# Patient Record
Sex: FEMALE | Race: WHITE | NOT HISPANIC OR LATINO | Employment: OTHER | URBAN - METROPOLITAN AREA
[De-identification: names, ages, dates, MRNs, and addresses within clinical notes are randomized per-mention and may not be internally consistent; named-entity substitution may affect disease eponyms.]

---

## 2017-06-28 ENCOUNTER — HISTORICAL (OUTPATIENT)
Dept: RADIOLOGY | Facility: HOSPITAL | Age: 82
End: 2017-06-28

## 2017-08-24 ENCOUNTER — HISTORICAL (OUTPATIENT)
Dept: ADMINISTRATIVE | Facility: HOSPITAL | Age: 82
End: 2017-08-24

## 2017-10-13 ENCOUNTER — HISTORICAL (OUTPATIENT)
Dept: SURGERY | Facility: HOSPITAL | Age: 82
End: 2017-10-13

## 2017-10-13 LAB
ABS NEUT (OLG): 4.37 X10(3)/MCL (ref 2.1–9.2)
ALBUMIN SERPL-MCNC: 3.8 GM/DL (ref 3.4–5)
ALBUMIN/GLOB SERPL: 1 {RATIO}
ALP SERPL-CCNC: 34 UNIT/L (ref 45–117)
ALT SERPL-CCNC: 24 UNIT/L (ref 13–56)
AST SERPL-CCNC: 26 UNIT/L (ref 15–37)
BASOPHILS # BLD AUTO: 0.05 X10(3)/MCL (ref 0–0.2)
BASOPHILS NFR BLD AUTO: 0.8 % (ref 0–1)
BILIRUB SERPL-MCNC: 0.5 MG/DL (ref 0.2–1)
BILIRUBIN DIRECT+TOT PNL SERPL-MCNC: 0.2 MG/DL (ref 0–0.2)
BILIRUBIN DIRECT+TOT PNL SERPL-MCNC: 0.3 MG/DL (ref 0–1)
BUN SERPL-MCNC: 46 MG/DL (ref 7–18)
CALCIUM SERPL-MCNC: 9.8 MG/DL (ref 8.5–10.1)
CHLORIDE SERPL-SCNC: 109 MMOL/L (ref 98–107)
CHOLEST SERPL-MCNC: 145 MG/DL (ref 0–200)
CHOLEST/HDLC SERPL: 2.7 {RATIO} (ref 0–4)
CO2 SERPL-SCNC: 28 MMOL/L (ref 21–32)
CREAT SERPL-MCNC: 1.53 MG/DL (ref 0.55–1.02)
EOSINOPHIL # BLD AUTO: 0.25 X10(3)/MCL (ref 0–0.9)
EOSINOPHIL NFR BLD AUTO: 4 % (ref 0–6.4)
ERYTHROCYTE [DISTWIDTH] IN BLOOD BY AUTOMATED COUNT: 14.6 % (ref 11.5–17)
EST. AVERAGE GLUCOSE BLD GHB EST-MCNC: 128 MG/DL
GLOBULIN SER-MCNC: 4 GM/DL (ref 2–4)
GLUCOSE SERPL-MCNC: 113 MG/DL (ref 74–106)
HBA1C MFR BLD: 6.1 % (ref 4.2–6.3)
HCT VFR BLD AUTO: 39.8 % (ref 37–47)
HDLC SERPL-MCNC: 53 MG/DL (ref 35–60)
HGB BLD-MCNC: 12.9 GM/DL (ref 12–16)
IMM GRANULOCYTES # BLD AUTO: 0.01 10*3/UL (ref 0–0.02)
IMM GRANULOCYTES NFR BLD AUTO: 0.2 % (ref 0–0.43)
INR PPP: 1
LDLC SERPL CALC-MCNC: 79 MG/DL (ref 0–129)
LYMPHOCYTES # BLD AUTO: 1.19 X10(3)/MCL (ref 0.6–4.6)
LYMPHOCYTES NFR BLD AUTO: 18.9 % (ref 16–44)
MAGNESIUM SERPL-MCNC: 2.5 MG/DL (ref 1.8–2.4)
MCH RBC QN AUTO: 27.7 PG (ref 27–31)
MCHC RBC AUTO-ENTMCNC: 32.4 GM/DL (ref 33–36)
MCV RBC AUTO: 85.4 FL (ref 80–94)
MONOCYTES # BLD AUTO: 0.44 X10(3)/MCL (ref 0.1–1.3)
MONOCYTES NFR BLD AUTO: 7 % (ref 4–12.1)
NEUTROPHILS # BLD AUTO: 4.37 X10(3)/MCL (ref 2.1–9.2)
NEUTROPHILS NFR BLD AUTO: 69.1 % (ref 43–73)
NRBC BLD AUTO-RTO: 0 % (ref 0–0.2)
PLATELET # BLD AUTO: 279 X10(3)/MCL (ref 130–400)
PMV BLD AUTO: 11 FL (ref 7.4–10.4)
POTASSIUM SERPL-SCNC: 4.1 MMOL/L (ref 3.5–5.1)
PROT SERPL-MCNC: 7.6 GM/DL (ref 6.4–8.2)
PROTHROMBIN TIME: 11.3 SECOND(S) (ref 9.8–12)
RBC # BLD AUTO: 4.66 X10(6)/MCL (ref 4.2–5.4)
SODIUM SERPL-SCNC: 143 MMOL/L (ref 136–145)
TRIGL SERPL-MCNC: 66 MG/DL (ref 30–150)
TSH SERPL-ACNC: 2.46 MIU/ML (ref 0.36–3.74)
VLDLC SERPL CALC-MCNC: 13 MG/DL
WBC # SPEC AUTO: 6.3 X10(3)/MCL (ref 4.5–11.5)

## 2019-01-17 ENCOUNTER — HISTORICAL (OUTPATIENT)
Dept: ADMINISTRATIVE | Facility: HOSPITAL | Age: 84
End: 2019-01-17

## 2019-01-24 ENCOUNTER — HISTORICAL (OUTPATIENT)
Dept: RADIOLOGY | Facility: HOSPITAL | Age: 84
End: 2019-01-24

## 2019-10-01 LAB
INFLUENZA A ANTIGEN, POC: NEGATIVE
INFLUENZA B ANTIGEN, POC: NEGATIVE

## 2020-01-16 ENCOUNTER — HISTORICAL (OUTPATIENT)
Dept: ADMINISTRATIVE | Facility: HOSPITAL | Age: 85
End: 2020-01-16

## 2020-01-16 LAB
BUN SERPL-MCNC: 44 MG/DL (ref 7–18)
CALCIUM SERPL-MCNC: 10.5 MG/DL (ref 8.5–10.1)
CHLORIDE SERPL-SCNC: 106 MMOL/L (ref 98–107)
CO2 SERPL-SCNC: 31 MMOL/L (ref 21–32)
CREAT SERPL-MCNC: 1.58 MG/DL (ref 0.55–1.02)
CREAT/UREA NIT SERPL: 27.8
ERYTHROCYTE [DISTWIDTH] IN BLOOD BY AUTOMATED COUNT: 13.8 % (ref 11.5–17)
GLUCOSE SERPL-MCNC: 99 MG/DL (ref 74–106)
HCT VFR BLD AUTO: 43.4 % (ref 37–47)
HGB BLD-MCNC: 13.1 GM/DL (ref 12–16)
MCH RBC QN AUTO: 26.7 PG (ref 27–31)
MCHC RBC AUTO-ENTMCNC: 30.2 GM/DL (ref 33–36)
MCV RBC AUTO: 88.6 FL (ref 80–94)
PLATELET # BLD AUTO: 277 X10(3)/MCL (ref 130–400)
PMV BLD AUTO: 11.2 FL (ref 9.4–12.4)
POTASSIUM SERPL-SCNC: 5.2 MMOL/L (ref 3.5–5.1)
RBC # BLD AUTO: 4.9 X10(6)/MCL (ref 4.2–5.4)
SODIUM SERPL-SCNC: 142 MMOL/L (ref 136–145)
URATE SERPL-MCNC: 4.1 MG/DL (ref 2.6–7.2)
WBC # SPEC AUTO: 6 X10(3)/MCL (ref 4.5–11.5)

## 2020-02-26 ENCOUNTER — HISTORICAL (OUTPATIENT)
Dept: ADMINISTRATIVE | Facility: HOSPITAL | Age: 85
End: 2020-02-26

## 2020-02-26 LAB
ALBUMIN SERPL-MCNC: 3.7 GM/DL (ref 3.4–5)
ALBUMIN/GLOB SERPL: 1 RATIO (ref 1.1–2)
ALP SERPL-CCNC: 51 UNIT/L (ref 38–126)
ALT SERPL-CCNC: 20 UNIT/L (ref 12–78)
AST SERPL-CCNC: 27 UNIT/L (ref 15–37)
BILIRUB SERPL-MCNC: 0.4 MG/DL (ref 0.2–1)
BILIRUBIN DIRECT+TOT PNL SERPL-MCNC: 0.2 MG/DL (ref 0–0.5)
BILIRUBIN DIRECT+TOT PNL SERPL-MCNC: 0.2 MG/DL (ref 0–0.8)
BUN SERPL-MCNC: 43 MG/DL (ref 7–18)
CALCIUM SERPL-MCNC: 10.2 MG/DL (ref 8.5–10.1)
CHLORIDE SERPL-SCNC: 106 MMOL/L (ref 98–107)
CHOLEST SERPL-MCNC: 153 MG/DL (ref 0–200)
CHOLEST/HDLC SERPL: 2.2 {RATIO} (ref 0–4)
CO2 SERPL-SCNC: 29 MMOL/L (ref 21–32)
CREAT SERPL-MCNC: 1.44 MG/DL (ref 0.55–1.02)
DEPRECATED CALCIDIOL+CALCIFEROL SERPL-MC: 75.46 NG/ML (ref 30–80)
ERYTHROCYTE [DISTWIDTH] IN BLOOD BY AUTOMATED COUNT: 13.8 % (ref 11.5–17)
EST. AVERAGE GLUCOSE BLD GHB EST-MCNC: 123 MG/DL
GLOBULIN SER-MCNC: 3.6 GM/DL (ref 2.4–3.5)
GLUCOSE SERPL-MCNC: 99 MG/DL (ref 74–106)
HBA1C MFR BLD: 5.9 % (ref 4.2–6.3)
HCT VFR BLD AUTO: 41.8 % (ref 37–47)
HDLC SERPL-MCNC: 68 MG/DL (ref 35–60)
HGB BLD-MCNC: 12.8 GM/DL (ref 12–16)
LDLC SERPL CALC-MCNC: 75 MG/DL (ref 0–129)
MCH RBC QN AUTO: 27.1 PG (ref 27–31)
MCHC RBC AUTO-ENTMCNC: 30.6 GM/DL (ref 33–36)
MCV RBC AUTO: 88.6 FL (ref 80–94)
PLATELET # BLD AUTO: 297 X10(3)/MCL (ref 130–400)
PMV BLD AUTO: 11.1 FL (ref 9.4–12.4)
POTASSIUM SERPL-SCNC: 5.2 MMOL/L (ref 3.5–5.1)
PROT SERPL-MCNC: 7.3 GM/DL (ref 6.4–8.2)
RBC # BLD AUTO: 4.72 X10(6)/MCL (ref 4.2–5.4)
SODIUM SERPL-SCNC: 140 MMOL/L (ref 136–145)
T3FREE SERPL-MCNC: 2.99 PG/ML (ref 2.18–3.98)
T4 FREE SERPL-MCNC: 1.17 NG/DL (ref 0.76–1.46)
TRIGL SERPL-MCNC: 52 MG/DL (ref 30–150)
TSH SERPL-ACNC: 3.17 MIU/L (ref 0.36–3.74)
VLDLC SERPL CALC-MCNC: 10 MG/DL
WBC # SPEC AUTO: 6.2 X10(3)/MCL (ref 4.5–11.5)

## 2020-06-10 ENCOUNTER — HISTORICAL (OUTPATIENT)
Dept: ADMINISTRATIVE | Facility: HOSPITAL | Age: 85
End: 2020-06-10

## 2020-06-10 LAB
ALBUMIN SERPL-MCNC: 3.9 GM/DL (ref 3.4–4.8)
ALBUMIN/GLOB SERPL: 1.2 RATIO (ref 1.1–2)
ALP SERPL-CCNC: 42 UNIT/L (ref 40–150)
ALT SERPL-CCNC: 14 UNIT/L (ref 0–55)
AST SERPL-CCNC: 28 UNIT/L (ref 5–34)
BILIRUB SERPL-MCNC: 0.5 MG/DL
BILIRUBIN DIRECT+TOT PNL SERPL-MCNC: 0.2 MG/DL (ref 0–0.8)
BILIRUBIN DIRECT+TOT PNL SERPL-MCNC: 0.3 MG/DL (ref 0–0.5)
BUN SERPL-MCNC: 40.2 MG/DL (ref 9.8–20.1)
CALCIUM SERPL-MCNC: 9.9 MG/DL (ref 8.4–10.2)
CHLORIDE SERPL-SCNC: 105 MMOL/L (ref 98–107)
CO2 SERPL-SCNC: 29 MMOL/L (ref 23–31)
CREAT SERPL-MCNC: 1.52 MG/DL (ref 0.55–1.02)
ERYTHROCYTE [DISTWIDTH] IN BLOOD BY AUTOMATED COUNT: 14.2 % (ref 11.5–17)
EST. AVERAGE GLUCOSE BLD GHB EST-MCNC: 119.8 MG/DL
FERRITIN SERPL-MCNC: 67.37 NG/ML (ref 4.63–204)
GLOBULIN SER-MCNC: 3.2 GM/DL (ref 2.4–3.5)
GLUCOSE SERPL-MCNC: 96 MG/DL (ref 82–115)
HBA1C MFR BLD: 5.8 %
HCT VFR BLD AUTO: 42.2 % (ref 37–47)
HGB BLD-MCNC: 13 GM/DL (ref 12–16)
IRON SATN MFR SERPL: 24 % (ref 20–50)
IRON SERPL-MCNC: 101 UG/DL (ref 50–170)
MCH RBC QN AUTO: 27.4 PG (ref 27–31)
MCHC RBC AUTO-ENTMCNC: 30.8 GM/DL (ref 33–36)
MCV RBC AUTO: 89 FL (ref 80–94)
PLATELET # BLD AUTO: 277 X10(3)/MCL (ref 130–400)
PMV BLD AUTO: 10.8 FL (ref 9.4–12.4)
POTASSIUM SERPL-SCNC: 5.1 MMOL/L (ref 3.5–5.1)
PROT SERPL-MCNC: 7.1 GM/DL (ref 5.8–7.6)
PTH-INTACT SERPL-MCNC: 57.9 PG/ML (ref 8.7–77.1)
RBC # BLD AUTO: 4.74 X10(6)/MCL (ref 4.2–5.4)
SODIUM SERPL-SCNC: 140 MMOL/L (ref 136–145)
TIBC SERPL-MCNC: 318 UG/DL (ref 70–310)
TIBC SERPL-MCNC: 419 UG/DL (ref 250–450)
TRANSFERRIN SERPL-MCNC: 379 MG/DL
URATE SERPL-MCNC: 4.8 MG/DL (ref 2.7–6)
WBC # SPEC AUTO: 6 X10(3)/MCL (ref 4.5–11.5)

## 2020-09-03 ENCOUNTER — HISTORICAL (OUTPATIENT)
Dept: ADMINISTRATIVE | Facility: HOSPITAL | Age: 85
End: 2020-09-03

## 2020-09-03 LAB
ALBUMIN SERPL-MCNC: 3.9 GM/DL (ref 3.4–5)
ALBUMIN/GLOB SERPL: 1.3 RATIO (ref 1.1–2)
ALP SERPL-CCNC: 41 UNIT/L (ref 40–150)
ALT SERPL-CCNC: 15 UNIT/L (ref 0–55)
AST SERPL-CCNC: 28 UNIT/L (ref 5–34)
BILIRUB SERPL-MCNC: 0.5 MG/DL
BILIRUBIN DIRECT+TOT PNL SERPL-MCNC: 0.2 MG/DL (ref 0–0.8)
BILIRUBIN DIRECT+TOT PNL SERPL-MCNC: 0.3 MG/DL (ref 0–0.5)
BUN SERPL-MCNC: 43.7 MG/DL (ref 9.8–20.1)
CALCIUM SERPL-MCNC: 9.4 MG/DL (ref 8.4–10.2)
CHLORIDE SERPL-SCNC: 104 MMOL/L (ref 98–107)
CHOLEST SERPL-MCNC: 150 MG/DL
CHOLEST/HDLC SERPL: 3 {RATIO} (ref 0–5)
CO2 SERPL-SCNC: 29 MMOL/L (ref 23–31)
CREAT SERPL-MCNC: 1.47 MG/DL (ref 0.55–1.02)
ERYTHROCYTE [DISTWIDTH] IN BLOOD BY AUTOMATED COUNT: 14.3 % (ref 11.5–17)
EST. AVERAGE GLUCOSE BLD GHB EST-MCNC: 128.4 MG/DL
GLOBULIN SER-MCNC: 3 GM/DL (ref 2.4–3.5)
GLUCOSE SERPL-MCNC: 100 MG/DL (ref 82–115)
HBA1C MFR BLD: 6.1 %
HCT VFR BLD AUTO: 43.4 % (ref 37–47)
HDLC SERPL-MCNC: 55 MG/DL (ref 35–60)
HGB BLD-MCNC: 13.2 GM/DL (ref 12–16)
LDLC SERPL CALC-MCNC: 81 MG/DL (ref 50–140)
MCH RBC QN AUTO: 26.7 PG (ref 27–31)
MCHC RBC AUTO-ENTMCNC: 30.4 GM/DL (ref 33–36)
MCV RBC AUTO: 87.9 FL (ref 80–94)
PLATELET # BLD AUTO: 267 X10(3)/MCL (ref 130–400)
PMV BLD AUTO: 11.5 FL (ref 9.4–12.4)
POTASSIUM SERPL-SCNC: 5.2 MMOL/L (ref 3.5–5.1)
PROT SERPL-MCNC: 6.9 GM/DL (ref 5.8–7.6)
RBC # BLD AUTO: 4.94 X10(6)/MCL (ref 4.2–5.4)
SODIUM SERPL-SCNC: 143 MMOL/L (ref 136–145)
TRIGL SERPL-MCNC: 69 MG/DL (ref 37–140)
VLDLC SERPL CALC-MCNC: 14 MG/DL
WBC # SPEC AUTO: 5.6 X10(3)/MCL (ref 4.5–11.5)

## 2020-12-07 ENCOUNTER — HISTORICAL (OUTPATIENT)
Dept: ADMINISTRATIVE | Facility: HOSPITAL | Age: 85
End: 2020-12-07

## 2020-12-07 LAB
ALBUMIN SERPL-MCNC: 4.1 GM/DL (ref 3.4–4.8)
ALBUMIN/GLOB SERPL: 1.2 RATIO (ref 1.1–2)
ALP SERPL-CCNC: 42 UNIT/L (ref 40–150)
ALT SERPL-CCNC: 16 UNIT/L (ref 0–55)
AST SERPL-CCNC: 30 UNIT/L (ref 5–34)
BILIRUB SERPL-MCNC: 0.6 MG/DL
BILIRUBIN DIRECT+TOT PNL SERPL-MCNC: 0.3 MG/DL (ref 0–0.5)
BILIRUBIN DIRECT+TOT PNL SERPL-MCNC: 0.3 MG/DL (ref 0–0.8)
BUN SERPL-MCNC: 31.4 MG/DL (ref 9.8–20.1)
CALCIUM SERPL-MCNC: 10.1 MG/DL (ref 8.4–10.2)
CHLORIDE SERPL-SCNC: 106 MMOL/L (ref 98–107)
CO2 SERPL-SCNC: 29 MMOL/L (ref 23–31)
CREAT SERPL-MCNC: 1.37 MG/DL (ref 0.55–1.02)
CREAT UR-MCNC: 118 MG/DL (ref 45–106)
ERYTHROCYTE [DISTWIDTH] IN BLOOD BY AUTOMATED COUNT: 14.6 % (ref 11.5–17)
EST. AVERAGE GLUCOSE BLD GHB EST-MCNC: 119.8 MG/DL
GLOBULIN SER-MCNC: 3.3 GM/DL (ref 2.4–3.5)
GLUCOSE SERPL-MCNC: 100 MG/DL (ref 82–115)
HBA1C MFR BLD: 5.8 %
HCT VFR BLD AUTO: 40.8 % (ref 37–47)
HGB BLD-MCNC: 12.7 GM/DL (ref 12–16)
MCH RBC QN AUTO: 27.7 PG (ref 27–31)
MCHC RBC AUTO-ENTMCNC: 31.1 GM/DL (ref 33–36)
MCV RBC AUTO: 89.1 FL (ref 80–94)
MICROALBUMIN UR-MCNC: 26.8 UG/ML
MICROALBUMIN/CREAT RATIO PNL UR: 22.7 MG/GM CR (ref 0–30)
PLATELET # BLD AUTO: 284 X10(3)/MCL (ref 130–400)
PMV BLD AUTO: 11.4 FL (ref 9.4–12.4)
POTASSIUM SERPL-SCNC: 5.4 MMOL/L (ref 3.5–5.1)
PROT SERPL-MCNC: 7.4 GM/DL (ref 5.8–7.6)
RBC # BLD AUTO: 4.58 X10(6)/MCL (ref 4.2–5.4)
SODIUM SERPL-SCNC: 143 MMOL/L (ref 136–145)
URATE SERPL-MCNC: 4.8 MG/DL (ref 2.6–6)
WBC # SPEC AUTO: 6.3 X10(3)/MCL (ref 4.5–11.5)

## 2021-05-13 ENCOUNTER — HISTORICAL (OUTPATIENT)
Dept: ADMINISTRATIVE | Facility: HOSPITAL | Age: 86
End: 2021-05-13

## 2021-05-13 LAB
ABS NEUT (OLG): 6.64 X10(3)/MCL (ref 2.1–9.2)
ALBUMIN SERPL-MCNC: 4 GM/DL (ref 3.4–4.8)
ALBUMIN/GLOB SERPL: 1.2 RATIO (ref 1.1–2)
ALP SERPL-CCNC: 48 UNIT/L (ref 40–150)
ALT SERPL-CCNC: 17 UNIT/L (ref 0–55)
AST SERPL-CCNC: 34 UNIT/L (ref 5–34)
BASOPHILS # BLD AUTO: 0.1 X10(3)/MCL (ref 0–0.2)
BASOPHILS NFR BLD AUTO: 1 %
BILIRUB SERPL-MCNC: 0.5 MG/DL
BILIRUBIN DIRECT+TOT PNL SERPL-MCNC: 0.2 MG/DL (ref 0–0.8)
BILIRUBIN DIRECT+TOT PNL SERPL-MCNC: 0.3 MG/DL (ref 0–0.5)
BUN SERPL-MCNC: 40 MG/DL (ref 9.8–20.1)
CALCIUM SERPL-MCNC: 10.4 MG/DL (ref 8.4–10.2)
CHLORIDE SERPL-SCNC: 106 MMOL/L (ref 98–107)
CHOLEST SERPL-MCNC: 152 MG/DL
CHOLEST/HDLC SERPL: 3 {RATIO} (ref 0–5)
CO2 SERPL-SCNC: 26 MMOL/L (ref 23–31)
CREAT SERPL-MCNC: 1.74 MG/DL (ref 0.55–1.02)
DEPRECATED CALCIDIOL+CALCIFEROL SERPL-MC: 67.3 NG/ML (ref 30–80)
EOSINOPHIL # BLD AUTO: 0.3 X10(3)/MCL (ref 0–0.9)
EOSINOPHIL NFR BLD AUTO: 3 %
ERYTHROCYTE [DISTWIDTH] IN BLOOD BY AUTOMATED COUNT: 14.6 % (ref 11.5–17)
EST. AVERAGE GLUCOSE BLD GHB EST-MCNC: 102.5 MG/DL
GLOBULIN SER-MCNC: 3.2 GM/DL (ref 2.4–3.5)
GLUCOSE SERPL-MCNC: 89 MG/DL (ref 82–115)
HBA1C MFR BLD: 5.2 %
HCT VFR BLD AUTO: 40.8 % (ref 37–47)
HDLC SERPL-MCNC: 56 MG/DL (ref 35–60)
HGB BLD-MCNC: 12.7 GM/DL (ref 12–16)
INR PPP: 1 (ref 0–1.3)
LDLC SERPL CALC-MCNC: 79 MG/DL (ref 50–140)
LYMPHOCYTES # BLD AUTO: 1 X10(3)/MCL (ref 0.6–4.6)
LYMPHOCYTES NFR BLD AUTO: 11 %
MAGNESIUM SERPL-MCNC: 1.9 MG/DL (ref 1.6–2.6)
MCH RBC QN AUTO: 27.8 PG (ref 27–31)
MCHC RBC AUTO-ENTMCNC: 31.1 GM/DL (ref 33–36)
MCV RBC AUTO: 89.3 FL (ref 80–94)
MONOCYTES # BLD AUTO: 0.5 X10(3)/MCL (ref 0.1–1.3)
MONOCYTES NFR BLD AUTO: 6 %
NEUTROPHILS # BLD AUTO: 6.64 X10(3)/MCL (ref 2.1–9.2)
NEUTROPHILS NFR BLD AUTO: 78 %
PLATELET # BLD AUTO: 281 X10(3)/MCL (ref 130–400)
PMV BLD AUTO: 11.9 FL (ref 9.4–12.4)
POTASSIUM SERPL-SCNC: 4.6 MMOL/L (ref 3.5–5.1)
PROT SERPL-MCNC: 7.2 GM/DL (ref 5.8–7.6)
PROTHROMBIN TIME: 13.1 SECOND(S) (ref 11.1–13.7)
RBC # BLD AUTO: 4.57 X10(6)/MCL (ref 4.2–5.4)
SODIUM SERPL-SCNC: 144 MMOL/L (ref 136–145)
T4 FREE SERPL-MCNC: 1.09 NG/DL (ref 0.7–1.48)
TRIGL SERPL-MCNC: 84 MG/DL (ref 37–140)
TSH SERPL-ACNC: 3.02 UIU/ML (ref 0.35–4.94)
VLDLC SERPL CALC-MCNC: 17 MG/DL
WBC # SPEC AUTO: 8.5 X10(3)/MCL (ref 4.5–11.5)

## 2021-08-25 ENCOUNTER — HISTORICAL (OUTPATIENT)
Dept: ADMINISTRATIVE | Facility: HOSPITAL | Age: 86
End: 2021-08-25

## 2021-08-25 LAB
BUN SERPL-MCNC: 35.2 MG/DL (ref 9.8–20.1)
CALCIUM SERPL-MCNC: 10.3 MG/DL (ref 8.4–10.2)
CHLORIDE SERPL-SCNC: 104 MMOL/L (ref 98–107)
CO2 SERPL-SCNC: 29 MMOL/L (ref 23–31)
CREAT SERPL-MCNC: 1.42 MG/DL (ref 0.55–1.02)
CREAT/UREA NIT SERPL: 25
GLUCOSE SERPL-MCNC: 100 MG/DL (ref 82–115)
POTASSIUM SERPL-SCNC: 5.2 MMOL/L (ref 3.5–5.1)
SODIUM SERPL-SCNC: 143 MMOL/L (ref 136–145)

## 2021-11-11 ENCOUNTER — HISTORICAL (OUTPATIENT)
Dept: ADMINISTRATIVE | Facility: HOSPITAL | Age: 86
End: 2021-11-11

## 2021-11-11 LAB
ABS NEUT (OLG): 4.66 X10(3)/MCL (ref 2.1–9.2)
ALBUMIN SERPL-MCNC: 3.9 GM/DL (ref 3.4–4.8)
ALBUMIN/GLOB SERPL: 1.1 RATIO (ref 1.1–2)
ALP SERPL-CCNC: 48 UNIT/L (ref 40–150)
ALT SERPL-CCNC: 16 UNIT/L (ref 0–55)
AST SERPL-CCNC: 35 UNIT/L (ref 5–34)
BASOPHILS # BLD AUTO: 0.1 X10(3)/MCL (ref 0–0.2)
BASOPHILS NFR BLD AUTO: 1 %
BILIRUB SERPL-MCNC: 0.6 MG/DL
BILIRUBIN DIRECT+TOT PNL SERPL-MCNC: 0.3 MG/DL (ref 0–0.5)
BILIRUBIN DIRECT+TOT PNL SERPL-MCNC: 0.3 MG/DL (ref 0–0.8)
BUN SERPL-MCNC: 36.7 MG/DL (ref 9.8–20.1)
CALCIUM SERPL-MCNC: 10.6 MG/DL (ref 8.7–10.5)
CHLORIDE SERPL-SCNC: 104 MMOL/L (ref 98–107)
CHOLEST SERPL-MCNC: 142 MG/DL
CHOLEST/HDLC SERPL: 3 {RATIO} (ref 0–5)
CO2 SERPL-SCNC: 28 MMOL/L (ref 23–31)
CREAT SERPL-MCNC: 1.51 MG/DL (ref 0.55–1.02)
EOSINOPHIL # BLD AUTO: 0.3 X10(3)/MCL (ref 0–0.9)
EOSINOPHIL NFR BLD AUTO: 4 %
ERYTHROCYTE [DISTWIDTH] IN BLOOD BY AUTOMATED COUNT: 14.6 % (ref 11.5–17)
GLOBULIN SER-MCNC: 3.7 GM/DL (ref 2.4–3.5)
GLUCOSE SERPL-MCNC: 98 MG/DL (ref 82–115)
HCT VFR BLD AUTO: 40.1 % (ref 37–47)
HDLC SERPL-MCNC: 54 MG/DL (ref 35–60)
HGB BLD-MCNC: 12.6 GM/DL (ref 12–16)
LDLC SERPL CALC-MCNC: 61 MG/DL (ref 50–140)
LYMPHOCYTES # BLD AUTO: 1.3 X10(3)/MCL (ref 0.6–4.6)
LYMPHOCYTES NFR BLD AUTO: 19 %
MAGNESIUM SERPL-MCNC: 1.8 MG/DL (ref 1.6–2.6)
MCH RBC QN AUTO: 27.2 PG (ref 27–31)
MCHC RBC AUTO-ENTMCNC: 31.4 GM/DL (ref 33–36)
MCV RBC AUTO: 86.4 FL (ref 80–94)
MONOCYTES # BLD AUTO: 0.6 X10(3)/MCL (ref 0.1–1.3)
MONOCYTES NFR BLD AUTO: 8 %
NEUTROPHILS # BLD AUTO: 4.66 X10(3)/MCL (ref 2.1–9.2)
NEUTROPHILS NFR BLD AUTO: 67 %
PLATELET # BLD AUTO: 309 X10(3)/MCL (ref 130–400)
PMV BLD AUTO: 11.4 FL (ref 9.4–12.4)
POTASSIUM SERPL-SCNC: 4.5 MMOL/L (ref 3.5–5.1)
PROT SERPL-MCNC: 7.6 GM/DL (ref 5.8–7.6)
RBC # BLD AUTO: 4.64 X10(6)/MCL (ref 4.2–5.4)
SODIUM SERPL-SCNC: 141 MMOL/L (ref 136–145)
TRIGL SERPL-MCNC: 137 MG/DL (ref 37–140)
VLDLC SERPL CALC-MCNC: 27 MG/DL
WBC # SPEC AUTO: 7 X10(3)/MCL (ref 4.5–11.5)

## 2021-12-09 ENCOUNTER — HISTORICAL (OUTPATIENT)
Dept: ADMINISTRATIVE | Facility: HOSPITAL | Age: 86
End: 2021-12-09

## 2021-12-09 LAB
ALBUMIN SERPL-MCNC: 3.8 GM/DL (ref 3.4–4.8)
ALBUMIN/GLOB SERPL: 1.2 RATIO (ref 1.1–2)
ALP SERPL-CCNC: 51 UNIT/L (ref 40–150)
ALT SERPL-CCNC: 15 UNIT/L (ref 0–55)
AST SERPL-CCNC: 31 UNIT/L (ref 5–34)
BILIRUB SERPL-MCNC: 0.6 MG/DL
BILIRUBIN DIRECT+TOT PNL SERPL-MCNC: 0.3 MG/DL (ref 0–0.5)
BILIRUBIN DIRECT+TOT PNL SERPL-MCNC: 0.3 MG/DL (ref 0–0.8)
BUN SERPL-MCNC: 33.2 MG/DL (ref 9.8–20.1)
CALCIUM SERPL-MCNC: 10.6 MG/DL (ref 8.7–10.5)
CHLORIDE SERPL-SCNC: 100 MMOL/L (ref 98–107)
CO2 SERPL-SCNC: 28 MMOL/L (ref 23–31)
CREAT SERPL-MCNC: 1.42 MG/DL (ref 0.55–1.02)
ERYTHROCYTE [DISTWIDTH] IN BLOOD BY AUTOMATED COUNT: 14.9 % (ref 11.5–17)
GLOBULIN SER-MCNC: 3.3 GM/DL (ref 2.4–3.5)
GLUCOSE SERPL-MCNC: 97 MG/DL (ref 82–115)
HCT VFR BLD AUTO: 38.2 % (ref 37–47)
HGB BLD-MCNC: 12.2 GM/DL (ref 12–16)
INR PPP: 1 (ref 0–1.3)
MAGNESIUM SERPL-MCNC: 1.8 MG/DL (ref 1.6–2.6)
MCH RBC QN AUTO: 27.9 PG (ref 27–31)
MCHC RBC AUTO-ENTMCNC: 31.9 GM/DL (ref 33–36)
MCV RBC AUTO: 87.2 FL (ref 80–94)
PLATELET # BLD AUTO: 302 X10(3)/MCL (ref 130–400)
PMV BLD AUTO: 11.2 FL (ref 9.4–12.4)
POTASSIUM SERPL-SCNC: 4.7 MMOL/L (ref 3.5–5.1)
PROT SERPL-MCNC: 7.1 GM/DL (ref 5.8–7.6)
PROTHROMBIN TIME: 12.9 SECOND(S) (ref 12.5–14.5)
RBC # BLD AUTO: 4.38 X10(6)/MCL (ref 4.2–5.4)
SODIUM SERPL-SCNC: 139 MMOL/L (ref 136–145)
TSH SERPL-ACNC: 2.07 UIU/ML (ref 0.35–4.94)
WBC # SPEC AUTO: 7.4 X10(3)/MCL (ref 4.5–11.5)

## 2022-01-01 ENCOUNTER — DOCUMENT SCAN (OUTPATIENT)
Dept: HOME HEALTH SERVICES | Facility: HOSPITAL | Age: 87
End: 2022-01-01
Payer: MEDICARE

## 2022-01-01 ENCOUNTER — TELEPHONE (OUTPATIENT)
Dept: INTERNAL MEDICINE | Facility: CLINIC | Age: 87
End: 2022-01-01
Payer: MEDICARE

## 2022-01-01 ENCOUNTER — OFFICE VISIT (OUTPATIENT)
Dept: NEPHROLOGY | Facility: CLINIC | Age: 87
End: 2022-01-01
Payer: MEDICARE

## 2022-01-01 ENCOUNTER — HOSPITAL ENCOUNTER (OUTPATIENT)
Dept: RADIOLOGY | Facility: HOSPITAL | Age: 87
Discharge: HOME | End: 2022-09-19
Attending: INTERNAL MEDICINE
Payer: MEDICARE

## 2022-01-01 ENCOUNTER — OFFICE VISIT (OUTPATIENT)
Dept: INTERNAL MEDICINE | Facility: CLINIC | Age: 87
End: 2022-01-01
Payer: MEDICARE

## 2022-01-01 ENCOUNTER — HOSPITAL ENCOUNTER (INPATIENT)
Facility: HOSPITAL | Age: 87
LOS: 2 days | Discharge: HOME HEALTH | DRG: 683 | End: 2022-06-21
Attending: EMERGENCY MEDICINE | Admitting: INTERNAL MEDICINE
Payer: MEDICARE

## 2022-01-01 ENCOUNTER — TELEPHONE (OUTPATIENT)
Dept: NEPHROLOGY | Facility: CLINIC | Age: 87
End: 2022-01-01
Payer: MEDICARE

## 2022-01-01 ENCOUNTER — LAB VISIT (OUTPATIENT)
Dept: LAB | Facility: HOSPITAL | Age: 87
End: 2022-01-01
Attending: INTERNAL MEDICINE
Payer: MEDICARE

## 2022-01-01 ENCOUNTER — PATIENT OUTREACH (OUTPATIENT)
Dept: ADMINISTRATIVE | Facility: CLINIC | Age: 87
End: 2022-01-01
Payer: MEDICARE

## 2022-01-01 ENCOUNTER — HOSPITAL ENCOUNTER (OUTPATIENT)
Facility: HOSPITAL | Age: 87
Discharge: HOME | End: 2022-11-17
Attending: INTERNAL MEDICINE | Admitting: INTERNAL MEDICINE
Payer: MEDICARE

## 2022-01-01 ENCOUNTER — PATIENT OUTREACH (OUTPATIENT)
Dept: HOME HEALTH SERVICES | Facility: HOSPITAL | Age: 87
End: 2022-01-01
Payer: MEDICARE

## 2022-01-01 ENCOUNTER — HOSPITAL ENCOUNTER (INPATIENT)
Facility: HOSPITAL | Age: 87
LOS: 7 days | Discharge: HOME HEALTH | DRG: 291 | End: 2022-12-13
Attending: EMERGENCY MEDICINE | Admitting: INTERNAL MEDICINE
Payer: MEDICARE

## 2022-01-01 ENCOUNTER — OFFICE VISIT (OUTPATIENT)
Dept: NEPHROLOGY | Facility: CLINIC | Age: 87
DRG: 291 | End: 2022-01-01
Payer: MEDICARE

## 2022-01-01 ENCOUNTER — HOSPITAL ENCOUNTER (OUTPATIENT)
Dept: RADIOLOGY | Facility: HOSPITAL | Age: 87
Discharge: HOME | End: 2022-11-07
Attending: INTERNAL MEDICINE
Payer: MEDICARE

## 2022-01-01 ENCOUNTER — ANESTHESIA EVENT (OUTPATIENT)
Dept: ENDOSCOPY | Facility: HOSPITAL | Age: 87
End: 2022-01-01
Payer: MEDICARE

## 2022-01-01 ENCOUNTER — ANESTHESIA (OUTPATIENT)
Dept: ENDOSCOPY | Facility: HOSPITAL | Age: 87
End: 2022-01-01
Payer: MEDICARE

## 2022-01-01 ENCOUNTER — EXTERNAL HOME HEALTH (OUTPATIENT)
Dept: HOME HEALTH SERVICES | Facility: HOSPITAL | Age: 87
End: 2022-01-01
Payer: MEDICARE

## 2022-01-01 ENCOUNTER — HISTORICAL (OUTPATIENT)
Dept: ADMINISTRATIVE | Facility: HOSPITAL | Age: 87
End: 2022-01-01

## 2022-01-01 ENCOUNTER — LAB REQUISITION (OUTPATIENT)
Dept: LAB | Facility: HOSPITAL | Age: 87
End: 2022-01-01
Payer: MEDICARE

## 2022-01-01 ENCOUNTER — PATIENT OUTREACH (OUTPATIENT)
Dept: ADMINISTRATIVE | Facility: HOSPITAL | Age: 87
End: 2022-01-01
Payer: MEDICARE

## 2022-01-01 ENCOUNTER — HISTORICAL (OUTPATIENT)
Dept: ADMINISTRATIVE | Facility: HOSPITAL | Age: 87
End: 2022-01-01
Payer: MEDICARE

## 2022-01-01 ENCOUNTER — HOSPITAL ENCOUNTER (EMERGENCY)
Facility: HOSPITAL | Age: 87
Discharge: HOME | End: 2022-09-28
Attending: STUDENT IN AN ORGANIZED HEALTH CARE EDUCATION/TRAINING PROGRAM
Payer: MEDICARE

## 2022-01-01 ENCOUNTER — DOCUMENTATION ONLY (OUTPATIENT)
Dept: INTERNAL MEDICINE | Facility: CLINIC | Age: 87
End: 2022-01-01
Payer: MEDICARE

## 2022-01-01 VITALS
DIASTOLIC BLOOD PRESSURE: 50 MMHG | WEIGHT: 100.38 LBS | HEIGHT: 61 IN | TEMPERATURE: 98 F | RESPIRATION RATE: 20 BRPM | HEART RATE: 78 BPM | BODY MASS INDEX: 18.95 KG/M2 | SYSTOLIC BLOOD PRESSURE: 88 MMHG

## 2022-01-01 VITALS
HEART RATE: 100 BPM | TEMPERATURE: 98 F | HEART RATE: 94 BPM | DIASTOLIC BLOOD PRESSURE: 72 MMHG | SYSTOLIC BLOOD PRESSURE: 102 MMHG | BODY MASS INDEX: 20.42 KG/M2 | SYSTOLIC BLOOD PRESSURE: 112 MMHG | HEIGHT: 61 IN | OXYGEN SATURATION: 95 % | WEIGHT: 108.19 LBS | RESPIRATION RATE: 20 BRPM | OXYGEN SATURATION: 95 % | TEMPERATURE: 98 F | DIASTOLIC BLOOD PRESSURE: 74 MMHG

## 2022-01-01 VITALS
DIASTOLIC BLOOD PRESSURE: 58 MMHG | HEART RATE: 55 BPM | OXYGEN SATURATION: 99 % | TEMPERATURE: 98 F | RESPIRATION RATE: 20 BRPM | SYSTOLIC BLOOD PRESSURE: 110 MMHG | WEIGHT: 107.81 LBS | BODY MASS INDEX: 19.84 KG/M2 | HEIGHT: 62 IN

## 2022-01-01 VITALS
BODY MASS INDEX: 23.16 KG/M2 | TEMPERATURE: 98 F | HEIGHT: 60 IN | HEIGHT: 60 IN | BODY MASS INDEX: 23.29 KG/M2 | OXYGEN SATURATION: 100 % | TEMPERATURE: 98 F | DIASTOLIC BLOOD PRESSURE: 78 MMHG | WEIGHT: 118 LBS | DIASTOLIC BLOOD PRESSURE: 62 MMHG | SYSTOLIC BLOOD PRESSURE: 110 MMHG | RESPIRATION RATE: 20 BRPM | SYSTOLIC BLOOD PRESSURE: 112 MMHG | HEART RATE: 75 BPM | WEIGHT: 118.63 LBS

## 2022-01-01 VITALS
RESPIRATION RATE: 20 BRPM | SYSTOLIC BLOOD PRESSURE: 112 MMHG | BODY MASS INDEX: 19.15 KG/M2 | DIASTOLIC BLOOD PRESSURE: 74 MMHG | TEMPERATURE: 98 F | HEIGHT: 61 IN | WEIGHT: 101.44 LBS | HEART RATE: 63 BPM | OXYGEN SATURATION: 96 %

## 2022-01-01 VITALS
BODY MASS INDEX: 18.95 KG/M2 | DIASTOLIC BLOOD PRESSURE: 64 MMHG | HEIGHT: 61 IN | WEIGHT: 100.38 LBS | TEMPERATURE: 98 F | SYSTOLIC BLOOD PRESSURE: 104 MMHG

## 2022-01-01 VITALS
HEIGHT: 60 IN | TEMPERATURE: 98 F | BODY MASS INDEX: 20.35 KG/M2 | HEART RATE: 97 BPM | WEIGHT: 103.63 LBS | SYSTOLIC BLOOD PRESSURE: 112 MMHG | DIASTOLIC BLOOD PRESSURE: 70 MMHG | OXYGEN SATURATION: 97 %

## 2022-01-01 VITALS
HEIGHT: 61 IN | RESPIRATION RATE: 20 BRPM | TEMPERATURE: 98 F | BODY MASS INDEX: 18.61 KG/M2 | SYSTOLIC BLOOD PRESSURE: 100 MMHG | DIASTOLIC BLOOD PRESSURE: 70 MMHG | HEART RATE: 61 BPM | OXYGEN SATURATION: 97 % | WEIGHT: 98.56 LBS

## 2022-01-01 VITALS
OXYGEN SATURATION: 95 % | WEIGHT: 106.38 LBS | SYSTOLIC BLOOD PRESSURE: 102 MMHG | HEART RATE: 85 BPM | TEMPERATURE: 98 F | BODY MASS INDEX: 19.57 KG/M2 | DIASTOLIC BLOOD PRESSURE: 68 MMHG | HEIGHT: 62 IN

## 2022-01-01 VITALS
RESPIRATION RATE: 18 BRPM | SYSTOLIC BLOOD PRESSURE: 111 MMHG | TEMPERATURE: 98 F | DIASTOLIC BLOOD PRESSURE: 65 MMHG | WEIGHT: 98 LBS | HEART RATE: 80 BPM | HEIGHT: 62 IN | BODY MASS INDEX: 18.03 KG/M2 | OXYGEN SATURATION: 96 %

## 2022-01-01 VITALS
OXYGEN SATURATION: 94 % | HEIGHT: 62 IN | BODY MASS INDEX: 22.15 KG/M2 | RESPIRATION RATE: 20 BRPM | SYSTOLIC BLOOD PRESSURE: 110 MMHG | TEMPERATURE: 98 F | WEIGHT: 92.81 LBS | RESPIRATION RATE: 20 BRPM | DIASTOLIC BLOOD PRESSURE: 76 MMHG | OXYGEN SATURATION: 95 % | DIASTOLIC BLOOD PRESSURE: 76 MMHG | WEIGHT: 120.38 LBS | HEART RATE: 82 BPM | TEMPERATURE: 98 F | HEART RATE: 85 BPM | SYSTOLIC BLOOD PRESSURE: 100 MMHG | HEIGHT: 60 IN | BODY MASS INDEX: 18.22 KG/M2

## 2022-01-01 VITALS
BODY MASS INDEX: 19.27 KG/M2 | OXYGEN SATURATION: 95 % | DIASTOLIC BLOOD PRESSURE: 70 MMHG | SYSTOLIC BLOOD PRESSURE: 104 MMHG | HEART RATE: 88 BPM | WEIGHT: 104.75 LBS | HEIGHT: 62 IN | TEMPERATURE: 98 F | RESPIRATION RATE: 20 BRPM

## 2022-01-01 VITALS
SYSTOLIC BLOOD PRESSURE: 118 MMHG | BODY MASS INDEX: 19.32 KG/M2 | TEMPERATURE: 97 F | OXYGEN SATURATION: 100 % | DIASTOLIC BLOOD PRESSURE: 70 MMHG | WEIGHT: 105 LBS | HEIGHT: 62 IN | RESPIRATION RATE: 20 BRPM | HEART RATE: 70 BPM

## 2022-01-01 VITALS
SYSTOLIC BLOOD PRESSURE: 108 MMHG | BODY MASS INDEX: 20.82 KG/M2 | HEIGHT: 61 IN | OXYGEN SATURATION: 98 % | WEIGHT: 110.25 LBS | DIASTOLIC BLOOD PRESSURE: 72 MMHG

## 2022-01-01 DIAGNOSIS — K21.9 GASTROESOPHAGEAL REFLUX DISEASE WITHOUT ESOPHAGITIS: ICD-10-CM

## 2022-01-01 DIAGNOSIS — I10 HYPERTENSION, UNSPECIFIED TYPE: ICD-10-CM

## 2022-01-01 DIAGNOSIS — R10.13 EPIGASTRIC PAIN: Primary | ICD-10-CM

## 2022-01-01 DIAGNOSIS — Z09 HOSPITAL DISCHARGE FOLLOW-UP: ICD-10-CM

## 2022-01-01 DIAGNOSIS — Z95.2 S/P MVR (MITRAL VALVE REPLACEMENT): Primary | Chronic | ICD-10-CM

## 2022-01-01 DIAGNOSIS — I13.10 CARDIORENAL DISEASE: ICD-10-CM

## 2022-01-01 DIAGNOSIS — N20.0 KIDNEY STONE: ICD-10-CM

## 2022-01-01 DIAGNOSIS — I50.43 ACUTE ON CHRONIC COMBINED SYSTOLIC AND DIASTOLIC HEART FAILURE: ICD-10-CM

## 2022-01-01 DIAGNOSIS — R53.83 FATIGUE, UNSPECIFIED TYPE: ICD-10-CM

## 2022-01-01 DIAGNOSIS — E78.5 HYPERLIPIDEMIA, UNSPECIFIED HYPERLIPIDEMIA TYPE: ICD-10-CM

## 2022-01-01 DIAGNOSIS — I13.0 HYPERTENSIVE HEART AND CHRONIC KIDNEY DISEASE WITH HEART FAILURE AND STAGE 1 THROUGH STAGE 4 CHRONIC KIDNEY DISEASE, OR UNSPECIFIED CHRONIC KIDNEY DISEASE: ICD-10-CM

## 2022-01-01 DIAGNOSIS — E55.9 VITAMIN D DEFICIENCY: ICD-10-CM

## 2022-01-01 DIAGNOSIS — I48.0 PAROXYSMAL ATRIAL FIBRILLATION: ICD-10-CM

## 2022-01-01 DIAGNOSIS — Z95.2 S/P MVR (MITRAL VALVE REPLACEMENT): ICD-10-CM

## 2022-01-01 DIAGNOSIS — R09.02 HYPOXIA: ICD-10-CM

## 2022-01-01 DIAGNOSIS — I48.0 PAROXYSMAL ATRIAL FIBRILLATION: Primary | ICD-10-CM

## 2022-01-01 DIAGNOSIS — N18.31 STAGE 3A CHRONIC KIDNEY DISEASE: ICD-10-CM

## 2022-01-01 DIAGNOSIS — I13.10 CARDIORENAL DISEASE: Primary | ICD-10-CM

## 2022-01-01 DIAGNOSIS — E87.3 ALKALOSIS: ICD-10-CM

## 2022-01-01 DIAGNOSIS — J90 PLEURAL EFFUSION: ICD-10-CM

## 2022-01-01 DIAGNOSIS — I73.9 PERIPHERAL VASCULAR DISEASE, UNSPECIFIED: ICD-10-CM

## 2022-01-01 DIAGNOSIS — R06.09 DYSPNEA ON EXERTION: Primary | ICD-10-CM

## 2022-01-01 DIAGNOSIS — R53.1 WEAKNESS: Primary | ICD-10-CM

## 2022-01-01 DIAGNOSIS — N17.9 AKI (ACUTE KIDNEY INJURY): ICD-10-CM

## 2022-01-01 DIAGNOSIS — I25.10 ARTERIOSCLEROSIS OF CORONARY ARTERY: ICD-10-CM

## 2022-01-01 DIAGNOSIS — R60.0 BILATERAL LOWER EXTREMITY EDEMA: ICD-10-CM

## 2022-01-01 DIAGNOSIS — R01.1 HEART MURMUR: ICD-10-CM

## 2022-01-01 DIAGNOSIS — R06.09 DYSPNEA ON EXERTION: ICD-10-CM

## 2022-01-01 DIAGNOSIS — E87.3 METABOLIC ALKALOSIS: ICD-10-CM

## 2022-01-01 DIAGNOSIS — R11.2 NAUSEA AND VOMITING, INTRACTABILITY OF VOMITING NOT SPECIFIED, UNSPECIFIED VOMITING TYPE: ICD-10-CM

## 2022-01-01 DIAGNOSIS — Z00.00 MEDICARE ANNUAL WELLNESS VISIT, SUBSEQUENT: ICD-10-CM

## 2022-01-01 DIAGNOSIS — E03.9 HYPOTHYROIDISM, UNSPECIFIED TYPE: ICD-10-CM

## 2022-01-01 DIAGNOSIS — I10 ESSENTIAL (PRIMARY) HYPERTENSION: ICD-10-CM

## 2022-01-01 DIAGNOSIS — N18.31 STAGE 3A CHRONIC KIDNEY DISEASE: Primary | ICD-10-CM

## 2022-01-01 DIAGNOSIS — R11.2 NON-INTRACTABLE VOMITING WITH NAUSEA, UNSPECIFIED VOMITING TYPE: ICD-10-CM

## 2022-01-01 DIAGNOSIS — Z95.2 S/P MVR (MITRAL VALVE REPLACEMENT): Primary | ICD-10-CM

## 2022-01-01 DIAGNOSIS — K21.9 GASTROESOPHAGEAL REFLUX DISEASE, UNSPECIFIED WHETHER ESOPHAGITIS PRESENT: Primary | ICD-10-CM

## 2022-01-01 DIAGNOSIS — R11.0 NAUSEA: Primary | ICD-10-CM

## 2022-01-01 DIAGNOSIS — G45.9 TRANSIENT ISCHEMIC ATTACK: ICD-10-CM

## 2022-01-01 DIAGNOSIS — Z01.818 PRE-OPERATIVE CLEARANCE: ICD-10-CM

## 2022-01-01 DIAGNOSIS — R19.7 DIARRHEA, UNSPECIFIED TYPE: ICD-10-CM

## 2022-01-01 DIAGNOSIS — I25.10 ARTERIOSCLEROSIS OF CORONARY ARTERY: Chronic | ICD-10-CM

## 2022-01-01 DIAGNOSIS — N17.9 ACUTE KIDNEY FAILURE, UNSPECIFIED: ICD-10-CM

## 2022-01-01 DIAGNOSIS — Z01.818 PRE-OPERATIVE CLEARANCE: Primary | ICD-10-CM

## 2022-01-01 DIAGNOSIS — N28.9 WORSENING RENAL FUNCTION: ICD-10-CM

## 2022-01-01 DIAGNOSIS — N18.9 ACUTE KIDNEY INJURY SUPERIMPOSED ON CKD: ICD-10-CM

## 2022-01-01 DIAGNOSIS — K21.9 GASTROESOPHAGEAL REFLUX DISEASE, UNSPECIFIED WHETHER ESOPHAGITIS PRESENT: ICD-10-CM

## 2022-01-01 DIAGNOSIS — I25.118 ATHEROSCLEROTIC HEART DISEASE OF NATIVE CORONARY ARTERY WITH OTHER FORMS OF ANGINA PECTORIS: Primary | ICD-10-CM

## 2022-01-01 DIAGNOSIS — N18.31 CHRONIC KIDNEY DISEASE, STAGE 3A: ICD-10-CM

## 2022-01-01 DIAGNOSIS — R60.0 PEDAL EDEMA: ICD-10-CM

## 2022-01-01 DIAGNOSIS — R89.9 ABNORMAL LABORATORY TEST: ICD-10-CM

## 2022-01-01 DIAGNOSIS — I13.10 HYPERTENSIVE HEART AND CHRONIC KIDNEY DISEASE WITHOUT HEART FAILURE, WITH STAGE 1 THROUGH STAGE 4 CHRONIC KIDNEY DISEASE, OR UNSPECIFIED CHRONIC KIDNEY DISEASE: ICD-10-CM

## 2022-01-01 DIAGNOSIS — I50.9 HEART FAILURE, UNSPECIFIED: ICD-10-CM

## 2022-01-01 DIAGNOSIS — N17.9 ACUTE KIDNEY INJURY: ICD-10-CM

## 2022-01-01 DIAGNOSIS — E03.9 HYPOTHYROIDISM, UNSPECIFIED TYPE: Chronic | ICD-10-CM

## 2022-01-01 DIAGNOSIS — I10 PRIMARY HYPERTENSION: ICD-10-CM

## 2022-01-01 DIAGNOSIS — I10 PRIMARY HYPERTENSION: Chronic | ICD-10-CM

## 2022-01-01 DIAGNOSIS — I38 VALVULAR HEART DISEASE: Primary | ICD-10-CM

## 2022-01-01 DIAGNOSIS — K52.9 GASTROENTERITIS: Primary | ICD-10-CM

## 2022-01-01 DIAGNOSIS — N17.9 ACUTE KIDNEY INJURY SUPERIMPOSED ON CKD: ICD-10-CM

## 2022-01-01 DIAGNOSIS — R07.9 CHEST PAIN: ICD-10-CM

## 2022-01-01 DIAGNOSIS — E86.0 DEHYDRATION: ICD-10-CM

## 2022-01-01 DIAGNOSIS — E87.70 HYPERVOLEMIA, UNSPECIFIED HYPERVOLEMIA TYPE: Primary | ICD-10-CM

## 2022-01-01 DIAGNOSIS — E87.70 HYPERVOLEMIA, UNSPECIFIED HYPERVOLEMIA TYPE: ICD-10-CM

## 2022-01-01 DIAGNOSIS — R10.9 ABDOMINAL PAIN, UNSPECIFIED ABDOMINAL LOCATION: ICD-10-CM

## 2022-01-01 DIAGNOSIS — I38 VALVULAR HEART DISEASE: ICD-10-CM

## 2022-01-01 DIAGNOSIS — K21.9 GASTROESOPHAGEAL REFLUX DISEASE WITHOUT ESOPHAGITIS: Chronic | ICD-10-CM

## 2022-01-01 DIAGNOSIS — E86.0 ACUTE DEHYDRATION: ICD-10-CM

## 2022-01-01 DIAGNOSIS — E03.9 HYPOTHYROIDISM, UNSPECIFIED TYPE: Primary | ICD-10-CM

## 2022-01-01 DIAGNOSIS — Z71.89 ADVANCE CARE PLANNING: ICD-10-CM

## 2022-01-01 DIAGNOSIS — R06.02 SOB (SHORTNESS OF BREATH): ICD-10-CM

## 2022-01-01 LAB
ABS NEUT (OLG): 4.93 (ref 2.1–9.2)
ALBUMIN SERPL-MCNC: 2.5 GM/DL (ref 3.4–4.8)
ALBUMIN SERPL-MCNC: 2.5 GM/DL (ref 3.4–4.8)
ALBUMIN SERPL-MCNC: 2.6 GM/DL (ref 3.4–4.8)
ALBUMIN SERPL-MCNC: 2.8 GM/DL (ref 3.4–4.8)
ALBUMIN SERPL-MCNC: 2.8 GM/DL (ref 3.4–4.8)
ALBUMIN SERPL-MCNC: 3 GM/DL (ref 3.4–4.8)
ALBUMIN SERPL-MCNC: 3.1 G/DL (ref 3.4–4.8)
ALBUMIN SERPL-MCNC: 3.2 GM/DL (ref 3.4–4.8)
ALBUMIN SERPL-MCNC: 3.3 GM/DL (ref 3.4–4.8)
ALBUMIN SERPL-MCNC: 3.3 GM/DL (ref 3.4–4.8)
ALBUMIN SERPL-MCNC: 3.6 G/DL (ref 3.4–4.8)
ALBUMIN SERPL-MCNC: 3.7 GM/DL (ref 3.4–4.8)
ALBUMIN SERPL-MCNC: 3.9 GM/DL (ref 3.4–4.8)
ALBUMIN/GLOB SERPL: 0.7 RATIO (ref 1.1–2)
ALBUMIN/GLOB SERPL: 0.8 RATIO (ref 1.1–2)
ALBUMIN/GLOB SERPL: 0.9 RATIO (ref 1.1–2)
ALBUMIN/GLOB SERPL: 1 RATIO (ref 1.1–2)
ALBUMIN/GLOB SERPL: 1.1 {RATIO} (ref 1.1–2)
ALP SERPL-CCNC: 104 UNIT/L (ref 40–150)
ALP SERPL-CCNC: 119 UNIT/L (ref 40–150)
ALP SERPL-CCNC: 124 UNIT/L (ref 40–150)
ALP SERPL-CCNC: 126 UNIT/L (ref 40–150)
ALP SERPL-CCNC: 149 UNIT/L (ref 40–150)
ALP SERPL-CCNC: 184 UNIT/L (ref 40–150)
ALP SERPL-CCNC: 205 UNIT/L (ref 40–150)
ALP SERPL-CCNC: 299 UNIT/L (ref 40–150)
ALP SERPL-CCNC: 378 UNIT/L (ref 40–150)
ALP SERPL-CCNC: 39 UNIT/L (ref 40–150)
ALP SERPL-CCNC: 47 UNIT/L (ref 40–150)
ALP SERPL-CCNC: 50 UNIT/L (ref 40–150)
ALP SERPL-CCNC: 51 UNIT/L (ref 40–150)
ALP SERPL-CCNC: 54 UNIT/L (ref 40–150)
ALP SERPL-CCNC: 61 UNIT/L (ref 40–150)
ALP SERPL-CCNC: 75 U/L (ref 40–150)
ALT SERPL-CCNC: 13 UNIT/L (ref 0–55)
ALT SERPL-CCNC: 16 UNIT/L (ref 0–55)
ALT SERPL-CCNC: 17 UNIT/L (ref 0–55)
ALT SERPL-CCNC: 18 UNIT/L (ref 0–55)
ALT SERPL-CCNC: 21 UNIT/L (ref 0–55)
ALT SERPL-CCNC: 25 UNIT/L (ref 0–55)
ALT SERPL-CCNC: 28 U/L (ref 0–55)
ALT SERPL-CCNC: 29 UNIT/L (ref 0–55)
ALT SERPL-CCNC: 29 UNIT/L (ref 0–55)
ALT SERPL-CCNC: 35 UNIT/L (ref 0–55)
ANION GAP SERPL CALC-SCNC: 8 MEQ/L
ANISOCYTOSIS BLD QL SMEAR: ABNORMAL
ANISOCYTOSIS BLD QL SMEAR: ABNORMAL
APPEARANCE UR: CLEAR
APTT PPP: 28.8 SECONDS (ref 23.2–33.7)
AST SERPL-CCNC: 39 UNIT/L (ref 5–34)
AST SERPL-CCNC: 45 UNIT/L (ref 5–34)
AST SERPL-CCNC: 45 UNIT/L (ref 5–34)
AST SERPL-CCNC: 46 UNIT/L (ref 5–34)
AST SERPL-CCNC: 47 UNIT/L (ref 5–34)
AST SERPL-CCNC: 47 UNIT/L (ref 5–34)
AST SERPL-CCNC: 48 UNIT/L (ref 5–34)
AST SERPL-CCNC: 49 UNIT/L (ref 5–34)
AST SERPL-CCNC: 49 UNIT/L (ref 5–34)
AST SERPL-CCNC: 54 UNIT/L (ref 5–34)
AST SERPL-CCNC: 70 UNIT/L (ref 5–34)
AST SERPL-CCNC: 72 UNIT/L (ref 5–34)
AST SERPL-CCNC: 77 UNIT/L (ref 5–34)
AST SERPL-CCNC: 81 U/L (ref 5–34)
AST SERPL-CCNC: 81 UNIT/L (ref 5–34)
AST SERPL-CCNC: 88 UNIT/L (ref 5–34)
AV INDEX (PROSTH): 0.44
AV MEAN GRADIENT: 10 MMHG
AV PEAK GRADIENT: 18 MMHG
AV VALVE AREA: 1.52 CM2
AV VELOCITY RATIO: 0.45
BACTERIA #/AREA URNS AUTO: ABNORMAL /HPF
BACTERIA #/AREA URNS AUTO: NORMAL /HPF
BACTERIA #/AREA URNS AUTO: NORMAL /HPF
BASOPHILS # BLD AUTO: 0.03 X10(3)/MCL (ref 0–0.2)
BASOPHILS # BLD AUTO: 0.05 X10(3)/MCL (ref 0–0.2)
BASOPHILS # BLD AUTO: 0.06 X10(3)/MCL (ref 0–0.2)
BASOPHILS # BLD AUTO: 0.07 X10(3)/MCL (ref 0–0.2)
BASOPHILS # BLD AUTO: 0.08 X10(3)/MCL (ref 0–0.2)
BASOPHILS # BLD AUTO: 0.08 X10(3)/MCL (ref 0–0.2)
BASOPHILS # BLD AUTO: 0.09 X10(3)/MCL (ref 0–0.2)
BASOPHILS # BLD AUTO: 0.1 10*3/UL (ref 0–0.2)
BASOPHILS NFR BLD AUTO: 0.4 %
BASOPHILS NFR BLD AUTO: 0.4 %
BASOPHILS NFR BLD AUTO: 0.7 %
BASOPHILS NFR BLD AUTO: 0.8 %
BASOPHILS NFR BLD AUTO: 0.9 %
BASOPHILS NFR BLD AUTO: 1 %
BASOPHILS NFR BLD AUTO: 1.1 %
BASOPHILS NFR BLD AUTO: 1.2 %
BASOPHILS NFR BLD AUTO: 1.4 %
BILIRUB SERPL-MCNC: 0.9 MG/DL
BILIRUB UR QL STRIP.AUTO: NEGATIVE MG/DL
BILIRUBIN DIRECT+TOT PNL SERPL-MCNC: 0.4 (ref 0–0.8)
BILIRUBIN DIRECT+TOT PNL SERPL-MCNC: 0.5 (ref 0–0.5)
BILIRUBIN DIRECT+TOT PNL SERPL-MCNC: 0.8 MG/DL
BILIRUBIN DIRECT+TOT PNL SERPL-MCNC: 0.9 MG/DL
BILIRUBIN DIRECT+TOT PNL SERPL-MCNC: 1 MG/DL
BILIRUBIN DIRECT+TOT PNL SERPL-MCNC: 1.1 MG/DL
BILIRUBIN DIRECT+TOT PNL SERPL-MCNC: 1.1 MG/DL
BILIRUBIN DIRECT+TOT PNL SERPL-MCNC: 1.2 MG/DL
BILIRUBIN DIRECT+TOT PNL SERPL-MCNC: 1.4 MG/DL
BNP BLD-MCNC: 1032.4 PG/ML
BNP BLD-MCNC: 1110.4 PG/ML
BNP BLD-MCNC: 1896 PG/ML
BNP BLD-MCNC: 4952.5 PG/ML
BSA FOR ECHO PROCEDURE: 1.52 M2
BUN SERPL-MCNC: 26 MG/DL (ref 9.8–20.1)
BUN SERPL-MCNC: 27.8 MG/DL (ref 9.8–20.1)
BUN SERPL-MCNC: 47.7 MG/DL (ref 9.8–20.1)
BUN SERPL-MCNC: 52.1 MG/DL (ref 9.8–20.1)
BUN SERPL-MCNC: 52.9 MG/DL (ref 9.8–20.1)
BUN SERPL-MCNC: 53.6 MG/DL (ref 9.8–20.1)
BUN SERPL-MCNC: 57 MG/DL (ref 9.8–20.1)
BUN SERPL-MCNC: 57.1 MG/DL (ref 9.8–20.1)
BUN SERPL-MCNC: 58.5 MG/DL (ref 9.8–20.1)
BUN SERPL-MCNC: 59.4 MG/DL (ref 9.8–20.1)
BUN SERPL-MCNC: 59.6 MG/DL (ref 9.8–20.1)
BUN SERPL-MCNC: 59.8 MG/DL (ref 9.8–20.1)
BUN SERPL-MCNC: 62.5 MG/DL (ref 9.8–20.1)
BUN SERPL-MCNC: 65.2 MG/DL (ref 9.8–20.1)
BUN SERPL-MCNC: 77.9 MG/DL (ref 9.8–20.1)
BUN SERPL-MCNC: 88.1 MG/DL (ref 9.8–20.1)
BUN SERPL-MCNC: 89.6 MG/DL (ref 9.8–20.1)
CALCIUM SERPL-MCNC: 10 MG/DL (ref 8.4–10.2)
CALCIUM SERPL-MCNC: 10.1 MG/DL (ref 8.4–10.2)
CALCIUM SERPL-MCNC: 10.1 MG/DL (ref 8.4–10.2)
CALCIUM SERPL-MCNC: 10.4 MG/DL (ref 8.4–10.2)
CALCIUM SERPL-MCNC: 10.6 MG/DL (ref 8.4–10.2)
CALCIUM SERPL-MCNC: 10.6 MG/DL (ref 8.4–10.2)
CALCIUM SERPL-MCNC: 10.8 MG/DL (ref 8.4–10.2)
CALCIUM SERPL-MCNC: 8.5 MG/DL (ref 8.4–10.2)
CALCIUM SERPL-MCNC: 9.1 MG/DL (ref 8.4–10.2)
CALCIUM SERPL-MCNC: 9.2 MG/DL (ref 8.4–10.2)
CALCIUM SERPL-MCNC: 9.4 MG/DL (ref 8.4–10.2)
CALCIUM SERPL-MCNC: 9.5 MG/DL (ref 8.4–10.2)
CALCIUM SERPL-MCNC: 9.6 MG/DL (ref 8.4–10.2)
CALCIUM SERPL-MCNC: 9.7 MG/DL (ref 8.4–10.2)
CALCIUM SERPL-MCNC: 9.8 MG/DL (ref 8.7–10.5)
CALCIUM SERPL-MCNC: 9.9 MG/DL (ref 8.4–10.2)
CALCIUM SERPL-MCNC: 9.9 MG/DL (ref 8.4–10.2)
CHLORIDE SERPL-SCNC: 101 MMOL/L (ref 98–107)
CHLORIDE SERPL-SCNC: 102 MMOL/L (ref 98–107)
CHLORIDE SERPL-SCNC: 103 MMOL/L (ref 98–107)
CHLORIDE SERPL-SCNC: 103 MMOL/L (ref 98–107)
CHLORIDE SERPL-SCNC: 104 MMOL/L (ref 98–107)
CHLORIDE SERPL-SCNC: 105 MMOL/L (ref 98–107)
CHLORIDE SERPL-SCNC: 90 MMOL/L (ref 98–107)
CHLORIDE SERPL-SCNC: 93 MMOL/L (ref 98–107)
CHLORIDE SERPL-SCNC: 95 MMOL/L (ref 98–107)
CHLORIDE SERPL-SCNC: 96 MMOL/L (ref 98–107)
CHLORIDE SERPL-SCNC: 97 MMOL/L (ref 98–107)
CHLORIDE SERPL-SCNC: 97 MMOL/L (ref 98–107)
CHLORIDE SERPL-SCNC: 98 MMOL/L (ref 98–107)
CHOLEST SERPL-MCNC: 105 MG/DL
CHOLEST/HDLC SERPL: 3 {RATIO} (ref 0–5)
CO2 SERPL-SCNC: 24 MMOL/L (ref 23–31)
CO2 SERPL-SCNC: 26 MMOL/L (ref 23–31)
CO2 SERPL-SCNC: 29 MMOL/L (ref 23–31)
CO2 SERPL-SCNC: 29 MMOL/L (ref 23–31)
CO2 SERPL-SCNC: 30 MMOL/L (ref 23–31)
CO2 SERPL-SCNC: 31 MMOL/L (ref 23–31)
CO2 SERPL-SCNC: 33 MMOL/L (ref 23–31)
CO2 SERPL-SCNC: 34 MMOL/L (ref 23–31)
CO2 SERPL-SCNC: 36 MMOL/L (ref 23–31)
CO2 SERPL-SCNC: 37 MMOL/L (ref 23–31)
CO2 SERPL-SCNC: 38 MMOL/L (ref 23–31)
CO2 SERPL-SCNC: 38 MMOL/L (ref 23–31)
CO2 SERPL-SCNC: 39 MMOL/L (ref 23–31)
CO2 SERPL-SCNC: 40 MMOL/L (ref 23–31)
COLOR STL: NORMAL
COLOR UR AUTO: YELLOW
CONSISTENCY STL: NORMAL
CREAT SERPL-MCNC: 1.12 MG/DL (ref 0.55–1.02)
CREAT SERPL-MCNC: 1.28 MG/DL (ref 0.55–1.02)
CREAT SERPL-MCNC: 1.47 MG/DL (ref 0.55–1.02)
CREAT SERPL-MCNC: 1.56 MG/DL (ref 0.55–1.02)
CREAT SERPL-MCNC: 1.57 MG/DL (ref 0.55–1.02)
CREAT SERPL-MCNC: 1.58 MG/DL (ref 0.55–1.02)
CREAT SERPL-MCNC: 1.61 MG/DL (ref 0.55–1.02)
CREAT SERPL-MCNC: 1.67 MG/DL (ref 0.55–1.02)
CREAT SERPL-MCNC: 1.86 MG/DL (ref 0.55–1.02)
CREAT SERPL-MCNC: 1.89 MG/DL (ref 0.55–1.02)
CREAT SERPL-MCNC: 2.2 MG/DL (ref 0.55–1.02)
CREAT SERPL-MCNC: 2.23 MG/DL (ref 0.55–1.02)
CREAT SERPL-MCNC: 2.34 MG/DL (ref 0.55–1.02)
CREAT SERPL-MCNC: 2.4 MG/DL (ref 0.55–1.02)
CREAT SERPL-MCNC: 2.49 MG/DL (ref 0.55–1.02)
CREAT SERPL-MCNC: 2.56 MG/DL (ref 0.55–1.02)
CREAT SERPL-MCNC: 3.08 MG/DL (ref 0.55–1.02)
CREAT UR-MCNC: 10.2 MG/DL (ref 47–110)
CREAT UR-MCNC: 19.1 MG/DL (ref 47–110)
CREAT/UREA NIT SERPL: 22
CV ECHO LV RWT: 0.35 CM
DEPRECATED CALCIDIOL+CALCIFEROL SERPL-MC: 57.3 NG/ML (ref 30–80)
DEPRECATED CALCIDIOL+CALCIFEROL SERPL-MC: 64.3 NG/ML (ref 30–80)
DOP CALC AO PEAK VEL: 2.14 M/S
DOP CALC AO VTI: 38.5 CM
DOP CALC LVOT AREA: 3.5 CM2
DOP CALC LVOT DIAMETER: 2.1 CM
DOP CALC LVOT PEAK VEL: 0.96 M/S
DOP CALC LVOT STROKE VOLUME: 58.51 CM3
DOP CALC MV VTI: 66.4 CM
DOP CALCLVOT PEAK VEL VTI: 16.9 CM
E WAVE DECELERATION TIME: 246 MSEC
E/A RATIO: 1.39
E/E' RATIO: 48.67 M/S
ECHO LV POSTERIOR WALL: 0.92 CM (ref 0.6–1.1)
EJECTION FRACTION: 50 %
EOSINOPHIL # BLD AUTO: 0.02 X10(3)/MCL (ref 0–0.9)
EOSINOPHIL # BLD AUTO: 0.02 X10(3)/MCL (ref 0–0.9)
EOSINOPHIL # BLD AUTO: 0.04 X10(3)/MCL (ref 0–0.9)
EOSINOPHIL # BLD AUTO: 0.07 X10(3)/MCL (ref 0–0.9)
EOSINOPHIL # BLD AUTO: 0.1 10*3/UL (ref 0–0.9)
EOSINOPHIL # BLD AUTO: 0.1 X10(3)/MCL (ref 0–0.9)
EOSINOPHIL # BLD AUTO: 0.11 X10(3)/MCL (ref 0–0.9)
EOSINOPHIL # BLD AUTO: 0.12 X10(3)/MCL (ref 0–0.9)
EOSINOPHIL # BLD AUTO: 0.15 X10(3)/MCL (ref 0–0.9)
EOSINOPHIL # BLD AUTO: 0.15 X10(3)/MCL (ref 0–0.9)
EOSINOPHIL # BLD AUTO: 0.17 X10(3)/MCL (ref 0–0.9)
EOSINOPHIL # BLD AUTO: 0.2 X10(3)/MCL (ref 0–0.9)
EOSINOPHIL # BLD AUTO: 0.22 X10(3)/MCL (ref 0–0.9)
EOSINOPHIL # BLD AUTO: 0.23 X10(3)/MCL (ref 0–0.9)
EOSINOPHIL # BLD AUTO: 0.25 X10(3)/MCL (ref 0–0.9)
EOSINOPHIL NFR BLD AUTO: 0.2 %
EOSINOPHIL NFR BLD AUTO: 0.2 %
EOSINOPHIL NFR BLD AUTO: 0.6 %
EOSINOPHIL NFR BLD AUTO: 1 %
EOSINOPHIL NFR BLD AUTO: 1.4 %
EOSINOPHIL NFR BLD AUTO: 2.1 %
EOSINOPHIL NFR BLD AUTO: 2.2 %
EOSINOPHIL NFR BLD AUTO: 2.3 %
EOSINOPHIL NFR BLD AUTO: 2.7 %
EOSINOPHIL NFR BLD AUTO: 2.8 %
EOSINOPHIL NFR BLD AUTO: 2.8 %
EOSINOPHIL NFR BLD AUTO: 3.3 %
EOSINOPHIL NFR BLD AUTO: 3.4 %
ERYTHROCYTE [DISTWIDTH] IN BLOOD BY AUTOMATED COUNT: 15.3 % (ref 11.5–17)
ERYTHROCYTE [DISTWIDTH] IN BLOOD BY AUTOMATED COUNT: 17.2 % (ref 11.5–17)
ERYTHROCYTE [DISTWIDTH] IN BLOOD BY AUTOMATED COUNT: 17.2 % (ref 11.5–17)
ERYTHROCYTE [DISTWIDTH] IN BLOOD BY AUTOMATED COUNT: 17.3 % (ref 11.5–17)
ERYTHROCYTE [DISTWIDTH] IN BLOOD BY AUTOMATED COUNT: 17.3 % (ref 11.5–17)
ERYTHROCYTE [DISTWIDTH] IN BLOOD BY AUTOMATED COUNT: 18.1 % (ref 11.5–17)
ERYTHROCYTE [DISTWIDTH] IN BLOOD BY AUTOMATED COUNT: 19.3 % (ref 11.5–17)
ERYTHROCYTE [DISTWIDTH] IN BLOOD BY AUTOMATED COUNT: 20.7 % (ref 11.5–17)
ERYTHROCYTE [DISTWIDTH] IN BLOOD BY AUTOMATED COUNT: 21.4 % (ref 11.5–17)
ERYTHROCYTE [DISTWIDTH] IN BLOOD BY AUTOMATED COUNT: 21.4 % (ref 11.5–17)
ERYTHROCYTE [DISTWIDTH] IN BLOOD BY AUTOMATED COUNT: 21.5 % (ref 11.5–17)
ERYTHROCYTE [DISTWIDTH] IN BLOOD BY AUTOMATED COUNT: 22.8 % (ref 11.5–17)
ERYTHROCYTE [DISTWIDTH] IN BLOOD BY AUTOMATED COUNT: 24 % (ref 11.5–17)
ERYTHROCYTE [DISTWIDTH] IN BLOOD BY AUTOMATED COUNT: 25.5 % (ref 11.5–17)
ERYTHROCYTE [DISTWIDTH] IN BLOOD BY AUTOMATED COUNT: 29.6 % (ref 11–14.5)
FERRITIN SERPL-MCNC: 21.35 NG/ML (ref 4.63–204)
FRACTIONAL SHORTENING: 22 % (ref 28–44)
GFR SERPLBLD CREATININE-BSD FMLA CKD-EPI: 14 MLS/MIN/1.73/M2
GFR SERPLBLD CREATININE-BSD FMLA CKD-EPI: 18 MLS/MIN/1.73/M2
GFR SERPLBLD CREATININE-BSD FMLA CKD-EPI: 19 MLS/MIN/1.73/M2
GFR SERPLBLD CREATININE-BSD FMLA CKD-EPI: 21 MLS/MIN/1.73/M2
GFR SERPLBLD CREATININE-BSD FMLA CKD-EPI: 25 MLS/MIN/1.73/M2
GFR SERPLBLD CREATININE-BSD FMLA CKD-EPI: 26 MLS/MIN/1.73/M2
GFR SERPLBLD CREATININE-BSD FMLA CKD-EPI: 29 MLS/MIN/1.73/M2
GFR SERPLBLD CREATININE-BSD FMLA CKD-EPI: 31 MLS/MIN/1.73/M2
GFR SERPLBLD CREATININE-BSD FMLA CKD-EPI: 31 MLS/MIN/1.73/M2
GFR SERPLBLD CREATININE-BSD FMLA CKD-EPI: 32 MLS/MIN/1.73/M2
GFR SERPLBLD CREATININE-BSD FMLA CKD-EPI: 32 MLS/MIN/1.73/M2
GFR SERPLBLD CREATININE-BSD FMLA CKD-EPI: 34 MLS/MIN/1.73/M2
GLOBULIN SER-MCNC: 3 GM/DL (ref 2.4–3.5)
GLOBULIN SER-MCNC: 3.1 GM/DL (ref 2.4–3.5)
GLOBULIN SER-MCNC: 3.1 GM/DL (ref 2.4–3.5)
GLOBULIN SER-MCNC: 3.2 G/DL (ref 2.4–3.5)
GLOBULIN SER-MCNC: 3.2 GM/DL (ref 2.4–3.5)
GLOBULIN SER-MCNC: 3.2 GM/DL (ref 2.4–3.5)
GLOBULIN SER-MCNC: 3.3 GM/DL (ref 2.4–3.5)
GLOBULIN SER-MCNC: 3.4 GM/DL (ref 2.4–3.5)
GLOBULIN SER-MCNC: 3.5 GM/DL (ref 2.4–3.5)
GLOBULIN SER-MCNC: 3.6 GM/DL (ref 2.4–3.5)
GLOBULIN SER-MCNC: 4 GM/DL (ref 2.4–3.5)
GLUCOSE SERPL-MCNC: 100 MG/DL (ref 82–115)
GLUCOSE SERPL-MCNC: 104 MG/DL (ref 82–115)
GLUCOSE SERPL-MCNC: 107 MG/DL (ref 82–115)
GLUCOSE SERPL-MCNC: 114 MG/DL (ref 82–115)
GLUCOSE SERPL-MCNC: 127 MG/DL (ref 82–115)
GLUCOSE SERPL-MCNC: 137 MG/DL (ref 82–115)
GLUCOSE SERPL-MCNC: 137 MG/DL (ref 82–115)
GLUCOSE SERPL-MCNC: 143 MG/DL (ref 82–115)
GLUCOSE SERPL-MCNC: 146 MG/DL (ref 82–115)
GLUCOSE SERPL-MCNC: 85 MG/DL (ref 82–115)
GLUCOSE SERPL-MCNC: 86 MG/DL (ref 82–115)
GLUCOSE SERPL-MCNC: 91 MG/DL (ref 82–115)
GLUCOSE SERPL-MCNC: 91 MG/DL (ref 82–115)
GLUCOSE SERPL-MCNC: 92 MG/DL (ref 82–115)
GLUCOSE SERPL-MCNC: 93 MG/DL (ref 82–115)
GLUCOSE SERPL-MCNC: 93 MG/DL (ref 82–115)
GLUCOSE SERPL-MCNC: 99 MG/DL (ref 82–115)
GLUCOSE UR QL STRIP.AUTO: NEGATIVE MG/DL
HCT VFR BLD AUTO: 30.8 % (ref 37–47)
HCT VFR BLD AUTO: 31 % (ref 37–47)
HCT VFR BLD AUTO: 32.1 % (ref 37–47)
HCT VFR BLD AUTO: 32.2 % (ref 37–47)
HCT VFR BLD AUTO: 32.9 % (ref 37–47)
HCT VFR BLD AUTO: 33.4 % (ref 37–47)
HCT VFR BLD AUTO: 33.7 % (ref 37–47)
HCT VFR BLD AUTO: 34.2 % (ref 37–47)
HCT VFR BLD AUTO: 34.2 % (ref 37–47)
HCT VFR BLD AUTO: 34.6 % (ref 37–47)
HCT VFR BLD AUTO: 34.8 % (ref 37–47)
HCT VFR BLD AUTO: 35.9 % (ref 37–47)
HCT VFR BLD AUTO: 37.6 % (ref 37–47)
HCT VFR BLD AUTO: 41.2 % (ref 37–47)
HCT VFR BLD AUTO: 42.2 % (ref 37–47)
HDLC SERPL-MCNC: 34 MG/DL (ref 35–60)
HEMOCCULT SP1 STL QL: NEGATIVE
HEMOLYSIS INTERF INDEX SERPL-ACNC: 10
HGB BLD-MCNC: 10.2 GM/DL (ref 12–16)
HGB BLD-MCNC: 10.8 GM/DL (ref 12–16)
HGB BLD-MCNC: 11.2 GM/DL (ref 12–16)
HGB BLD-MCNC: 12.3 GM/DL (ref 12–16)
HGB BLD-MCNC: 8 GM/DL (ref 12–16)
HGB BLD-MCNC: 8.6 GM/DL (ref 12–16)
HGB BLD-MCNC: 8.8 GM/DL (ref 12–16)
HGB BLD-MCNC: 8.8 GM/DL (ref 12–16)
HGB BLD-MCNC: 8.9 GM/DL (ref 12–16)
HGB BLD-MCNC: 9.2 GM/DL (ref 12–16)
HGB BLD-MCNC: 9.5 GM/DL (ref 12–16)
HGB BLD-MCNC: 9.6 G/DL (ref 12–16)
HGB BLD-MCNC: 9.8 GM/DL (ref 12–16)
HYPOCHROMIA BLD QL SMEAR: ABNORMAL
HYPOCHROMIA BLD QL SMEAR: ABNORMAL
ICTERIC INTERF INDEX SERPL-ACNC: 1
IMM GRANULOCYTES # BLD AUTO: 0.01 X10(3)/MCL (ref 0–0.04)
IMM GRANULOCYTES # BLD AUTO: 0.02 X10(3)/MCL (ref 0–0.02)
IMM GRANULOCYTES # BLD AUTO: 0.02 X10(3)/MCL (ref 0–0.02)
IMM GRANULOCYTES # BLD AUTO: 0.02 X10(3)/MCL (ref 0–0.04)
IMM GRANULOCYTES # BLD AUTO: 0.02 X10(3)/MCL (ref 0–0.04)
IMM GRANULOCYTES # BLD AUTO: 0.03 X10(3)/MCL (ref 0–0.04)
IMM GRANULOCYTES # BLD AUTO: 0.04 X10(3)/MCL (ref 0–0.02)
IMM GRANULOCYTES # BLD AUTO: 0.04 X10(3)/MCL (ref 0–0.04)
IMM GRANULOCYTES NFR BLD AUTO: 0.2 %
IMM GRANULOCYTES NFR BLD AUTO: 0.3 %
IMM GRANULOCYTES NFR BLD AUTO: 0.3 % (ref 0–0.43)
IMM GRANULOCYTES NFR BLD AUTO: 0.3 % (ref 0–0.43)
IMM GRANULOCYTES NFR BLD AUTO: 0.4 %
IMM GRANULOCYTES NFR BLD AUTO: 0.5 % (ref 0–0.43)
INR BLD: 1.1 (ref 0–1.3)
INR BLD: 1.89 (ref 0–1.3)
INTERVENTRICULAR SEPTUM: 1.24 CM (ref 0.6–1.1)
IRON SATN MFR SERPL: 5 % (ref 20–50)
IRON SERPL-MCNC: 24 UG/DL (ref 50–170)
KETONES UR QL STRIP.AUTO: NEGATIVE MG/DL
LACTATE SERPL-SCNC: 2 MMOL/L (ref 0.5–2.2)
LDLC SERPL CALC-MCNC: 52 MG/DL (ref 50–140)
LEFT ATRIUM SIZE: 4.4 CM
LEFT ATRIUM VOLUME INDEX MOD: 33.1 ML/M2
LEFT ATRIUM VOLUME MOD: 49.6 CM3
LEFT INTERNAL DIMENSION IN SYSTOLE: 4.16 CM (ref 2.1–4)
LEFT VENTRICLE DIASTOLIC VOLUME INDEX: 91.33 ML/M2
LEFT VENTRICLE DIASTOLIC VOLUME: 137 ML
LEFT VENTRICLE MASS INDEX: 149 G/M2
LEFT VENTRICLE SYSTOLIC VOLUME INDEX: 51.2 ML/M2
LEFT VENTRICLE SYSTOLIC VOLUME: 76.8 ML
LEFT VENTRICULAR INTERNAL DIMENSION IN DIASTOLE: 5.32 CM (ref 3.5–6)
LEFT VENTRICULAR MASS: 223.53 G
LEUKOCYTE ESTERASE UR QL STRIP.AUTO: ABNORMAL UNIT/L
LEUKOCYTE ESTERASE UR QL STRIP.AUTO: ABNORMAL UNIT/L
LEUKOCYTE ESTERASE UR QL STRIP.AUTO: NEGATIVE UNIT/L
LIPASE SERPL-CCNC: 125 U/L
LIPASE SERPL-CCNC: 133 U/L
LIPEMIC INTERF INDEX SERPL-ACNC: 3
LV LATERAL E/E' RATIO: 43.8 M/S
LV SEPTAL E/E' RATIO: 54.75 M/S
LVOT MG: 2 MMHG
LVOT MV: 0.6 CM/S
LYMPHOCYTES # BLD AUTO: 0.61 X10(3)/MCL (ref 0.6–4.6)
LYMPHOCYTES # BLD AUTO: 0.63 X10(3)/MCL (ref 0.6–4.6)
LYMPHOCYTES # BLD AUTO: 0.68 X10(3)/MCL (ref 0.6–4.6)
LYMPHOCYTES # BLD AUTO: 0.73 X10(3)/MCL (ref 0.6–4.6)
LYMPHOCYTES # BLD AUTO: 0.84 X10(3)/MCL (ref 0.6–4.6)
LYMPHOCYTES # BLD AUTO: 0.84 X10(3)/MCL (ref 0.6–4.6)
LYMPHOCYTES # BLD AUTO: 0.85 X10(3)/MCL (ref 0.6–4.6)
LYMPHOCYTES # BLD AUTO: 0.88 X10(3)/MCL (ref 0.6–4.6)
LYMPHOCYTES # BLD AUTO: 0.92 X10(3)/MCL (ref 0.6–4.6)
LYMPHOCYTES # BLD AUTO: 0.94 X10(3)/MCL (ref 0.6–4.6)
LYMPHOCYTES # BLD AUTO: 0.95 X10(3)/MCL (ref 0.6–4.6)
LYMPHOCYTES # BLD AUTO: 1.13 X10(3)/MCL (ref 0.6–4.6)
LYMPHOCYTES # BLD AUTO: 1.4 10*3/UL (ref 0.6–4.6)
LYMPHOCYTES # BLD AUTO: 1.47 X10(3)/MCL (ref 0.6–4.6)
LYMPHOCYTES # BLD AUTO: 1.52 X10(3)/MCL (ref 0.6–4.6)
LYMPHOCYTES NFR BLD AUTO: 10.2 %
LYMPHOCYTES NFR BLD AUTO: 12.6 %
LYMPHOCYTES NFR BLD AUTO: 12.6 %
LYMPHOCYTES NFR BLD AUTO: 13.1 %
LYMPHOCYTES NFR BLD AUTO: 14.2 %
LYMPHOCYTES NFR BLD AUTO: 14.3 %
LYMPHOCYTES NFR BLD AUTO: 14.6 %
LYMPHOCYTES NFR BLD AUTO: 15.9 %
LYMPHOCYTES NFR BLD AUTO: 20 %
LYMPHOCYTES NFR BLD AUTO: 20.2 %
LYMPHOCYTES NFR BLD AUTO: 21.9 %
LYMPHOCYTES NFR BLD AUTO: 7.8 %
LYMPHOCYTES NFR BLD AUTO: 8.6 %
LYMPHOCYTES NFR BLD AUTO: 9 %
LYMPHOCYTES NFR BLD AUTO: 9.9 %
MACROCYTES BLD QL SMEAR: ABNORMAL
MAGNESIUM SERPL-MCNC: 1.8 MG/DL (ref 1.6–2.6)
MAGNESIUM SERPL-MCNC: 1.9 MG/DL (ref 1.6–2.6)
MAGNESIUM SERPL-MCNC: 1.9 MG/DL (ref 1.6–2.6)
MAGNESIUM SERPL-MCNC: 1.96 MG/DL (ref 1.6–2.6)
MANUAL DIFF? (OHS): NO
MCH RBC QN AUTO: 18.3 PG (ref 27–31)
MCH RBC QN AUTO: 18.3 PG (ref 27–31)
MCH RBC QN AUTO: 18.5 PG (ref 27–31)
MCH RBC QN AUTO: 18.5 PG (ref 27–31)
MCH RBC QN AUTO: 18.6 PG (ref 27–31)
MCH RBC QN AUTO: 18.9 PG (ref 27–31)
MCH RBC QN AUTO: 19.6 PG (ref 27–31)
MCH RBC QN AUTO: 20 PG
MCH RBC QN AUTO: 21 PG (ref 27–31)
MCH RBC QN AUTO: 21.6 PG (ref 27–31)
MCH RBC QN AUTO: 22.3 PG (ref 27–31)
MCH RBC QN AUTO: 24.8 PG (ref 27–31)
MCH RBC QN AUTO: 24.9 PG (ref 27–31)
MCH RBC QN AUTO: 25.1 PG (ref 27–31)
MCH RBC QN AUTO: 27.5 PG (ref 27–31)
MCHC RBC AUTO-ENTMCNC: 25.7 MG/DL (ref 33–36)
MCHC RBC AUTO-ENTMCNC: 26 MG/DL (ref 33–36)
MCHC RBC AUTO-ENTMCNC: 26.2 MG/DL (ref 33–36)
MCHC RBC AUTO-ENTMCNC: 26.3 MG/DL (ref 33–36)
MCHC RBC AUTO-ENTMCNC: 26.4 MG/DL (ref 33–36)
MCHC RBC AUTO-ENTMCNC: 26.5 MG/DL (ref 33–36)
MCHC RBC AUTO-ENTMCNC: 26.6 MG/DL (ref 33–36)
MCHC RBC AUTO-ENTMCNC: 26.7 MG/DL (ref 33–36)
MCHC RBC AUTO-ENTMCNC: 28.2 MG/DL (ref 33–36)
MCHC RBC AUTO-ENTMCNC: 28.7 MG/DL (ref 33–36)
MCHC RBC AUTO-ENTMCNC: 29.1 MG/DL (ref 33–36)
MCHC RBC AUTO-ENTMCNC: 29.2 G/DL (ref 33–36)
MCHC RBC AUTO-ENTMCNC: 29.7 MG/DL (ref 33–36)
MCHC RBC AUTO-ENTMCNC: 29.8 MG/DL (ref 33–36)
MCHC RBC AUTO-ENTMCNC: 29.8 MG/DL (ref 33–36)
MCV RBC AUTO: 69.5 FL (ref 80–94)
MCV RBC AUTO: 69.9 FL (ref 80–94)
MCV RBC AUTO: 70.3 FL (ref 80–94)
MCV RBC AUTO: 70.3 FL (ref 80–94)
MCV RBC AUTO: 71 FL (ref 80–94)
MCV RBC AUTO: 71.2 FL (ref 80–94)
MCV RBC AUTO: 72.8 FL (ref 80–94)
MCV RBC AUTO: 73.3 FL (ref 80–94)
MCV RBC AUTO: 74 FL (ref 80–94)
MCV RBC AUTO: 76.4 FL (ref 80–94)
MCV RBC AUTO: 79.3 FL (ref 80–94)
MCV RBC AUTO: 83.7 FL (ref 80–94)
MCV RBC AUTO: 84 FL (ref 80–94)
MCV RBC AUTO: 85.3 FL (ref 80–94)
MCV RBC AUTO: 94.3 FL (ref 80–94)
MICROCYTES BLD QL SMEAR: ABNORMAL
MONOCYTES # BLD AUTO: 0.34 X10(3)/MCL (ref 0.1–1.3)
MONOCYTES # BLD AUTO: 0.46 X10(3)/MCL (ref 0.1–1.3)
MONOCYTES # BLD AUTO: 0.46 X10(3)/MCL (ref 0.1–1.3)
MONOCYTES # BLD AUTO: 0.49 X10(3)/MCL (ref 0.1–1.3)
MONOCYTES # BLD AUTO: 0.5 X10(3)/MCL (ref 0.1–1.3)
MONOCYTES # BLD AUTO: 0.54 X10(3)/MCL (ref 0.1–1.3)
MONOCYTES # BLD AUTO: 0.57 X10(3)/MCL (ref 0.1–1.3)
MONOCYTES # BLD AUTO: 0.58 X10(3)/MCL (ref 0.1–1.3)
MONOCYTES # BLD AUTO: 0.62 X10(3)/MCL (ref 0.1–1.3)
MONOCYTES # BLD AUTO: 0.64 X10(3)/MCL (ref 0.1–1.3)
MONOCYTES # BLD AUTO: 0.64 X10(3)/MCL (ref 0.1–1.3)
MONOCYTES # BLD AUTO: 0.69 X10(3)/MCL (ref 0.1–1.3)
MONOCYTES # BLD AUTO: 0.7 10*3/UL (ref 0.1–1.3)
MONOCYTES # BLD AUTO: 0.81 X10(3)/MCL (ref 0.1–1.3)
MONOCYTES # BLD AUTO: 0.86 X10(3)/MCL (ref 0.1–1.3)
MONOCYTES NFR BLD AUTO: 10 %
MONOCYTES NFR BLD AUTO: 10.4 %
MONOCYTES NFR BLD AUTO: 11.1 %
MONOCYTES NFR BLD AUTO: 6.7 %
MONOCYTES NFR BLD AUTO: 6.7 %
MONOCYTES NFR BLD AUTO: 6.8 %
MONOCYTES NFR BLD AUTO: 6.8 %
MONOCYTES NFR BLD AUTO: 7.2 %
MONOCYTES NFR BLD AUTO: 7.2 %
MONOCYTES NFR BLD AUTO: 7.5 %
MONOCYTES NFR BLD AUTO: 7.7 %
MONOCYTES NFR BLD AUTO: 8.8 %
MONOCYTES NFR BLD AUTO: 9 %
MONOCYTES NFR BLD AUTO: 9.2 %
MONOCYTES NFR BLD AUTO: 9.3 %
MV MEAN GRADIENT: 10 MMHG
MV PEAK A VEL: 1.58 M/S
MV PEAK E VEL: 2.19 M/S
MV PEAK GRADIENT: 19 MMHG
MV STENOSIS PRESSURE HALF TIME: 122 MS
MV VALVE AREA BY CONTINUITY EQUATION: 0.88 CM2
MV VALVE AREA P 1/2 METHOD: 1.8 CM2
NEUTROPHILS # BLD AUTO: 3.24 X10(3)/MCL (ref 2.1–9.2)
NEUTROPHILS # BLD AUTO: 3.8 X10(3)/MCL (ref 2.1–9.2)
NEUTROPHILS # BLD AUTO: 4.1 X10(3)/MCL (ref 2.1–9.2)
NEUTROPHILS # BLD AUTO: 4.6 X10(3)/MCL (ref 2.1–9.2)
NEUTROPHILS # BLD AUTO: 4.9 X10(3)/MCL (ref 2.1–9.2)
NEUTROPHILS # BLD AUTO: 4.93 10*3/UL (ref 2.1–9.2)
NEUTROPHILS # BLD AUTO: 5 X10(3)/MCL (ref 2.1–9.2)
NEUTROPHILS # BLD AUTO: 5.5 X10(3)/MCL (ref 2.1–9.2)
NEUTROPHILS # BLD AUTO: 5.7 X10(3)/MCL (ref 2.1–9.2)
NEUTROPHILS # BLD AUTO: 6.4 X10(3)/MCL (ref 2.1–9.2)
NEUTROPHILS # BLD AUTO: 6.7 X10(3)/MCL (ref 2.1–9.2)
NEUTROPHILS # BLD AUTO: 6.8 X10(3)/MCL (ref 2.1–9.2)
NEUTROPHILS # BLD AUTO: 9.5 X10(3)/MCL (ref 2.1–9.2)
NEUTROPHILS NFR BLD AUTO: 66.3 %
NEUTROPHILS NFR BLD AUTO: 67.4 %
NEUTROPHILS NFR BLD AUTO: 68 %
NEUTROPHILS NFR BLD AUTO: 71.3 %
NEUTROPHILS NFR BLD AUTO: 71.5 %
NEUTROPHILS NFR BLD AUTO: 73.1 %
NEUTROPHILS NFR BLD AUTO: 73.9 %
NEUTROPHILS NFR BLD AUTO: 74.9 %
NEUTROPHILS NFR BLD AUTO: 75.9 %
NEUTROPHILS NFR BLD AUTO: 76.1 %
NEUTROPHILS NFR BLD AUTO: 79.6 %
NEUTROPHILS NFR BLD AUTO: 80.6 %
NEUTROPHILS NFR BLD AUTO: 80.7 %
NEUTROPHILS NFR BLD AUTO: 80.9 %
NEUTROPHILS NFR BLD AUTO: 84 %
NITRITE UR QL STRIP.AUTO: NEGATIVE
NRBC BLD AUTO-RTO: 0 %
NRBC BLD AUTO-RTO: 0 % (ref 0–1)
NRBC BLD AUTO-RTO: 0.3 %
NRBC BLD AUTO-RTO: 0.3 %
NRBC BLD AUTO-RTO: 0.4 %
NRBC BLD AUTO-RTO: 0.5 %
NRBC BLD AUTO-RTO: 0.6 %
PH UR STRIP.AUTO: 6 [PH]
PH UR STRIP.AUTO: 7 [PH]
PH UR STRIP.AUTO: 8 [PH]
PHOSPHATE SERPL-MCNC: 2.9 MG/DL (ref 2.3–4.7)
PHOSPHATE SERPL-MCNC: 3.2 MG/DL (ref 2.3–4.7)
PHOSPHATE SERPL-MCNC: 4.1 MG/DL (ref 2.3–4.7)
PISA TR MAX VEL: 3.34 M/S
PLATELET # BLD AUTO: 185 X10(3)/MCL (ref 130–400)
PLATELET # BLD AUTO: 189 X10(3)/MCL (ref 130–400)
PLATELET # BLD AUTO: 191 X10(3)/MCL (ref 130–400)
PLATELET # BLD AUTO: 207 X10(3)/MCL (ref 130–400)
PLATELET # BLD AUTO: 208 X10(3)/MCL (ref 140–371)
PLATELET # BLD AUTO: 219 X10(3)/MCL (ref 130–400)
PLATELET # BLD AUTO: 255 X10(3)/MCL (ref 130–400)
PLATELET # BLD AUTO: 281 X10(3)/MCL (ref 130–400)
PLATELET # BLD AUTO: 291 X10(3)/MCL (ref 130–400)
PLATELET # BLD AUTO: 299 X10(3)/MCL (ref 130–400)
PLATELET # BLD AUTO: 308 X10(3)/MCL (ref 130–400)
PLATELET # BLD AUTO: 316 X10(3)/MCL (ref 130–400)
PLATELET # BLD AUTO: 326 10*3/UL (ref 130–400)
PLATELET # BLD AUTO: 329 X10(3)/MCL (ref 130–400)
PLATELET # BLD AUTO: 426 X10(3)/MCL (ref 130–400)
PLATELET # BLD EST: NORMAL 10*3/UL
PLATELET # BLD EST: NORMAL 10*3/UL
PMV BLD AUTO: 12.4 FL (ref 9.4–12.4)
PMV BLD AUTO: 12.4 FL (ref 9.4–12.4)
PMV BLD AUTO: 12.8 FL (ref 9.4–12.4)
PMV BLD AUTO: 12.8 FL (ref 9.4–12.4)
PMV BLD AUTO: ABNORMAL FL
POIKILOCYTOSIS BLD QL SMEAR: ABNORMAL
POS ERR1 (OHS): NORMAL
POTASSIUM SERPL-SCNC: 3.1 MMOL/L (ref 3.5–5.1)
POTASSIUM SERPL-SCNC: 3.2 MMOL/L (ref 3.5–5.1)
POTASSIUM SERPL-SCNC: 3.3 MMOL/L (ref 3.5–5.1)
POTASSIUM SERPL-SCNC: 3.4 MMOL/L (ref 3.5–5.1)
POTASSIUM SERPL-SCNC: 3.5 MMOL/L (ref 3.5–5.1)
POTASSIUM SERPL-SCNC: 3.5 MMOL/L (ref 3.5–5.1)
POTASSIUM SERPL-SCNC: 3.7 MMOL/L (ref 3.5–5.1)
POTASSIUM SERPL-SCNC: 3.8 MMOL/L (ref 3.5–5.1)
POTASSIUM SERPL-SCNC: 4 MMOL/L (ref 3.5–5.1)
POTASSIUM SERPL-SCNC: 4.1 MMOL/L (ref 3.5–5.1)
POTASSIUM SERPL-SCNC: 4.4 MMOL/L (ref 3.5–5.1)
POTASSIUM SERPL-SCNC: 4.5 MMOL/L (ref 3.5–5.1)
POTASSIUM SERPL-SCNC: 4.6 MMOL/L (ref 3.5–5.1)
POTASSIUM SERPL-SCNC: 4.9 MMOL/L (ref 3.5–5.1)
POTASSIUM SERPL-SCNC: 4.9 MMOL/L (ref 3.5–5.1)
PROT SERPL-MCNC: 5.5 GM/DL (ref 5.8–7.6)
PROT SERPL-MCNC: 5.6 GM/DL (ref 5.8–7.6)
PROT SERPL-MCNC: 5.7 GM/DL (ref 5.8–7.6)
PROT SERPL-MCNC: 5.9 GM/DL (ref 5.8–7.6)
PROT SERPL-MCNC: 5.9 GM/DL (ref 5.8–7.6)
PROT SERPL-MCNC: 6 GM/DL (ref 5.8–7.6)
PROT SERPL-MCNC: 6.4 GM/DL (ref 5.8–7.6)
PROT SERPL-MCNC: 6.6 GM/DL (ref 5.8–7.6)
PROT SERPL-MCNC: 6.8 G/DL (ref 5.8–7.6)
PROT SERPL-MCNC: 6.9 GM/DL (ref 5.8–7.6)
PROT SERPL-MCNC: 7.3 GM/DL (ref 5.8–7.6)
PROT SERPL-MCNC: 7.9 GM/DL (ref 5.8–7.6)
PROT UR QL STRIP.AUTO: NEGATIVE MG/DL
PROT UR STRIP-MCNC: 27.9 MG/DL
PROT UR STRIP-MCNC: <6.8 MG/DL
PROTHROMBIN TIME: 14.1 SECONDS (ref 12.5–14.5)
PROTHROMBIN TIME: 21.4 SECONDS (ref 12.5–14.5)
PTH-INTACT SERPL-MCNC: 178.2 PG/ML (ref 8.7–77)
PV PEAK VELOCITY: 1.5 CM/S
RA PRESSURE: 15 MMHG
RBC # BLD AUTO: 3.49 10*6/UL (ref 4.2–5.4)
RBC # BLD AUTO: 4.07 X10(6)/MCL (ref 4.2–5.4)
RBC # BLD AUTO: 4.26 X10(6)/MCL (ref 4.2–5.4)
RBC # BLD AUTO: 4.38 X10(6)/MCL (ref 4.2–5.4)
RBC # BLD AUTO: 4.38 X10(6)/MCL (ref 4.2–5.4)
RBC # BLD AUTO: 4.39 X10(6)/MCL (ref 4.2–5.4)
RBC # BLD AUTO: 4.49 X10(6)/MCL (ref 4.2–5.4)
RBC # BLD AUTO: 4.63 X10(6)/MCL (ref 4.2–5.4)
RBC # BLD AUTO: 4.75 X10(6)/MCL (ref 4.2–5.4)
RBC # BLD AUTO: 4.82 X10(6)/MCL (ref 4.2–5.4)
RBC # BLD AUTO: 4.82 X10(6)/MCL (ref 4.2–5.4)
RBC # BLD AUTO: 4.85 X10(6)/MCL (ref 4.2–5.4)
RBC # BLD AUTO: 4.86 X10(6)/MCL (ref 4.2–5.4)
RBC # BLD AUTO: 4.95 X10(6)/MCL (ref 4.2–5.4)
RBC # BLD AUTO: 5.39 X10(6)/MCL (ref 4.2–5.4)
RBC #/AREA URNS AUTO: <5 /HPF
RBC #/AREA URNS AUTO: <5 /HPF
RBC #/AREA URNS AUTO: NORMAL /HPF
RBC MORPH BLD: ABNORMAL
RBC MORPH BLD: ABNORMAL
RBC UR QL AUTO: NEGATIVE UNIT/L
SODIUM SERPL-SCNC: 131 MMOL/L (ref 136–145)
SODIUM SERPL-SCNC: 135 MMOL/L (ref 136–145)
SODIUM SERPL-SCNC: 137 MMOL/L (ref 136–145)
SODIUM SERPL-SCNC: 138 MMOL/L (ref 136–145)
SODIUM SERPL-SCNC: 139 MMOL/L (ref 136–145)
SODIUM SERPL-SCNC: 139 MMOL/L (ref 136–145)
SODIUM SERPL-SCNC: 140 MMOL/L (ref 136–145)
SODIUM SERPL-SCNC: 142 MMOL/L (ref 136–145)
SODIUM SERPL-SCNC: 142 MMOL/L (ref 136–145)
SODIUM SERPL-SCNC: 143 MMOL/L (ref 136–145)
SODIUM SERPL-SCNC: 144 MMOL/L (ref 136–145)
SODIUM SERPL-SCNC: 144 MMOL/L (ref 136–145)
SODIUM SERPL-SCNC: 145 MMOL/L (ref 136–145)
SODIUM SERPL-SCNC: 146 MMOL/L (ref 136–145)
SODIUM SERPL-SCNC: 147 MMOL/L (ref 136–145)
SP GR UR STRIP.AUTO: 1.01 (ref 1–1.03)
SQUAMOUS #/AREA URNS AUTO: <5 /HPF
T4 FREE SERPL-MCNC: 1.26 NG/DL (ref 0.7–1.48)
TARGETS BLD QL SMEAR: ABNORMAL
TDI LATERAL: 0.05 M/S
TDI SEPTAL: 0.04 M/S
TDI: 0.05 M/S
TIBC SERPL-MCNC: 496 UG/DL (ref 70–310)
TIBC SERPL-MCNC: 520 UG/DL (ref 250–450)
TR MAX PG: 45 MMHG
TRANSFERRIN SERPL-MCNC: 502 MG/DL
TRICUSPID ANNULAR PLANE SYSTOLIC EXCURSION: 1.65 CM
TRIGL SERPL-MCNC: 97 MG/DL (ref 37–140)
TROPONIN I SERPL-MCNC: 0.05 NG/ML (ref 0–0.04)
TROPONIN I SERPL-MCNC: 0.05 NG/ML (ref 0–0.04)
TROPONIN I SERPL-MCNC: 0.06 NG/ML (ref 0–0.04)
TROPONIN I SERPL-MCNC: 0.07 NG/ML (ref 0–0.04)
TROPONIN I SERPL-MCNC: 0.07 NG/ML (ref 0–0.04)
TROPONIN I SERPL-MCNC: 0.09 NG/ML (ref 0–0.04)
TROPONIN I SERPL-MCNC: 0.12 NG/ML (ref 0–0.04)
TSH SERPL-ACNC: 2.2 UIU/ML (ref 0.35–4.94)
TSH SERPL-ACNC: 4.24 M[IU]/L (ref 0.35–4.94)
TSH SERPL-ACNC: 4.83 UIU/ML (ref 0.35–4.94)
TSH SERPL-ACNC: 5.34 UIU/ML (ref 0.35–4.94)
TSH SERPL-ACNC: 5.77 UIU/ML (ref 0.35–4.94)
TV REST PULMONARY ARTERY PRESSURE: 60 MMHG
URINE PROTEIN/CREATININE RATIO (OHS): 1.5
UROBILINOGEN UR STRIP-ACNC: 0.2 MG/DL
UROBILINOGEN UR STRIP-ACNC: 0.2 MG/DL
UROBILINOGEN UR STRIP-ACNC: 1 MG/DL
VLDLC SERPL CALC-MCNC: 19 MG/DL
WBC # SPEC AUTO: 11.3 X10(3)/MCL (ref 4.5–11.5)
WBC # SPEC AUTO: 4.4 X10(3)/MCL (ref 4.5–11.5)
WBC # SPEC AUTO: 5.1 X10(3)/MCL (ref 4.5–11.5)
WBC # SPEC AUTO: 5.8 X10(3)/MCL (ref 4.5–11.5)
WBC # SPEC AUTO: 6.7 X10(3)/MCL (ref 4.5–11.5)
WBC # SPEC AUTO: 6.8 X10(3)/MCL (ref 4.5–11.5)
WBC # SPEC AUTO: 6.9 X10(3)/MCL (ref 4.5–11.5)
WBC # SPEC AUTO: 7.2 X10(3)/MCL (ref 4.5–11.5)
WBC # SPEC AUTO: 7.3 10*3/UL (ref 4.5–11.5)
WBC # SPEC AUTO: 7.3 X10(3)/MCL (ref 4.5–11.5)
WBC # SPEC AUTO: 7.5 X10(3)/MCL (ref 4.5–11.5)
WBC # SPEC AUTO: 7.7 X10(3)/MCL (ref 4.5–11.5)
WBC # SPEC AUTO: 7.9 X10(3)/MCL (ref 4.5–11.5)
WBC # SPEC AUTO: 8.3 X10(3)/MCL (ref 4.5–11.5)
WBC # SPEC AUTO: 8.6 X10(3)/MCL (ref 4.5–11.5)
WBC #/AREA URNS AUTO: 7 /HPF
WBC #/AREA URNS AUTO: <5 /HPF
WBC #/AREA URNS AUTO: <5 /HPF

## 2022-01-01 PROCEDURE — 84484 ASSAY OF TROPONIN QUANT: CPT | Performed by: NURSE PRACTITIONER

## 2022-01-01 PROCEDURE — 99999 PR PBB SHADOW E&M-EST. PATIENT-LVL IV: ICD-10-PCS | Mod: PBBFAC,,, | Performed by: INTERNAL MEDICINE

## 2022-01-01 PROCEDURE — 84100 ASSAY OF PHOSPHORUS: CPT | Performed by: INTERNAL MEDICINE

## 2022-01-01 PROCEDURE — 11000001 HC ACUTE MED/SURG PRIVATE ROOM

## 2022-01-01 PROCEDURE — 99232 SBSQ HOSP IP/OBS MODERATE 35: CPT | Mod: ,,, | Performed by: INTERNAL MEDICINE

## 2022-01-01 PROCEDURE — 80061 LIPID PANEL: CPT

## 2022-01-01 PROCEDURE — 97162 PT EVAL MOD COMPLEX 30 MIN: CPT

## 2022-01-01 PROCEDURE — G0439 PR MEDICARE ANNUAL WELLNESS SUBSEQUENT VISIT: ICD-10-PCS | Mod: ,,, | Performed by: INTERNAL MEDICINE

## 2022-01-01 PROCEDURE — 99233 PR SUBSEQUENT HOSPITAL CARE,LEVL III: ICD-10-PCS | Mod: ,,, | Performed by: INTERNAL MEDICINE

## 2022-01-01 PROCEDURE — G0439 PPPS, SUBSEQ VISIT: HCPCS | Mod: ,,, | Performed by: INTERNAL MEDICINE

## 2022-01-01 PROCEDURE — 63600175 PHARM REV CODE 636 W HCPCS: Performed by: INTERNAL MEDICINE

## 2022-01-01 PROCEDURE — 25000003 PHARM REV CODE 250: Performed by: INTERNAL MEDICINE

## 2022-01-01 PROCEDURE — 99233 SBSQ HOSP IP/OBS HIGH 50: CPT | Mod: ,,, | Performed by: INTERNAL MEDICINE

## 2022-01-01 PROCEDURE — 85610 PROTHROMBIN TIME: CPT | Performed by: NURSE PRACTITIONER

## 2022-01-01 PROCEDURE — 83880 ASSAY OF NATRIURETIC PEPTIDE: CPT | Performed by: NURSE PRACTITIONER

## 2022-01-01 PROCEDURE — 99214 PR OFFICE/OUTPT VISIT, EST, LEVL IV, 30-39 MIN: ICD-10-PCS | Mod: ,,, | Performed by: INTERNAL MEDICINE

## 2022-01-01 PROCEDURE — 83540 ASSAY OF IRON: CPT

## 2022-01-01 PROCEDURE — 99213 PR OFFICE/OUTPT VISIT, EST, LEVL III, 20-29 MIN: ICD-10-PCS | Mod: 25,,, | Performed by: INTERNAL MEDICINE

## 2022-01-01 PROCEDURE — 63600175 PHARM REV CODE 636 W HCPCS: Performed by: EMERGENCY MEDICINE

## 2022-01-01 PROCEDURE — 85025 COMPLETE CBC W/AUTO DIFF WBC: CPT

## 2022-01-01 PROCEDURE — 27000221 HC OXYGEN, UP TO 24 HOURS

## 2022-01-01 PROCEDURE — 21400001 HC TELEMETRY ROOM

## 2022-01-01 PROCEDURE — 99222 1ST HOSP IP/OBS MODERATE 55: CPT | Mod: ,,,

## 2022-01-01 PROCEDURE — 25000003 PHARM REV CODE 250: Performed by: NURSE ANESTHETIST, CERTIFIED REGISTERED

## 2022-01-01 PROCEDURE — 84443 ASSAY THYROID STIM HORMONE: CPT | Performed by: PHYSICIAN ASSISTANT

## 2022-01-01 PROCEDURE — 80053 COMPREHEN METABOLIC PANEL: CPT | Performed by: INTERNAL MEDICINE

## 2022-01-01 PROCEDURE — 71046 X-RAY EXAM CHEST 2 VIEWS: CPT | Mod: TC

## 2022-01-01 PROCEDURE — 99223 PR INITIAL HOSPITAL CARE,LEVL III: ICD-10-PCS | Mod: AI,,, | Performed by: INTERNAL MEDICINE

## 2022-01-01 PROCEDURE — 99214 OFFICE O/P EST MOD 30 MIN: CPT | Mod: S$PBB,,, | Performed by: INTERNAL MEDICINE

## 2022-01-01 PROCEDURE — 99223 1ST HOSP IP/OBS HIGH 75: CPT | Mod: AI,,, | Performed by: INTERNAL MEDICINE

## 2022-01-01 PROCEDURE — 99285 EMERGENCY DEPT VISIT HI MDM: CPT | Mod: 25,27

## 2022-01-01 PROCEDURE — G0179 PR HOME HEALTH MD RECERTIFICATION: ICD-10-PCS | Mod: ,,, | Performed by: INTERNAL MEDICINE

## 2022-01-01 PROCEDURE — 63600175 PHARM REV CODE 636 W HCPCS: Mod: JG | Performed by: INTERNAL MEDICINE

## 2022-01-01 PROCEDURE — 96374 THER/PROPH/DIAG INJ IV PUSH: CPT

## 2022-01-01 PROCEDURE — 99214 PR OFFICE/OUTPT VISIT, EST, LEVL IV, 30-39 MIN: ICD-10-PCS | Mod: S$PBB,,, | Performed by: INTERNAL MEDICINE

## 2022-01-01 PROCEDURE — 93010 ELECTROCARDIOGRAM REPORT: CPT | Mod: ,,, | Performed by: INTERNAL MEDICINE

## 2022-01-01 PROCEDURE — 25000003 PHARM REV CODE 250: Performed by: NURSE PRACTITIONER

## 2022-01-01 PROCEDURE — 84484 ASSAY OF TROPONIN QUANT: CPT | Performed by: INTERNAL MEDICINE

## 2022-01-01 PROCEDURE — 63600175 PHARM REV CODE 636 W HCPCS

## 2022-01-01 PROCEDURE — 84443 ASSAY THYROID STIM HORMONE: CPT | Performed by: INTERNAL MEDICINE

## 2022-01-01 PROCEDURE — 96375 TX/PRO/DX INJ NEW DRUG ADDON: CPT

## 2022-01-01 PROCEDURE — 84439 ASSAY OF FREE THYROXINE: CPT | Performed by: PHYSICIAN ASSISTANT

## 2022-01-01 PROCEDURE — 80053 COMPREHEN METABOLIC PANEL: CPT

## 2022-01-01 PROCEDURE — 99214 OFFICE O/P EST MOD 30 MIN: CPT | Mod: ,,, | Performed by: INTERNAL MEDICINE

## 2022-01-01 PROCEDURE — 99499 NO LOS: ICD-10-PCS | Mod: ,,, | Performed by: INTERNAL MEDICINE

## 2022-01-01 PROCEDURE — 99222 PR INITIAL HOSPITAL CARE,LEVL II: ICD-10-PCS | Mod: ,,,

## 2022-01-01 PROCEDURE — 99215 OFFICE O/P EST HI 40 MIN: CPT | Mod: PBBFAC | Performed by: INTERNAL MEDICINE

## 2022-01-01 PROCEDURE — 83970 ASSAY OF PARATHORMONE: CPT | Performed by: INTERNAL MEDICINE

## 2022-01-01 PROCEDURE — 36415 COLL VENOUS BLD VENIPUNCTURE: CPT | Performed by: INTERNAL MEDICINE

## 2022-01-01 PROCEDURE — 99232 PR SUBSEQUENT HOSPITAL CARE,LEVL II: ICD-10-PCS | Mod: ,,, | Performed by: INTERNAL MEDICINE

## 2022-01-01 PROCEDURE — 85025 COMPLETE CBC W/AUTO DIFF WBC: CPT | Performed by: INTERNAL MEDICINE

## 2022-01-01 PROCEDURE — 83735 ASSAY OF MAGNESIUM: CPT | Performed by: INTERNAL MEDICINE

## 2022-01-01 PROCEDURE — 36415 COLL VENOUS BLD VENIPUNCTURE: CPT

## 2022-01-01 PROCEDURE — 99223 1ST HOSP IP/OBS HIGH 75: CPT | Mod: ,,, | Performed by: INTERNAL MEDICINE

## 2022-01-01 PROCEDURE — 99999 PR PBB SHADOW E&M-EST. PATIENT-LVL V: ICD-10-PCS | Mod: PBBFAC,,, | Performed by: INTERNAL MEDICINE

## 2022-01-01 PROCEDURE — G0180 PR HOME HEALTH MD CERTIFICATION: ICD-10-PCS | Mod: ,,, | Performed by: INTERNAL MEDICINE

## 2022-01-01 PROCEDURE — 99496 TRANSJ CARE MGMT HIGH F2F 7D: CPT | Mod: ,,, | Performed by: INTERNAL MEDICINE

## 2022-01-01 PROCEDURE — 99223 PR INITIAL HOSPITAL CARE,LEVL III: ICD-10-PCS | Mod: ,,, | Performed by: INTERNAL MEDICINE

## 2022-01-01 PROCEDURE — 99285 EMERGENCY DEPT VISIT HI MDM: CPT | Mod: 25

## 2022-01-01 PROCEDURE — 85025 COMPLETE CBC W/AUTO DIFF WBC: CPT | Performed by: NURSE PRACTITIONER

## 2022-01-01 PROCEDURE — 93010 EKG 12-LEAD: ICD-10-PCS | Mod: ,,, | Performed by: INTERNAL MEDICINE

## 2022-01-01 PROCEDURE — 83880 ASSAY OF NATRIURETIC PEPTIDE: CPT | Performed by: PHYSICIAN ASSISTANT

## 2022-01-01 PROCEDURE — 96361 HYDRATE IV INFUSION ADD-ON: CPT

## 2022-01-01 PROCEDURE — 25000003 PHARM REV CODE 250: Performed by: EMERGENCY MEDICINE

## 2022-01-01 PROCEDURE — 99496 TRANSITIONAL CARE MANAGE SERVICE 7 DAY DISCHARGE: ICD-10-PCS | Mod: ,,, | Performed by: INTERNAL MEDICINE

## 2022-01-01 PROCEDURE — 99497 ADVNCD CARE PLAN 30 MIN: CPT | Mod: ,,, | Performed by: INTERNAL MEDICINE

## 2022-01-01 PROCEDURE — 99214 OFFICE O/P EST MOD 30 MIN: CPT | Mod: ,,,

## 2022-01-01 PROCEDURE — 84156 ASSAY OF PROTEIN URINE: CPT | Performed by: INTERNAL MEDICINE

## 2022-01-01 PROCEDURE — G0179 MD RECERTIFICATION HHA PT: HCPCS | Mod: ,,, | Performed by: INTERNAL MEDICINE

## 2022-01-01 PROCEDURE — 83690 ASSAY OF LIPASE: CPT | Performed by: NURSE PRACTITIONER

## 2022-01-01 PROCEDURE — 36415 COLL VENOUS BLD VENIPUNCTURE: CPT | Performed by: NURSE PRACTITIONER

## 2022-01-01 PROCEDURE — 80053 COMPREHEN METABOLIC PANEL: CPT | Performed by: PHYSICIAN ASSISTANT

## 2022-01-01 PROCEDURE — 43248 EGD GUIDE WIRE INSERTION: CPT | Performed by: INTERNAL MEDICINE

## 2022-01-01 PROCEDURE — 99239 HOSP IP/OBS DSCHRG MGMT >30: CPT | Mod: ,,, | Performed by: INTERNAL MEDICINE

## 2022-01-01 PROCEDURE — 81001 URINALYSIS AUTO W/SCOPE: CPT | Performed by: INTERNAL MEDICINE

## 2022-01-01 PROCEDURE — 93005 ELECTROCARDIOGRAM TRACING: CPT

## 2022-01-01 PROCEDURE — 82306 VITAMIN D 25 HYDROXY: CPT

## 2022-01-01 PROCEDURE — 99239 PR HOSPITAL DISCHARGE DAY,>30 MIN: ICD-10-PCS | Mod: ,,, | Performed by: INTERNAL MEDICINE

## 2022-01-01 PROCEDURE — 84443 ASSAY THYROID STIM HORMONE: CPT

## 2022-01-01 PROCEDURE — 80048 BASIC METABOLIC PNL TOTAL CA: CPT | Performed by: INTERNAL MEDICINE

## 2022-01-01 PROCEDURE — 99999 PR PBB SHADOW E&M-EST. PATIENT-LVL IV: CPT | Mod: PBBFAC,,, | Performed by: INTERNAL MEDICINE

## 2022-01-01 PROCEDURE — 83605 ASSAY OF LACTIC ACID: CPT | Performed by: NURSE PRACTITIONER

## 2022-01-01 PROCEDURE — 85025 COMPLETE CBC W/AUTO DIFF WBC: CPT | Performed by: PHYSICIAN ASSISTANT

## 2022-01-01 PROCEDURE — 99214 OFFICE O/P EST MOD 30 MIN: CPT | Mod: PBBFAC | Performed by: INTERNAL MEDICINE

## 2022-01-01 PROCEDURE — 83880 ASSAY OF NATRIURETIC PEPTIDE: CPT

## 2022-01-01 PROCEDURE — 97530 THERAPEUTIC ACTIVITIES: CPT

## 2022-01-01 PROCEDURE — 92610 EVALUATE SWALLOWING FUNCTION: CPT

## 2022-01-01 PROCEDURE — G0180 MD CERTIFICATION HHA PATIENT: HCPCS | Mod: ,,, | Performed by: INTERNAL MEDICINE

## 2022-01-01 PROCEDURE — 99499 UNLISTED E&M SERVICE: CPT | Mod: ,,, | Performed by: INTERNAL MEDICINE

## 2022-01-01 PROCEDURE — 82270 OCCULT BLOOD FECES: CPT

## 2022-01-01 PROCEDURE — 81003 URINALYSIS AUTO W/O SCOPE: CPT | Performed by: INTERNAL MEDICINE

## 2022-01-01 PROCEDURE — 37000009 HC ANESTHESIA EA ADD 15 MINS: Performed by: INTERNAL MEDICINE

## 2022-01-01 PROCEDURE — 36415 COLL VENOUS BLD VENIPUNCTURE: CPT | Mod: ,,,

## 2022-01-01 PROCEDURE — 36415 PR COLLECTION VENOUS BLOOD,VENIPUNCTURE: ICD-10-PCS | Mod: ,,, | Performed by: INTERNAL MEDICINE

## 2022-01-01 PROCEDURE — 99204 OFFICE O/P NEW MOD 45 MIN: CPT | Mod: S$PBB,,, | Performed by: INTERNAL MEDICINE

## 2022-01-01 PROCEDURE — 83690 ASSAY OF LIPASE: CPT | Performed by: PHYSICIAN ASSISTANT

## 2022-01-01 PROCEDURE — 99204 PR OFFICE/OUTPT VISIT, NEW, LEVL IV, 45-59 MIN: ICD-10-PCS | Mod: S$PBB,,, | Performed by: INTERNAL MEDICINE

## 2022-01-01 PROCEDURE — 51702 INSERT TEMP BLADDER CATH: CPT

## 2022-01-01 PROCEDURE — 85610 PROTHROMBIN TIME: CPT | Performed by: PHYSICIAN ASSISTANT

## 2022-01-01 PROCEDURE — 82728 ASSAY OF FERRITIN: CPT

## 2022-01-01 PROCEDURE — 36415 PR COLLECTION VENOUS BLOOD,VENIPUNCTURE: ICD-10-PCS | Mod: ,,,

## 2022-01-01 PROCEDURE — 99214 OFFICE O/P EST MOD 30 MIN: CPT | Mod: PBBFAC,25

## 2022-01-01 PROCEDURE — 63600175 PHARM REV CODE 636 W HCPCS: Performed by: NURSE PRACTITIONER

## 2022-01-01 PROCEDURE — 92526 ORAL FUNCTION THERAPY: CPT

## 2022-01-01 PROCEDURE — 84484 ASSAY OF TROPONIN QUANT: CPT | Performed by: PHYSICIAN ASSISTANT

## 2022-01-01 PROCEDURE — 99214 PR OFFICE/OUTPT VISIT, EST, LEVL IV, 30-39 MIN: ICD-10-PCS | Mod: ,,,

## 2022-01-01 PROCEDURE — 36415 COLL VENOUS BLD VENIPUNCTURE: CPT | Mod: ,,, | Performed by: INTERNAL MEDICINE

## 2022-01-01 PROCEDURE — 37000008 HC ANESTHESIA 1ST 15 MINUTES: Performed by: INTERNAL MEDICINE

## 2022-01-01 PROCEDURE — 81001 URINALYSIS AUTO W/SCOPE: CPT | Performed by: NURSE PRACTITIONER

## 2022-01-01 PROCEDURE — 99213 OFFICE O/P EST LOW 20 MIN: CPT | Mod: 25,,, | Performed by: INTERNAL MEDICINE

## 2022-01-01 PROCEDURE — 51798 US URINE CAPACITY MEASURE: CPT

## 2022-01-01 PROCEDURE — 76770 US EXAM ABDO BACK WALL COMP: CPT | Mod: TC

## 2022-01-01 PROCEDURE — 99999 PR PBB SHADOW E&M-EST. PATIENT-LVL IV: ICD-10-PCS | Mod: PBBFAC,,,

## 2022-01-01 PROCEDURE — 99999 PR PBB SHADOW E&M-EST. PATIENT-LVL V: CPT | Mod: PBBFAC,,, | Performed by: INTERNAL MEDICINE

## 2022-01-01 PROCEDURE — 80053 COMPREHEN METABOLIC PANEL: CPT | Performed by: NURSE PRACTITIONER

## 2022-01-01 PROCEDURE — 99497 PR ADVNCD CARE PLAN 30 MIN: ICD-10-PCS | Mod: ,,, | Performed by: INTERNAL MEDICINE

## 2022-01-01 PROCEDURE — 92611 MOTION FLUOROSCOPY/SWALLOW: CPT

## 2022-01-01 PROCEDURE — 84466 ASSAY OF TRANSFERRIN: CPT

## 2022-01-01 PROCEDURE — 25000003 PHARM REV CODE 250: Performed by: STUDENT IN AN ORGANIZED HEALTH CARE EDUCATION/TRAINING PROGRAM

## 2022-01-01 PROCEDURE — 99214 OFFICE O/P EST MOD 30 MIN: CPT | Mod: S$PBB,,,

## 2022-01-01 PROCEDURE — 25500020 PHARM REV CODE 255: Performed by: INTERNAL MEDICINE

## 2022-01-01 PROCEDURE — 99214 PR OFFICE/OUTPT VISIT, EST, LEVL IV, 30-39 MIN: ICD-10-PCS | Mod: S$PBB,,,

## 2022-01-01 PROCEDURE — A9698 NON-RAD CONTRAST MATERIALNOC: HCPCS | Performed by: INTERNAL MEDICINE

## 2022-01-01 PROCEDURE — 99999 PR PBB SHADOW E&M-EST. PATIENT-LVL IV: CPT | Mod: PBBFAC,,,

## 2022-01-01 PROCEDURE — 36415 COLL VENOUS BLD VENIPUNCTURE: CPT | Performed by: PHYSICIAN ASSISTANT

## 2022-01-01 PROCEDURE — 97530 THERAPEUTIC ACTIVITIES: CPT | Mod: CQ

## 2022-01-01 PROCEDURE — 97116 GAIT TRAINING THERAPY: CPT | Mod: CQ

## 2022-01-01 PROCEDURE — 85730 THROMBOPLASTIN TIME PARTIAL: CPT | Performed by: PHYSICIAN ASSISTANT

## 2022-01-01 RX ORDER — RANOLAZINE 500 MG/1
500 TABLET, EXTENDED RELEASE ORAL 2 TIMES DAILY
COMMUNITY
Start: 2021-12-06 | End: 2022-01-01

## 2022-01-01 RX ORDER — PANTOPRAZOLE SODIUM 40 MG/1
40 TABLET, DELAYED RELEASE ORAL 2 TIMES DAILY
Qty: 180 TABLET | Refills: 3 | Status: SHIPPED | OUTPATIENT
Start: 2022-01-01 | End: 2022-01-01

## 2022-01-01 RX ORDER — TRAZODONE HYDROCHLORIDE 50 MG/1
50 TABLET ORAL NIGHTLY
Status: DISCONTINUED | OUTPATIENT
Start: 2022-01-01 | End: 2022-01-01 | Stop reason: HOSPADM

## 2022-01-01 RX ORDER — TALC
6 POWDER (GRAM) TOPICAL NIGHTLY PRN
Status: DISCONTINUED | OUTPATIENT
Start: 2022-01-01 | End: 2022-01-01 | Stop reason: HOSPADM

## 2022-01-01 RX ORDER — ETOMIDATE 2 MG/ML
INJECTION INTRAVENOUS
Status: DISCONTINUED | OUTPATIENT
Start: 2022-01-01 | End: 2022-01-01

## 2022-01-01 RX ORDER — MORPHINE SULFATE 4 MG/ML
4 INJECTION, SOLUTION INTRAMUSCULAR; INTRAVENOUS EVERY 4 HOURS PRN
Status: DISCONTINUED | OUTPATIENT
Start: 2022-01-01 | End: 2022-01-01 | Stop reason: HOSPADM

## 2022-01-01 RX ORDER — PROPOFOL 10 MG/ML
INJECTION, EMULSION INTRAVENOUS CONTINUOUS PRN
Status: DISCONTINUED | OUTPATIENT
Start: 2022-01-01 | End: 2022-01-01

## 2022-01-01 RX ORDER — ACETAMINOPHEN 325 MG/1
650 TABLET ORAL EVERY 8 HOURS PRN
Status: DISCONTINUED | OUTPATIENT
Start: 2022-01-01 | End: 2022-01-01 | Stop reason: HOSPADM

## 2022-01-01 RX ORDER — HYDROXYZINE HYDROCHLORIDE 25 MG/1
25 TABLET, FILM COATED ORAL NIGHTLY
Qty: 30 TABLET | Refills: 5 | Status: SHIPPED | OUTPATIENT
Start: 2022-01-01

## 2022-01-01 RX ORDER — ACETAZOLAMIDE 250 MG/1
250 TABLET ORAL ONCE
Status: COMPLETED | OUTPATIENT
Start: 2022-01-01 | End: 2022-01-01

## 2022-01-01 RX ORDER — NITROGLYCERIN 40 MG/1
PATCH TRANSDERMAL
COMMUNITY
Start: 2021-12-18 | End: 2022-01-01

## 2022-01-01 RX ORDER — MORPHINE SULFATE 4 MG/ML
4 INJECTION, SOLUTION INTRAMUSCULAR; INTRAVENOUS
Status: COMPLETED | OUTPATIENT
Start: 2022-01-01 | End: 2022-01-01

## 2022-01-01 RX ORDER — FENOFIBRATE 48 MG/1
48 TABLET, FILM COATED ORAL DAILY
Status: DISCONTINUED | OUTPATIENT
Start: 2022-01-01 | End: 2022-01-01 | Stop reason: HOSPADM

## 2022-01-01 RX ORDER — CITALOPRAM 10 MG/1
10 TABLET ORAL DAILY
COMMUNITY
Start: 2021-12-10 | End: 2022-01-01 | Stop reason: ALTCHOICE

## 2022-01-01 RX ORDER — POTASSIUM CHLORIDE 14.9 MG/ML
20 INJECTION INTRAVENOUS ONCE
Status: COMPLETED | OUTPATIENT
Start: 2022-01-01 | End: 2022-01-01

## 2022-01-01 RX ORDER — HYDROCHLOROTHIAZIDE 25 MG/1
TABLET ORAL
COMMUNITY
Start: 2021-12-09 | End: 2022-01-01

## 2022-01-01 RX ORDER — SUCRALFATE 1 G/1
1 TABLET ORAL
Qty: 28 TABLET | Refills: 0 | Status: SHIPPED | OUTPATIENT
Start: 2022-01-01 | End: 2022-01-01

## 2022-01-01 RX ORDER — SUCRALFATE 1 G/10ML
1 SUSPENSION ORAL
Status: DISCONTINUED | OUTPATIENT
Start: 2022-01-01 | End: 2022-01-01

## 2022-01-01 RX ORDER — CEPHRADINE 500 MG
200 CAPSULE ORAL
COMMUNITY
Start: 2021-05-26 | End: 2022-01-01

## 2022-01-01 RX ORDER — METOLAZONE 5 MG/1
5 TABLET ORAL DAILY
Status: DISCONTINUED | OUTPATIENT
Start: 2022-01-01 | End: 2022-01-01

## 2022-01-01 RX ORDER — TORSEMIDE 20 MG/1
20 TABLET ORAL DAILY
Status: DISCONTINUED | OUTPATIENT
Start: 2022-01-01 | End: 2022-01-01 | Stop reason: HOSPADM

## 2022-01-01 RX ORDER — SPIRONOLACTONE 25 MG/1
25 TABLET ORAL DAILY
Status: DISCONTINUED | OUTPATIENT
Start: 2022-01-01 | End: 2022-01-01 | Stop reason: HOSPADM

## 2022-01-01 RX ORDER — ONDANSETRON 2 MG/ML
4 INJECTION INTRAMUSCULAR; INTRAVENOUS DAILY PRN
Status: CANCELLED | OUTPATIENT
Start: 2022-01-01

## 2022-01-01 RX ORDER — SODIUM CHLORIDE, SODIUM LACTATE, POTASSIUM CHLORIDE, CALCIUM CHLORIDE 600; 310; 30; 20 MG/100ML; MG/100ML; MG/100ML; MG/100ML
INJECTION, SOLUTION INTRAVENOUS CONTINUOUS
Status: DISCONTINUED | OUTPATIENT
Start: 2022-01-01 | End: 2022-01-01

## 2022-01-01 RX ORDER — METOLAZONE 2.5 MG/1
2.5 TABLET ORAL DAILY
COMMUNITY
End: 2022-01-01

## 2022-01-01 RX ORDER — TORSEMIDE 20 MG/1
40 TABLET ORAL DAILY
Qty: 60 TABLET | Refills: 11 | Status: ON HOLD | OUTPATIENT
Start: 2022-01-01 | End: 2022-01-01 | Stop reason: SDUPTHER

## 2022-01-01 RX ORDER — POLYETHYLENE GLYCOL 3350 17 G/17G
17 POWDER, FOR SOLUTION ORAL DAILY
Status: DISCONTINUED | OUTPATIENT
Start: 2022-01-01 | End: 2022-01-01

## 2022-01-01 RX ORDER — ONDANSETRON 2 MG/ML
4 INJECTION INTRAMUSCULAR; INTRAVENOUS EVERY 8 HOURS PRN
Status: DISCONTINUED | OUTPATIENT
Start: 2022-01-01 | End: 2022-01-01 | Stop reason: HOSPADM

## 2022-01-01 RX ORDER — DOCUSATE SODIUM 100 MG/1
100 CAPSULE, LIQUID FILLED ORAL 2 TIMES DAILY
Status: DISCONTINUED | OUTPATIENT
Start: 2022-01-01 | End: 2022-01-01 | Stop reason: HOSPADM

## 2022-01-01 RX ORDER — AMLODIPINE BESYLATE 5 MG/1
5 TABLET ORAL DAILY
COMMUNITY
Start: 2022-01-01 | End: 2022-01-01 | Stop reason: ALTCHOICE

## 2022-01-01 RX ORDER — AMLODIPINE BESYLATE 5 MG/1
TABLET ORAL
COMMUNITY
Start: 2022-01-10 | End: 2022-01-01

## 2022-01-01 RX ORDER — MORPHINE SULFATE 4 MG/ML
2 INJECTION, SOLUTION INTRAMUSCULAR; INTRAVENOUS EVERY 4 HOURS PRN
Status: DISCONTINUED | OUTPATIENT
Start: 2022-01-01 | End: 2022-01-01 | Stop reason: HOSPADM

## 2022-01-01 RX ORDER — ACETAMINOPHEN AND CODEINE PHOSPHATE 300; 30 MG/1; MG/1
1 TABLET ORAL EVERY 6 HOURS PRN
Qty: 18 TABLET | Refills: 0 | Status: SHIPPED | OUTPATIENT
Start: 2022-01-01 | End: 2022-01-01

## 2022-01-01 RX ORDER — CLOPIDOGREL BISULFATE 75 MG/1
75 TABLET ORAL DAILY
Status: DISCONTINUED | OUTPATIENT
Start: 2022-01-01 | End: 2022-01-01

## 2022-01-01 RX ORDER — ONDANSETRON 4 MG/1
4 TABLET, FILM COATED ORAL EVERY 6 HOURS PRN
Qty: 24 TABLET | Refills: 0 | Status: SHIPPED | OUTPATIENT
Start: 2022-01-01 | End: 2023-01-01 | Stop reason: SDUPTHER

## 2022-01-01 RX ORDER — SUCRALFATE 1 G/1
1 TABLET ORAL 3 TIMES DAILY
Qty: 50 TABLET | Refills: 1 | Status: SHIPPED | OUTPATIENT
Start: 2022-01-01 | End: 2022-01-01 | Stop reason: SDUPTHER

## 2022-01-01 RX ORDER — SODIUM CHLORIDE 9 MG/ML
500 INJECTION, SOLUTION INTRAVENOUS
Status: COMPLETED | OUTPATIENT
Start: 2022-01-01 | End: 2022-01-01

## 2022-01-01 RX ORDER — CLOPIDOGREL BISULFATE 75 MG/1
75 TABLET ORAL DAILY
COMMUNITY
Start: 2021-12-10

## 2022-01-01 RX ORDER — SODIUM CHLORIDE, SODIUM GLUCONATE, SODIUM ACETATE, POTASSIUM CHLORIDE AND MAGNESIUM CHLORIDE 30; 37; 368; 526; 502 MG/100ML; MG/100ML; MG/100ML; MG/100ML; MG/100ML
INJECTION, SOLUTION INTRAVENOUS CONTINUOUS
Status: CANCELLED | OUTPATIENT
Start: 2022-01-01 | End: 2022-01-01

## 2022-01-01 RX ORDER — CLOPIDOGREL BISULFATE 75 MG/1
75 TABLET ORAL DAILY
Status: DISCONTINUED | OUTPATIENT
Start: 2022-01-01 | End: 2022-01-01 | Stop reason: HOSPADM

## 2022-01-01 RX ORDER — NITROGLYCERIN 0.4 MG/1
0.4 TABLET SUBLINGUAL EVERY 5 MIN PRN
COMMUNITY
Start: 2021-12-10

## 2022-01-01 RX ORDER — PANTOPRAZOLE SODIUM 40 MG/1
40 TABLET, DELAYED RELEASE ORAL DAILY
Status: DISCONTINUED | OUTPATIENT
Start: 2022-01-01 | End: 2022-01-01 | Stop reason: HOSPADM

## 2022-01-01 RX ORDER — POTASSIUM CHLORIDE 20 MEQ/1
20 TABLET, EXTENDED RELEASE ORAL DAILY
Qty: 30 TABLET | Refills: 2 | Status: SHIPPED | OUTPATIENT
Start: 2022-01-01 | End: 2023-01-01

## 2022-01-01 RX ORDER — FUROSEMIDE 20 MG/1
20 TABLET ORAL 2 TIMES DAILY
Status: ON HOLD | COMMUNITY
Start: 2022-01-01 | End: 2022-01-01 | Stop reason: SDUPTHER

## 2022-01-01 RX ORDER — POTASSIUM CHLORIDE 20 MEQ/1
20 TABLET, EXTENDED RELEASE ORAL DAILY
Status: DISCONTINUED | OUTPATIENT
Start: 2022-01-01 | End: 2022-01-01

## 2022-01-01 RX ORDER — PROPOFOL 10 MG/ML
VIAL (ML) INTRAVENOUS
Status: COMPLETED
Start: 2022-01-01 | End: 2022-01-01

## 2022-01-01 RX ORDER — ONDANSETRON 4 MG/1
4 TABLET, FILM COATED ORAL
Qty: 90 TABLET | Refills: 1 | Status: SHIPPED | OUTPATIENT
Start: 2022-01-01 | End: 2022-01-01

## 2022-01-01 RX ORDER — TALC
6 POWDER (GRAM) TOPICAL NIGHTLY PRN
Status: DISCONTINUED | OUTPATIENT
Start: 2022-01-01 | End: 2022-01-01

## 2022-01-01 RX ORDER — TORSEMIDE 20 MG/1
20 TABLET ORAL 2 TIMES DAILY
Status: DISCONTINUED | OUTPATIENT
Start: 2022-01-01 | End: 2022-01-01

## 2022-01-01 RX ORDER — BUMETANIDE 0.25 MG/ML
2 INJECTION INTRAMUSCULAR; INTRAVENOUS 2 TIMES DAILY
Status: DISCONTINUED | OUTPATIENT
Start: 2022-01-01 | End: 2022-01-01

## 2022-01-01 RX ORDER — DOCUSATE SODIUM 100 MG/1
100 CAPSULE, LIQUID FILLED ORAL 2 TIMES DAILY
COMMUNITY
Start: 2022-01-01

## 2022-01-01 RX ORDER — POTASSIUM CHLORIDE 20 MEQ/1
20 TABLET, EXTENDED RELEASE ORAL 2 TIMES DAILY
Status: DISCONTINUED | OUTPATIENT
Start: 2022-01-01 | End: 2022-01-01

## 2022-01-01 RX ORDER — CITALOPRAM 10 MG/1
TABLET ORAL
COMMUNITY
Start: 2021-12-02 | End: 2022-01-01

## 2022-01-01 RX ORDER — TORSEMIDE 20 MG/1
20 TABLET ORAL DAILY
Qty: 30 TABLET | Refills: 11
Start: 2022-01-01 | End: 2023-12-13

## 2022-01-01 RX ORDER — POTASSIUM CHLORIDE 1500 MG/1
20 TABLET, EXTENDED RELEASE ORAL DAILY
COMMUNITY
Start: 2022-01-01 | End: 2022-01-01

## 2022-01-01 RX ORDER — ETOMIDATE 2 MG/ML
INJECTION INTRAVENOUS
Status: COMPLETED
Start: 2022-01-01 | End: 2022-01-01

## 2022-01-01 RX ORDER — FENOFIBRATE 134 MG/1
134 CAPSULE ORAL DAILY
COMMUNITY
Start: 2022-01-01

## 2022-01-01 RX ORDER — ACETAZOLAMIDE 250 MG/1
250 TABLET ORAL 3 TIMES DAILY
Status: DISCONTINUED | OUTPATIENT
Start: 2022-01-01 | End: 2022-01-01

## 2022-01-01 RX ORDER — MAG HYDROX/ALUMINUM HYD/SIMETH 200-200-20
30 SUSPENSION, ORAL (FINAL DOSE FORM) ORAL
Status: DISCONTINUED | OUTPATIENT
Start: 2022-01-01 | End: 2022-01-01

## 2022-01-01 RX ORDER — ONDANSETRON 2 MG/ML
4 INJECTION INTRAMUSCULAR; INTRAVENOUS
Status: COMPLETED | OUTPATIENT
Start: 2022-01-01 | End: 2022-01-01

## 2022-01-01 RX ORDER — FUROSEMIDE 10 MG/ML
60 INJECTION INTRAMUSCULAR; INTRAVENOUS
Status: COMPLETED | OUTPATIENT
Start: 2022-01-01 | End: 2022-01-01

## 2022-01-01 RX ORDER — LEVOTHYROXINE SODIUM 50 UG/1
50 TABLET ORAL
Status: DISCONTINUED | OUTPATIENT
Start: 2022-01-01 | End: 2022-01-01 | Stop reason: HOSPADM

## 2022-01-01 RX ORDER — METRONIDAZOLE 500 MG/1
500 TABLET ORAL 3 TIMES DAILY
Qty: 10 TABLET | Refills: 0 | Status: SHIPPED | OUTPATIENT
Start: 2022-01-01 | End: 2022-01-01

## 2022-01-01 RX ORDER — CHOLECALCIFEROL (VITAMIN D3) 125 MCG
5000 CAPSULE ORAL DAILY
COMMUNITY
Start: 2021-05-26

## 2022-01-01 RX ORDER — TORSEMIDE 20 MG/1
20 TABLET ORAL DAILY
Status: DISCONTINUED | OUTPATIENT
Start: 2022-01-01 | End: 2022-01-01

## 2022-01-01 RX ORDER — ROSUVASTATIN CALCIUM 40 MG/1
40 TABLET, COATED ORAL DAILY
COMMUNITY
Start: 2021-12-22

## 2022-01-01 RX ORDER — RIVAROXABAN 2.5 MG/1
2.5 TABLET, FILM COATED ORAL 2 TIMES DAILY
COMMUNITY
Start: 2021-12-12

## 2022-01-01 RX ORDER — RANOLAZINE 500 MG/1
500 TABLET, EXTENDED RELEASE ORAL 2 TIMES DAILY
COMMUNITY
Start: 2022-01-01

## 2022-01-01 RX ORDER — MORPHINE SULFATE 4 MG/ML
4 INJECTION, SOLUTION INTRAMUSCULAR; INTRAVENOUS EVERY 4 HOURS PRN
Status: DISCONTINUED | OUTPATIENT
Start: 2022-01-01 | End: 2022-01-01

## 2022-01-01 RX ORDER — LEVOTHYROXINE SODIUM 50 UG/1
50 TABLET ORAL
Qty: 30 TABLET | Refills: 11 | Status: SHIPPED | OUTPATIENT
Start: 2022-01-01 | End: 2023-09-06

## 2022-01-01 RX ORDER — ATORVASTATIN CALCIUM 10 MG/1
20 TABLET, FILM COATED ORAL DAILY
Status: DISCONTINUED | OUTPATIENT
Start: 2022-01-01 | End: 2022-01-01 | Stop reason: HOSPADM

## 2022-01-01 RX ORDER — SPIRONOLACTONE 25 MG/1
25 TABLET ORAL DAILY
Qty: 30 TABLET | Refills: 11 | Status: SHIPPED | OUTPATIENT
Start: 2022-01-01 | End: 2023-01-01

## 2022-01-01 RX ORDER — TAMSULOSIN HYDROCHLORIDE 0.4 MG/1
0.4 CAPSULE ORAL DAILY
Qty: 14 CAPSULE | Refills: 0 | Status: ON HOLD | OUTPATIENT
Start: 2022-01-01 | End: 2023-01-01 | Stop reason: CLARIF

## 2022-01-01 RX ORDER — SUCRALFATE 1 G/1
1 TABLET ORAL
Status: DISCONTINUED | OUTPATIENT
Start: 2022-01-01 | End: 2022-01-01 | Stop reason: HOSPADM

## 2022-01-01 RX ORDER — METOLAZONE 2.5 MG/1
5 TABLET ORAL DAILY
Qty: 180 TABLET | Refills: 3 | Status: SHIPPED | OUTPATIENT
Start: 2022-01-01 | End: 2022-01-01

## 2022-01-01 RX ORDER — FUROSEMIDE 20 MG/1
20 TABLET ORAL DAILY
Qty: 90 TABLET | Refills: 3 | Status: SHIPPED | OUTPATIENT
Start: 2022-01-01 | End: 2022-01-01

## 2022-01-01 RX ORDER — PANTOPRAZOLE SODIUM 40 MG/1
40 TABLET, DELAYED RELEASE ORAL DAILY
Qty: 30 TABLET | Refills: 3 | Status: SHIPPED | OUTPATIENT
Start: 2022-01-01 | End: 2022-01-01 | Stop reason: SDUPTHER

## 2022-01-01 RX ORDER — ATORVASTATIN CALCIUM 10 MG/1
20 TABLET, FILM COATED ORAL NIGHTLY
Status: DISCONTINUED | OUTPATIENT
Start: 2022-01-01 | End: 2022-01-01 | Stop reason: HOSPADM

## 2022-01-01 RX ORDER — SUCRALFATE 1 G/1
1 TABLET ORAL 3 TIMES DAILY
Qty: 270 TABLET | Refills: 3 | Status: ON HOLD | OUTPATIENT
Start: 2022-01-01 | End: 2022-01-01 | Stop reason: HOSPADM

## 2022-01-01 RX ORDER — RANOLAZINE 500 MG/1
500 TABLET, EXTENDED RELEASE ORAL 2 TIMES DAILY
Status: DISCONTINUED | OUTPATIENT
Start: 2022-01-01 | End: 2022-01-01 | Stop reason: HOSPADM

## 2022-01-01 RX ORDER — SODIUM CHLORIDE, SODIUM LACTATE, POTASSIUM CHLORIDE, CALCIUM CHLORIDE 600; 310; 30; 20 MG/100ML; MG/100ML; MG/100ML; MG/100ML
INJECTION, SOLUTION INTRAVENOUS CONTINUOUS
Status: DISCONTINUED | OUTPATIENT
Start: 2022-01-01 | End: 2022-01-01 | Stop reason: HOSPADM

## 2022-01-01 RX ORDER — OLMESARTAN MEDOXOMIL 20 MG/1
TABLET ORAL
COMMUNITY
Start: 2021-12-09 | End: 2022-01-01

## 2022-01-01 RX ORDER — SODIUM CHLORIDE 9 MG/ML
INJECTION, SOLUTION INTRAVENOUS CONTINUOUS
Status: DISCONTINUED | OUTPATIENT
Start: 2022-01-01 | End: 2022-01-01 | Stop reason: HOSPADM

## 2022-01-01 RX ORDER — FUROSEMIDE 20 MG/1
20 TABLET ORAL DAILY
Qty: 30 TABLET | Refills: 0 | Status: SHIPPED | OUTPATIENT
Start: 2022-01-01 | End: 2022-01-01 | Stop reason: SDUPTHER

## 2022-01-01 RX ORDER — FUROSEMIDE 10 MG/ML
80 INJECTION INTRAMUSCULAR; INTRAVENOUS
Status: DISCONTINUED | OUTPATIENT
Start: 2022-01-01 | End: 2022-01-01

## 2022-01-01 RX ORDER — ONDANSETRON 4 MG/1
4 TABLET, FILM COATED ORAL
COMMUNITY
Start: 2022-01-04 | End: 2022-01-01 | Stop reason: SDUPTHER

## 2022-01-01 RX ORDER — SUCRALFATE 1 G/1
1 TABLET ORAL
Status: DISCONTINUED | OUTPATIENT
Start: 2022-01-01 | End: 2022-01-01

## 2022-01-01 RX ORDER — ACETAZOLAMIDE 250 MG/1
250 TABLET ORAL 2 TIMES DAILY
Status: DISCONTINUED | OUTPATIENT
Start: 2022-01-01 | End: 2022-01-01

## 2022-01-01 RX ORDER — POTASSIUM CHLORIDE 20 MEQ/1
20 TABLET, EXTENDED RELEASE ORAL DAILY
Status: DISCONTINUED | OUTPATIENT
Start: 2022-01-01 | End: 2022-01-01 | Stop reason: HOSPADM

## 2022-01-01 RX ORDER — HYDROXYZINE HYDROCHLORIDE 25 MG/1
25 TABLET, FILM COATED ORAL NIGHTLY
Status: DISCONTINUED | OUTPATIENT
Start: 2022-01-01 | End: 2022-01-01 | Stop reason: HOSPADM

## 2022-01-01 RX ORDER — LIDOCAINE HCL/PF 100 MG/5ML
SYRINGE (ML) INTRAVENOUS
Status: DISCONTINUED | OUTPATIENT
Start: 2022-01-01 | End: 2022-01-01

## 2022-01-01 RX ORDER — SODIUM CHLORIDE, SODIUM GLUCONATE, SODIUM ACETATE, POTASSIUM CHLORIDE AND MAGNESIUM CHLORIDE 30; 37; 368; 526; 502 MG/100ML; MG/100ML; MG/100ML; MG/100ML; MG/100ML
INJECTION, SOLUTION INTRAVENOUS CONTINUOUS
Status: DISCONTINUED | OUTPATIENT
Start: 2022-01-01 | End: 2022-01-01 | Stop reason: HOSPADM

## 2022-01-01 RX ORDER — TORSEMIDE 20 MG/1
40 TABLET ORAL 2 TIMES DAILY
Status: DISCONTINUED | OUTPATIENT
Start: 2022-01-01 | End: 2022-01-01

## 2022-01-01 RX ORDER — METOLAZONE 2.5 MG/1
5 TABLET ORAL DAILY
Qty: 60 TABLET | Refills: 11 | Status: SHIPPED | OUTPATIENT
Start: 2022-01-01 | End: 2022-01-01 | Stop reason: SDUPTHER

## 2022-01-01 RX ADMIN — RANOLAZINE 500 MG: 500 TABLET, EXTENDED RELEASE ORAL at 10:06

## 2022-01-01 RX ADMIN — ATORVASTATIN CALCIUM 20 MG: 10 TABLET, FILM COATED ORAL at 08:12

## 2022-01-01 RX ADMIN — POTASSIUM CHLORIDE 20 MEQ: 1500 TABLET, EXTENDED RELEASE ORAL at 12:12

## 2022-01-01 RX ADMIN — TORSEMIDE 20 MG: 20 TABLET ORAL at 11:12

## 2022-01-01 RX ADMIN — POLYETHYLENE GLYCOL 3350 17 G: 17 POWDER, FOR SOLUTION ORAL at 09:12

## 2022-01-01 RX ADMIN — TRAZODONE HYDROCHLORIDE 50 MG: 50 TABLET ORAL at 08:12

## 2022-01-01 RX ADMIN — POTASSIUM CHLORIDE 20 MEQ: 1500 TABLET, EXTENDED RELEASE ORAL at 09:12

## 2022-01-01 RX ADMIN — DOCUSATE SODIUM 100 MG: 100 CAPSULE, LIQUID FILLED ORAL at 08:06

## 2022-01-01 RX ADMIN — RIVAROXABAN 2.5 MG: 2.5 TABLET, FILM COATED ORAL at 06:06

## 2022-01-01 RX ADMIN — MORPHINE SULFATE 4 MG: 4 INJECTION INTRAVENOUS at 05:12

## 2022-01-01 RX ADMIN — EPOETIN ALFA-EPBX 20000 UNITS: 20000 INJECTION, SOLUTION INTRAVENOUS; SUBCUTANEOUS at 11:12

## 2022-01-01 RX ADMIN — SUCRALFATE 1 G: 1 TABLET ORAL at 11:06

## 2022-01-01 RX ADMIN — POTASSIUM CHLORIDE 20 MEQ: 14.9 INJECTION, SOLUTION INTRAVENOUS at 08:12

## 2022-01-01 RX ADMIN — POLYETHYLENE GLYCOL 3350 17 G: 17 POWDER, FOR SOLUTION ORAL at 08:12

## 2022-01-01 RX ADMIN — FERRIC CARBOXYMALTOSE INJECTION 750 MG: 50 INJECTION, SOLUTION INTRAVENOUS at 02:12

## 2022-01-01 RX ADMIN — ONDANSETRON 4 MG: 2 INJECTION INTRAMUSCULAR; INTRAVENOUS at 05:12

## 2022-01-01 RX ADMIN — ETOMIDATE 5 MG: 2 INJECTION INTRAVENOUS at 11:11

## 2022-01-01 RX ADMIN — FENOFIBRATE 48 MG: 48 TABLET ORAL at 08:06

## 2022-01-01 RX ADMIN — TORSEMIDE 40 MG: 20 TABLET ORAL at 08:12

## 2022-01-01 RX ADMIN — DOCUSATE SODIUM 100 MG: 100 CAPSULE, LIQUID FILLED ORAL at 09:12

## 2022-01-01 RX ADMIN — ACETAZOLAMIDE 250 MG: 250 TABLET ORAL at 10:12

## 2022-01-01 RX ADMIN — HYDROXYZINE HYDROCHLORIDE 25 MG: 25 TABLET, FILM COATED ORAL at 10:12

## 2022-01-01 RX ADMIN — METOLAZONE 5 MG: 5 TABLET ORAL at 09:12

## 2022-01-01 RX ADMIN — DOCUSATE SODIUM 100 MG: 100 CAPSULE, LIQUID FILLED ORAL at 08:12

## 2022-01-01 RX ADMIN — MORPHINE SULFATE 4 MG: 4 INJECTION INTRAVENOUS at 11:09

## 2022-01-01 RX ADMIN — HYDROXYZINE HYDROCHLORIDE 25 MG: 25 TABLET, FILM COATED ORAL at 08:12

## 2022-01-01 RX ADMIN — MELATONIN TAB 3 MG 6 MG: 3 TAB at 09:06

## 2022-01-01 RX ADMIN — SUCRALFATE 1 G: 1 TABLET ORAL at 06:06

## 2022-01-01 RX ADMIN — ACETAZOLAMIDE SODIUM 250 MG: 500 INJECTION, POWDER, LYOPHILIZED, FOR SOLUTION INTRAVENOUS at 09:12

## 2022-01-01 RX ADMIN — RIVAROXABAN 2.5 MG: 2.5 TABLET, FILM COATED ORAL at 08:12

## 2022-01-01 RX ADMIN — BARIUM SULFATE 10 ML: 0.81 POWDER, FOR SUSPENSION ORAL at 11:12

## 2022-01-01 RX ADMIN — RANOLAZINE 500 MG: 500 TABLET, EXTENDED RELEASE ORAL at 09:06

## 2022-01-01 RX ADMIN — CLOPIDOGREL BISULFATE 75 MG: 75 TABLET ORAL at 09:12

## 2022-01-01 RX ADMIN — ONDANSETRON 4 MG: 2 INJECTION INTRAMUSCULAR; INTRAVENOUS at 11:09

## 2022-01-01 RX ADMIN — SODIUM CHLORIDE: 9 INJECTION, SOLUTION INTRAVENOUS at 07:06

## 2022-01-01 RX ADMIN — RIVAROXABAN 2.5 MG: 2.5 TABLET, FILM COATED ORAL at 09:12

## 2022-01-01 RX ADMIN — LIDOCAINE HYDROCHLORIDE 80 MG: 20 INJECTION, SOLUTION INTRAVENOUS at 11:11

## 2022-01-01 RX ADMIN — ACETAZOLAMIDE 250 MG: 250 TABLET ORAL at 08:12

## 2022-01-01 RX ADMIN — LEVOTHYROXINE SODIUM 50 MCG: 50 TABLET ORAL at 05:12

## 2022-01-01 RX ADMIN — SODIUM CHLORIDE: 9 INJECTION, SOLUTION INTRAVENOUS at 03:06

## 2022-01-01 RX ADMIN — FUROSEMIDE 80 MG: 10 INJECTION, SOLUTION INTRAMUSCULAR; INTRAVENOUS at 12:12

## 2022-01-01 RX ADMIN — FENOFIBRATE 48 MG: 48 TABLET ORAL at 09:06

## 2022-01-01 RX ADMIN — POTASSIUM CHLORIDE 20 MEQ: 14.9 INJECTION, SOLUTION INTRAVENOUS at 12:12

## 2022-01-01 RX ADMIN — TRAZODONE HYDROCHLORIDE 50 MG: 50 TABLET ORAL at 09:12

## 2022-01-01 RX ADMIN — TORSEMIDE 20 MG: 20 TABLET ORAL at 09:12

## 2022-01-01 RX ADMIN — CLOPIDOGREL 75 MG: 75 TABLET, FILM COATED ORAL at 08:06

## 2022-01-01 RX ADMIN — CLOPIDOGREL BISULFATE 75 MG: 75 TABLET ORAL at 08:12

## 2022-01-01 RX ADMIN — POTASSIUM BICARBONATE 40 MEQ: 391 TABLET, EFFERVESCENT ORAL at 02:09

## 2022-01-01 RX ADMIN — SPIRONOLACTONE 25 MG: 25 TABLET ORAL at 12:12

## 2022-01-01 RX ADMIN — HYDROXYZINE HYDROCHLORIDE 25 MG: 25 TABLET, FILM COATED ORAL at 09:12

## 2022-01-01 RX ADMIN — SODIUM CHLORIDE 500 ML: 9 INJECTION, SOLUTION INTRAVENOUS at 11:09

## 2022-01-01 RX ADMIN — ATORVASTATIN CALCIUM 20 MG: 10 TABLET, FILM COATED ORAL at 10:06

## 2022-01-01 RX ADMIN — LEVOTHYROXINE SODIUM 50 MCG: 50 TABLET ORAL at 04:12

## 2022-01-01 RX ADMIN — ATORVASTATIN CALCIUM 20 MG: 10 TABLET, FILM COATED ORAL at 09:06

## 2022-01-01 RX ADMIN — SUCRALFATE 1 G: 1 TABLET ORAL at 10:06

## 2022-01-01 RX ADMIN — ACETAZOLAMIDE 250 MG: 250 TABLET ORAL at 12:12

## 2022-01-01 RX ADMIN — ONDANSETRON 4 MG: 2 INJECTION INTRAMUSCULAR; INTRAVENOUS at 01:06

## 2022-01-01 RX ADMIN — SODIUM CHLORIDE 500 ML: 9 INJECTION, SOLUTION INTRAVENOUS at 01:06

## 2022-01-01 RX ADMIN — CLOPIDOGREL BISULFATE 75 MG: 75 TABLET ORAL at 04:12

## 2022-01-01 RX ADMIN — SUCRALFATE 1 G: 1 TABLET ORAL at 04:06

## 2022-01-01 RX ADMIN — POTASSIUM CHLORIDE 20 MEQ: 1500 TABLET, EXTENDED RELEASE ORAL at 08:12

## 2022-01-01 RX ADMIN — RIVAROXABAN 2.5 MG: 2.5 TABLET, FILM COATED ORAL at 08:06

## 2022-01-01 RX ADMIN — SPIRONOLACTONE 25 MG: 25 TABLET ORAL at 08:12

## 2022-01-01 RX ADMIN — TRAZODONE HYDROCHLORIDE 50 MG: 50 TABLET ORAL at 10:12

## 2022-01-01 RX ADMIN — ACETAZOLAMIDE 250 MG: 250 TABLET ORAL at 09:12

## 2022-01-01 RX ADMIN — LEVOTHYROXINE SODIUM 50 MCG: 50 TABLET ORAL at 06:12

## 2022-01-01 RX ADMIN — TORSEMIDE 20 MG: 20 TABLET ORAL at 04:12

## 2022-01-01 RX ADMIN — BUMETANIDE 2 MG: 0.25 INJECTION INTRAMUSCULAR; INTRAVENOUS at 12:12

## 2022-01-01 RX ADMIN — CLOPIDOGREL 75 MG: 75 TABLET, FILM COATED ORAL at 09:06

## 2022-01-01 RX ADMIN — DOCUSATE SODIUM 100 MG: 100 CAPSULE, LIQUID FILLED ORAL at 09:06

## 2022-01-01 RX ADMIN — POTASSIUM CHLORIDE 20 MEQ: 1500 TABLET, EXTENDED RELEASE ORAL at 10:12

## 2022-01-01 RX ADMIN — SUCRALFATE 1 G: 1 TABLET ORAL at 09:06

## 2022-01-01 RX ADMIN — TORSEMIDE 40 MG: 20 TABLET ORAL at 04:12

## 2022-01-01 RX ADMIN — PANTOPRAZOLE SODIUM 40 MG: 40 TABLET, DELAYED RELEASE ORAL at 08:06

## 2022-01-01 RX ADMIN — METOLAZONE 5 MG: 5 TABLET ORAL at 08:12

## 2022-01-01 RX ADMIN — RIVAROXABAN 2.5 MG: 2.5 TABLET, FILM COATED ORAL at 10:12

## 2022-01-01 RX ADMIN — SUCRALFATE 1 G: 1 TABLET ORAL at 05:06

## 2022-01-01 RX ADMIN — PROPOFOL 100 MCG/KG/MIN: 10 INJECTION, EMULSION INTRAVENOUS at 11:11

## 2022-01-01 RX ADMIN — ATORVASTATIN CALCIUM 20 MG: 10 TABLET, FILM COATED ORAL at 04:12

## 2022-01-01 RX ADMIN — RANOLAZINE 500 MG: 500 TABLET, EXTENDED RELEASE ORAL at 08:06

## 2022-01-01 RX ADMIN — BUMETANIDE 2 MG: 0.25 INJECTION INTRAMUSCULAR; INTRAVENOUS at 09:12

## 2022-01-01 RX ADMIN — ACETAZOLAMIDE 250 MG: 250 TABLET ORAL at 11:12

## 2022-01-01 RX ADMIN — MORPHINE SULFATE 4 MG: 4 INJECTION INTRAVENOUS at 01:06

## 2022-01-01 RX ADMIN — FUROSEMIDE 60 MG: 10 INJECTION, SOLUTION INTRAMUSCULAR; INTRAVENOUS at 06:12

## 2022-01-01 RX ADMIN — RIVAROXABAN 2.5 MG: 2.5 TABLET, FILM COATED ORAL at 10:06

## 2022-01-01 RX ADMIN — DOCUSATE SODIUM 100 MG: 100 CAPSULE, LIQUID FILLED ORAL at 10:06

## 2022-01-01 RX ADMIN — METOLAZONE 5 MG: 5 TABLET ORAL at 12:12

## 2022-01-01 RX ADMIN — RIVAROXABAN 2.5 MG: 2.5 TABLET, FILM COATED ORAL at 09:06

## 2022-01-01 RX ADMIN — ATORVASTATIN CALCIUM 20 MG: 10 TABLET, FILM COATED ORAL at 09:12

## 2022-01-01 RX ADMIN — PANTOPRAZOLE SODIUM 40 MG: 40 TABLET, DELAYED RELEASE ORAL at 09:06

## 2022-01-01 RX ADMIN — ACETAZOLAMIDE 250 MG: 250 TABLET ORAL at 03:12

## 2022-01-14 ENCOUNTER — HISTORICAL (OUTPATIENT)
Dept: RADIOLOGY | Facility: HOSPITAL | Age: 87
End: 2022-01-14

## 2022-01-14 LAB
BMD RECOMMENDATION EXT: NORMAL
BMD RECOMMENDATION EXT: NORMAL

## 2022-04-30 NOTE — OP NOTE
DATE OF SURGERY:    10/13/2017    SURGEON:  Antony Payton DO    PREOPERATIVE DIAGNOSES:    1. Bilateral extremity discomfort.  2. Lower extremity edema.  3. Chronic venous insufficiency, CEAP classification 3, 4.  4. Chronic venous hypertension.  5. Chronic venous intermittent claudication.    6. Coronary artery disease.  7. Hypertension.  8. Valvular heart disease.    POSTOPERATIVE DIAGNOSIS:  Bilateral iliac vein compression.    PROCEDURES:    1. Bilateral iliac vein venography.   2. Percutaneous transluminal venoplasty and stenting of the bilateral iliac veins.    3. Intravascular ultrasonography of the bilateral iliac veins.    COMPLICATIONS:  None.    ESTIMATED BLOOD LOSS:  None.    CONTRAST UTILIZED:  Approximately 100 cc.    HEPARIN DOSE:  4000 units.    HISTORY:  The patient was brought to the cardiac catheterization laboratory for the above-outlined procedures, which were performed after informed consent was obtained using a sterile modified Seldinger technique and access of the bilateral superficial femoral veins.  Using ultrasound guidance, we were able to access the bilateral iliac veins.  We then positioned #8 Somali sheaths within the bilateral iliac veins.  We then performed simultaneous venograms of the bilateral iliac veins, and there was a suggestion of significant bilateral iliac vein compression.  Using the Volcano ultrasound device, we then performed intravascular ultrasonography of the bilateral iliac veins and confirmed severe bilateral common and external iliac vein compression extending into the distal portion of the inferior vena cava.  After noting this, we then upsized to a 10-Somali sheath in the bilateral femoral veins and simultaneously deployed two 22 x 70 mm wall stents in the bilateral iliac veins extending into the distal IVC.  The stents were then post dilated with 16 x 60 mm balloons, and resultant venogram showed excellent stent apposition and widely patent bilateral  iliac vein stents.  There was some mild residual spasm of the right external iliac vein for which we performed balloon angioplasty with the 16 x 60 mm balloon, which the patient tolerated well, and resultant angiogram showed continued patency.  The patient left the cardiac catheterization laboratory in stable condition.        ______________________________  Antony Payton DO    CT/UD  DD:  10/13/2017  Time:  06:44PM  DT:  10/14/2017  Time:  11:24AM  Job #:  [id number]    cc: Antony Payton DO

## 2022-04-30 NOTE — DISCHARGE SUMMARY
DISCHARGE DATE:  10/13/2017    HOSPITAL COURSE:  Ms. Haji was admitted and underwent elective bilateral lower extremity venography and simultaneous bilateral iliac vein stenting of, as outlined in the operative report.  This is in light of her presenting symptoms and findings noted on noninvasive cardiovascular evaluation.  Postoperative course has been unremarkable so far, as she is being prepared for discharge home later this evening.    DISCHARGE DIAGNOSES:    1. Bilateral iliac vein compression.  2. Status post successful percutaneous transluminal vein grafting and stenting of the bilateral iliac veins.  3. Venous symptoms of claudication in the bilateral lower extremities.  4. Chronic venous insufficiency, CEAP classification of 3-4.  5. Hypertension.  6. Coronary artery disease.  7. Valvular heart disease.  8. Renal insufficiency.    RECOMMENDATIONS:  The patient will be arranged for discharge home today to continue on the same medications as on admission and I have asked her to call or return should she have any further problems or complaints.  She will continue on the same medications as on admission, which includes Plavix daily and aspirin on Mondays, Wednesdays and Fridays and she will also be on Eliquis, 5 mg twice daily.  She was also given a prescription for Medrol Dose-Yao and Motrin for any discomfort related to the recent stent placement and also a refill of Protonix for GI prophylaxis.  Further recommendations forthcoming.        ______________________________  Antony Payton DO    CT/AILYN  DD:  10/13/2017  Time:  06:47PM  DT:  10/16/2017  Time:  10:12AM  Job #:  [id number]    cc: Antony Payton DO

## 2022-05-03 PROBLEM — I48.91 A-FIB: Status: ACTIVE | Noted: 2022-01-01

## 2022-05-03 PROBLEM — K21.9 GASTROESOPHAGEAL REFLUX DISEASE: Status: ACTIVE | Noted: 2022-01-01

## 2022-05-03 PROBLEM — I10 HYPERTENSION: Status: ACTIVE | Noted: 2022-01-01

## 2022-05-03 PROBLEM — E78.5 HYPERLIPIDEMIA: Status: ACTIVE | Noted: 2022-01-01

## 2022-05-03 PROBLEM — N18.31 STAGE 3A CHRONIC KIDNEY DISEASE: Status: ACTIVE | Noted: 2022-01-01

## 2022-05-03 PROBLEM — I73.9 PERIPHERAL VASCULAR DISEASE, UNSPECIFIED: Status: ACTIVE | Noted: 2022-01-01

## 2022-05-03 PROBLEM — I25.118 ATHEROSCLEROTIC HEART DISEASE OF NATIVE CORONARY ARTERY WITH OTHER FORMS OF ANGINA PECTORIS: Status: ACTIVE | Noted: 2022-01-01

## 2022-05-03 PROBLEM — I63.9 CEREBROVASCULAR ACCIDENT (CVA): Status: ACTIVE | Noted: 2022-01-01

## 2022-05-03 NOTE — HISTORICAL [facility] CERNER
This is a historical note converted from Callie. Formatting and pictures may have been removed.  Please reference Callie for original formatting and attached multimedia. Chief Complaint  PT. IN TODAY FOR LEFT KNEE ALVAREZ CYST THAT COMES AND GOES FOR 2 MONTHS. PT. REPORTS THAT HER LEG GETS WEAK. PT. IS TAKING IBUPROFEN FOR THE PAIN WHEN NEEDED...SB  History of Present Illness  This is a 84-year-old female who comes in with an off-and-on history of left knee pain. ?States majority of her pain is behind the knee.? She is been dealing with this off and on for the last 2 months.? She is taken ibuprofen?for the pain. ?She also reports some?pain in the calf area certain times.  Review of Systems  GENERAL: No fevers, chills, unexpected weight loss or gain  MUSCULOSKELETAL: Joint pain, extremity pain  Physical Exam  Vitals & Measurements  HR:?72(Peripheral)? BP:?123/80?  HT:?154?cm? HT:?154?cm? WT:?54.43?kg? WT:?54.43?kg? BMI:?22.95?  General: Well-developed, well-nourished.  Neuro: Alert and oriented x 3.  Psych: Normal mood and affect.  CV: Palpable radial pulses.  Resp: Smooth and unlabored.  Skin: No evidence of focal lesions or trauma.  Hem/Imm/Lymph: No evidence of lymphangitis or adenopathy.  Gait: No trendelenburg gait.  DTR: 2+, no hypo or hyperreflexia.  Coordination and Balance: No tremors or abnormal station.  Knee Exam:  No obvious deformity. ?Fullness in the posterior aspect of the knee. ?Range of motion from 0-130 degrees. Negative patella grind and equal subluxation of knee cap medial and lateral < 1cm. Negative patella tendon tenderness. Negative Lachman and anterior drawer test. Negative posterior drawer test. Negative varus and valgus stress test. Negative medial joint line tenderness. Negative lateral joint line tenderness. 5/5 strength and normal skin appearance. Sensibility normal.  Assessment/Plan  1.?Bakers cyst of knee?M71.20  ? I am concerned she has a Bakers cyst in the knee. ?I want to order an MRI  to get and idea of the size?of the cyst.? I explained her that her treatment will be based on the location of the cysts and the size.  Ordered:  MRI Ext Lower Joint Left W/O Contrast, Routine, 01/17/19 11:20:00 CST, Pain in joint lower leg, To rule out a Bakers cyst, None, Ambulatory, Rad Type, Order for future visit, Bakers cyst of knee, Schedule this test, [facility], 01/17/19 11:20:00 CST  Office/Outpatient Visit Level 3 [facility] [id number] [facility], Bakers cyst of knee, [facility], 01/17/19 11:20:00 CST  ?   Problem List/Past Medical History  Ongoing  CAD - Coronary artery disease  CVA - Cerebrovascular accident  GERD - Gastro-esophageal reflux disease  HTN - Hypertension  Hyperlipidemia  TIA - Transient ischaemic attack  Historical  No qualifying data  Procedure/Surgical History  avr/mvr  hunter  left lower lobectomy  lhc x2  other foot surgery  rt bunionectomy  rt cts   Medications  amlodipine 5 mg oral tablet, 5 mg= 1 tab(s), Oral, Daily  aspirin 81 mg oral tablet, 81 mg= 1 tab(s), Oral, M,W,F, 3 refills  atorvastatin 80 mg oral tablet, 80 mg= 1 tab(s), Oral, Daily  atorvastatin 80 mg oral tablet, 80 mg= 1 tab(s), Oral, Daily  Centrum Silver oral tablet, 1 tab(s), Oral, Daily  clopidogrel 75 mg oral tablet, 75 mg= 1 tab(s), Oral, Daily  DICLOFENAC TAB 50MG DR, 50 mg= 1 tab(s), Oral, Daily  fenofibrate 134 mg oral capsule, 134 mg= 1 cap(s), Oral, Daily  magnesium oxide 250 mg oral tablet, 500 mg= 2 tab(s), Oral, Daily  metformin 500 mg oral tablet, 500 mg, Oral, Daily  Omega-3 oral capsule, 1 tabs`, Oral, Daily  Pantoprazole 40 mg ORAL EC-Tablet, 40 mg= 1 tab(s), Oral, Daily  Template Non-Formulary Med, 1 tab(s), Oral, Daily  Trilipix 135 mg oral delayed release capsule, 135 mg= 1 cap(s), Oral, Daily  valsartan 320 mg oral tablet, 320 mg= 1 tab(s), Oral, Daily  Zetia 10 mg tablet, 10 mg= 1 tab(s), Oral, Daily  Allergies  No Known Allergies  Social History  Alcohol - Denies Alcohol Use,  09/19/2015  Current, Wine, 01/17/2019  Substance Abuse - Denies Substance Abuse, 09/19/2015  Tobacco - Denies Tobacco Use, 09/19/2015  Former smoker, quit more than 30 days ago, N/A, 01/17/2019  Health Maintenance  Health Maintenance  ???Pending?(in the next year)  ??? ??OverDue  ??? ? ? ?Coronary Artery Disease Maintenance-BMP due??10/13/18??and every 1??year(s)  ??? ? ? ?Advance Directive due??10/13/18??and every 1??year(s)  ??? ? ? ?Coronary Artery Disease Maintenance-Electrocardiogram due??10/13/18??and every 1??year(s)  ??? ??Due?  ??? ? ? ?ADL Screening due??01/17/19??and every 1??year(s)  ??? ? ? ?Bone Density Screening due??01/17/19??Variable frequency  ??? ? ? ?Cognitive Screening due??01/17/19??and every 1??year(s)  ??? ? ? ?Fall Risk Assessment due??01/17/19??and every 1??year(s)  ??? ? ? ?Functional Assessment due??01/17/19??and every 1??year(s)  ??? ? ? ?Geriatric Depression Screening due??01/17/19??and every 1??year(s)  ??? ? ? ?Hypertension Management-Education due??01/17/19??and every 1??year(s)  ??? ? ? ?Pneumococcal Vaccine due??01/17/19??Variable frequency  ??? ? ? ?Smoking Cessation due??01/17/19??Variable frequency  ??? ? ? ?Tetanus Vaccine due??01/17/19??and every 10??year(s)  ??? ? ? ?Zoster Vaccine due??01/17/19??and every 100??year(s)  ???Satisfied?(in the past 1 year)  ??? ??Satisfied?  ??? ? ? ?Blood Pressure Screening on??01/17/19.??Satisfied by Sara Cantrell  ??? ? ? ?Body Mass Index Check on??01/17/19.??Satisfied by Sara Cantrell.  ??? ? ? ?Depression Screening on??01/17/19.??Satisfied by Sara Cantrell.  ??? ? ? ?Hypertension Management-Blood Pressure on??01/17/19.??Satisfied by Sara Cantrell  ??? ? ? ?Obesity Screening on??01/17/19.??Satisfied by Sara Cantrell  ?  ?  Diagnostic Results  X-rays of her left knee show minimal degenerative changes.

## 2022-05-03 NOTE — PROGRESS NOTES
Subjective:      Patient ID: Dee Haji is a 87 y.o. female.    Chief Complaint: Follow-up (4 month ), Abdominal Pain, Nausea, and Fatigue    Ms. Price is an 87-year-old female with HTN, HLD, CVA, GERD, A. fib, CAD on anticoagulation that is here today for her follow-up visitShe was given trial of hydroxyzine for some difficulty sleeping. We stressed importance of notifying office for any worsening shortness of breath or chest pain.      MCW: 2021  BMD: 2022    The patient was seen and examined.  Overall seems to be doing well except generally feels unwell and has some epigastric abdominal pain  Also has fatigue and we discussed getting a TSH checked to make sure as well as a CBC to rule out any medical causes of fatigue.    The patient's Health Maintenance was reviewed and the following appears to be due at this time:   Health Maintenance Due   Topic Date Due    TETANUS VACCINE  Never done    Shingles Vaccine (2 of 2) 03/15/2019        Past Medical History:  Past Medical History:   Diagnosis Date    Dyspnea on exertion     GERD (gastroesophageal reflux disease)     HTN (hypertension)     Synovial cyst of knee     TIA (transient ischemic attack)     Unspecified hemorrhoids     Unspecified osteoarthritis, unspecified site      Past Surgical History:   Procedure Laterality Date    ANGIOGRAM, CORONARY, WITH LEFT HEART CATHETERIZATION      BUNIONECTOMY Right     CATARACT EXTRACTION Right     CHOLECYSTECTOMY      CORONARY STENT PLACEMENT      left lower lobectomy Left      Review of patient's allergies indicates:  No Known Allergies  Social History     Socioeconomic History    Marital status: Single   Tobacco Use    Smoking status: Former Smoker     Types: Cigarettes     Quit date: 1/3/1972     Years since quittin.3    Smokeless tobacco: Never Used   Substance and Sexual Activity    Alcohol use: Not Currently     Family History   Problem Relation Age of Onset    Heart failure  Mother     Cancer Father     Diabetes Sister     Diabetes Brother        Review of Systems   Constitutional: Negative for activity change, appetite change and fatigue.   HENT: Negative for postnasal drip, rhinorrhea, sinus pain and sore throat.    Eyes: Negative for visual disturbance.   Respiratory: Negative for cough, shortness of breath and wheezing.    Cardiovascular: Negative for chest pain and palpitations.   Gastrointestinal: Negative for abdominal distention, blood in stool, constipation, diarrhea, nausea and vomiting.   Genitourinary: Negative for dysuria, flank pain, frequency and hematuria.   Musculoskeletal: Negative for arthralgias, back pain and joint swelling.   Skin: Negative for rash and wound.   Neurological: Negative for dizziness, seizures, weakness and headaches.   Psychiatric/Behavioral: Negative for agitation and suicidal ideas.       Objective:   /70 (BP Location: Left arm, Patient Position: Sitting, BP Method: Small (Manual))   Pulse 97   Temp 98.4 °F (36.9 °C)   Ht 5' (1.524 m)   Wt 47 kg (103 lb 9.6 oz)   SpO2 97%   BMI 20.23 kg/m²     Physical Exam  Constitutional:       Appearance: Normal appearance.   HENT:      Head: Normocephalic and atraumatic.      Nose: Nose normal.      Mouth/Throat:      Mouth: Mucous membranes are moist.      Pharynx: Oropharynx is clear.   Eyes:      Extraocular Movements: Extraocular movements intact.      Pupils: Pupils are equal, round, and reactive to light.   Cardiovascular:      Rate and Rhythm: Normal rate and regular rhythm.      Pulses: Normal pulses.   Pulmonary:      Effort: Pulmonary effort is normal.      Breath sounds: Normal breath sounds.   Abdominal:      General: Bowel sounds are normal.      Palpations: Abdomen is soft.   Musculoskeletal:         General: Normal range of motion.      Cervical back: Normal range of motion and neck supple.   Skin:     General: Skin is warm.   Neurological:      General: No focal deficit present.       Mental Status: She is alert and oriented to person, place, and time. Mental status is at baseline.   Psychiatric:         Mood and Affect: Mood normal.       Assessment:     1. Atherosclerotic heart disease of native coronary artery with other forms of angina pectoris    2. Peripheral vascular disease, unspecified    3. Stage 3a chronic kidney disease    4. Paroxysmal atrial fibrillation    5. Hyperlipidemia, unspecified hyperlipidemia type    6. Hypertension, unspecified type    7. Gastroesophageal reflux disease, unspecified whether esophagitis present      Plan:     Problem List Items Addressed This Visit        Cardiac/Vascular    Atherosclerotic heart disease of native coronary artery with other forms of angina pectoris - Primary    Relevant Medications    ranolazine (RANEXA) 500 MG Tb12    rivaroxaban (XARELTO) 2.5 mg Tab    rosuvastatin (CRESTOR) 40 MG Tab    Other Relevant Orders    CBC Auto Differential    TSH    Peripheral vascular disease, unspecified    Relevant Medications    clopidogreL (PLAVIX) 75 mg tablet    A-fib    Relevant Medications    rivaroxaban (XARELTO) 2.5 mg Tab    Hyperlipidemia    Relevant Medications    fenofibrate micronized (LOFIBRA) 134 MG Cap    rosuvastatin (CRESTOR) 40 MG Tab    Hypertension    Relevant Medications    amLODIPine (NORVASC) 5 MG tablet    finerenone 10 mg Tab    hydroCHLOROthiazide (HYDRODIURIL) 25 MG tablet    nitroGLYCERIN 0.2 mg/hr TD PT24 (NITRODUR) 0.2 mg/hr    olmesartan (BENICAR) 20 MG tablet    Other Relevant Orders    CBC Auto Differential    TSH       Renal/    Stage 3a chronic kidney disease    Relevant Medications    finerenone 10 mg Tab       GI    Gastroesophageal reflux disease    Relevant Orders    CBC Auto Differential        Follow up in about 3 months (around 8/3/2022) for CARMENCarleen Price was seen today for follow-up, abdominal pain, nausea and fatigue.    Diagnoses and all orders for this visit:    Atherosclerotic heart disease of native  coronary artery with other forms of angina pectoris  -     CBC Auto Differential; Future  -     TSH; Future    Peripheral vascular disease, unspecified    Stage 3a chronic kidney disease    Paroxysmal atrial fibrillation    Hyperlipidemia, unspecified hyperlipidemia type    Hypertension, unspecified type  -     CBC Auto Differential; Future  -     TSH; Future    Gastroesophageal reflux disease, unspecified whether esophagitis present  -     CBC Auto Differential; Future    Other orders  -     pantoprazole (PROTONIX) 40 MG tablet; Take 1 tablet (40 mg total) by mouth once daily.  -     sucralfate (CARAFATE) 1 gram tablet; Take 1 tablet (1 g total) by mouth 4 (four) times daily before meals and nightly.      Checking patient's TSH and CBC today,  Follow-up on results.      Follow-up on outpatient as abdominal pain up with the Carafate and PPI    Orders Placed This Encounter   Procedures    CBC Auto Differential     Standing Status:   Future     Standing Expiration Date:   7/2/2023    TSH     Standing Status:   Future     Standing Expiration Date:   5/3/2023       Medication List with Changes/Refills   New Medications    PANTOPRAZOLE (PROTONIX) 40 MG TABLET    Take 1 tablet (40 mg total) by mouth once daily.    SUCRALFATE (CARAFATE) 1 GRAM TABLET    Take 1 tablet (1 g total) by mouth 4 (four) times daily before meals and nightly.   Current Medications    ALPHA LIPOIC ACID 200 MG CAP    Take 200 mg by mouth.    AMLODIPINE (NORVASC) 5 MG TABLET        CITALOPRAM (CELEXA) 10 MG TABLET        CLOPIDOGREL (PLAVIX) 75 MG TABLET    Take 75 mg by mouth once daily.    DOCUSATE SODIUM (COLACE) 100 MG CAPSULE    Take 100 mg by mouth 2 (two) times daily.    FENOFIBRATE MICRONIZED (LOFIBRA) 134 MG CAP    Take 134 mg by mouth once daily at 6am.    FINERENONE 10 MG TAB    Take 10 mg by mouth.    FUROSEMIDE (LASIX) 20 MG TABLET    Take 20 mg by mouth 2 (two) times a day.    GINKGO BILOBA 120 MG TAB      0 Refill(s)     HYDROCHLOROTHIAZIDE (HYDRODIURIL) 25 MG TABLET        NITROGLYCERIN 0.2 MG/HR TD PT24 (NITRODUR) 0.2 MG/HR        OLMESARTAN (BENICAR) 20 MG TABLET        ONDANSETRON (ZOFRAN) 4 MG TABLET    Take 4 mg by mouth.    POTASSIUM CHLORIDE (K-TAB) 20 MEQ    Take 20 mEq by mouth once daily.    RANOLAZINE (RANEXA) 500 MG TB12    500 mg 2 (two) times daily.    RIVAROXABAN (XARELTO) 2.5 MG TAB    Take 2.5 mg by mouth 2 (two) times daily.    ROSUVASTATIN (CRESTOR) 40 MG TAB    Take 40 mg by mouth once daily.      Medication List with Changes/Refills   New Medications    PANTOPRAZOLE (PROTONIX) 40 MG TABLET    Take 1 tablet (40 mg total) by mouth once daily.       Start Date: 5/3/2022  End Date: 5/3/2023    SUCRALFATE (CARAFATE) 1 GRAM TABLET    Take 1 tablet (1 g total) by mouth 4 (four) times daily before meals and nightly.       Start Date: 5/3/2022  End Date: --   Current Medications    ALPHA LIPOIC ACID 200 MG CAP    Take 200 mg by mouth.       Start Date: 5/26/2021 End Date: --    AMLODIPINE (NORVASC) 5 MG TABLET           Start Date: 1/10/2022 End Date: --    CITALOPRAM (CELEXA) 10 MG TABLET           Start Date: 12/2/2021 End Date: --    CLOPIDOGREL (PLAVIX) 75 MG TABLET    Take 75 mg by mouth once daily.       Start Date: 12/10/2021End Date: --    DOCUSATE SODIUM (COLACE) 100 MG CAPSULE    Take 100 mg by mouth 2 (two) times daily.       Start Date: 4/26/2022 End Date: --    FENOFIBRATE MICRONIZED (LOFIBRA) 134 MG CAP    Take 134 mg by mouth once daily at 6am.       Start Date: 2/20/2022 End Date: --    FINERENONE 10 MG TAB    Take 10 mg by mouth.       Start Date: 12/10/2021End Date: --    FUROSEMIDE (LASIX) 20 MG TABLET    Take 20 mg by mouth 2 (two) times a day.       Start Date: 3/22/2022 End Date: --    GINKGO BILOBA 120 MG TAB      0 Refill(s)       Start Date: 5/26/2021 End Date: --    HYDROCHLOROTHIAZIDE (HYDRODIURIL) 25 MG TABLET           Start Date: 12/9/2021 End Date: --    NITROGLYCERIN 0.2 MG/HR TD PT24  (NITRODUR) 0.2 MG/HR           Start Date: 12/18/2021End Date: --    OLMESARTAN (BENICAR) 20 MG TABLET           Start Date: 12/9/2021 End Date: --    ONDANSETRON (ZOFRAN) 4 MG TABLET    Take 4 mg by mouth.       Start Date: 1/4/2022  End Date: --    POTASSIUM CHLORIDE (K-TAB) 20 MEQ    Take 20 mEq by mouth once daily.       Start Date: 4/12/2022 End Date: --    RANOLAZINE (RANEXA) 500 MG TB12    500 mg 2 (two) times daily.       Start Date: 12/6/2021 End Date: --    RIVAROXABAN (XARELTO) 2.5 MG TAB    Take 2.5 mg by mouth 2 (two) times daily.       Start Date: 12/12/2021End Date: --    ROSUVASTATIN (CRESTOR) 40 MG TAB    Take 40 mg by mouth once daily.       Start Date: 12/22/2021End Date: --

## 2022-05-04 PROBLEM — G45.9 TRANSIENT ISCHEMIC ATTACK: Status: ACTIVE | Noted: 2022-01-01

## 2022-05-04 PROBLEM — Z95.2 S/P AVR (AORTIC VALVE REPLACEMENT): Status: ACTIVE | Noted: 2022-01-01

## 2022-05-04 PROBLEM — I25.10 ARTERIOSCLEROSIS OF CORONARY ARTERY: Status: ACTIVE | Noted: 2022-01-01

## 2022-05-04 PROBLEM — R06.09 DYSPNEA ON EXERTION: Status: ACTIVE | Noted: 2022-01-01

## 2022-05-23 NOTE — TELEPHONE ENCOUNTER
Please confirm that patient had blood drawn?  I cannot tell if she completed that are not.  It looks like there is labs ordered on May 13th.

## 2022-05-23 NOTE — TELEPHONE ENCOUNTER
I spoke with pt she stated she did in office labs, from dr thurman notes he ordered a cbc and tsh not bnp, I will call the lab department to fax over results.

## 2022-05-25 NOTE — TELEPHONE ENCOUNTER
Noted thank you.    ----- Message from Naga Wilson MA sent at 5/23/2022  4:08 PM CDT -----  Regarding: labs  Spoke w/lab she explained pt specimen is improperly preserved lab spoke w/ivy about this, pt have to be redrawn.

## 2022-06-13 PROBLEM — I38 VALVULAR HEART DISEASE: Status: ACTIVE | Noted: 2022-01-01

## 2022-06-13 NOTE — ASSESSMENT & PLAN NOTE
-established with Cardiology  -s/p MVR  -prominent aortic stenosis auscultated during physical exam

## 2022-06-13 NOTE — PROGRESS NOTES
Subjective:       Patient ID: Dee Haji is a 87 y.o. female.    Chief Complaint: Fatigue and severe weakness (PPT STATES SAW CARDIO WITH C/O WEAKNESS  DX WITH LONG TERM COVID   ALSO HAS NO TASTE SINCE COVID IN JAN)    Ms. Price is an 87-year-old female here today for requested visit regarding and not feeling well.  Medical comorbidities include HTN, HLD, CVA, GERD, A. fib, CAD on anticoagulation and establish with Cardiology.  Today reports she has been having some ongoing dyspnea on exertion, fatigue, and feelings of weakness.  Of note patient noted to be currently hypotensive 88/50 on multiple diuretics and antihypertensive.  Reports previous echo to be 42%, for which we will request records.  [facility] notes have BNP level in the 18,000 range, for which repeat today.  Also s/p mitral valve prolapse with prominent aortic stenosis heard during auscultation.  Lengthy discussion had with patient/wife regarding likely relation to her heart conditions.  Encouraged to present to ER for any worsening of symptoms otherwise will await labs and re-evaluate.  No other acute medical concerns noted.    MCW: 8/31/2021  BMD: January 2022       Fatigue  This is a recurrent problem. The current episode started more than 1 month ago. The problem occurs daily. The problem has been waxing and waning. Associated symptoms include fatigue and weakness. Pertinent negatives include no chest pain or coughing. The symptoms are aggravated by exertion and walking. She has tried rest for the symptoms. The treatment provided mild relief.     Review of Systems   Constitutional: Positive for fatigue. Negative for unexpected weight change.   Respiratory: Positive for shortness of breath (With exertion). Negative for cough, chest tightness and wheezing.    Cardiovascular: Negative for chest pain, palpitations and leg swelling.   Neurological: Positive for weakness.         Objective:      Physical Exam  Constitutional:       General: She is  not in acute distress.     Appearance: Normal appearance.   HENT:      Right Ear: Tympanic membrane, ear canal and external ear normal.      Left Ear: Tympanic membrane, ear canal and external ear normal.      Nose: Nose normal.      Mouth/Throat:      Mouth: Mucous membranes are moist.      Pharynx: Oropharynx is clear.   Eyes:      Extraocular Movements: Extraocular movements intact.      Conjunctiva/sclera: Conjunctivae normal.      Pupils: Pupils are equal, round, and reactive to light.   Cardiovascular:      Rate and Rhythm: Normal rate. Rhythm irregular.      Pulses: Normal pulses.      Heart sounds: Murmur heard.    Systolic murmur is present.    No gallop.   Pulmonary:      Effort: Pulmonary effort is normal.      Breath sounds: Normal breath sounds. No wheezing.   Abdominal:      General: Bowel sounds are normal. There is no distension.      Palpations: Abdomen is soft. There is no mass.      Tenderness: There is no abdominal tenderness. There is no guarding.   Musculoskeletal:         General: Normal range of motion.      Right lower leg: No edema.      Left lower leg: No edema.   Skin:     General: Skin is warm and dry.   Neurological:      Mental Status: She is alert. Mental status is at baseline.      Sensory: No sensory deficit.      Motor: No weakness.         Assessment:       Problem List Items Addressed This Visit        Cardiac/Vascular    Arteriosclerosis of coronary artery     -source cardiology  -currently on Ranexa, statin, ARB, and Xarelto           Peripheral vascular disease, unspecified     -currently on Plavix           Atrial fibrillation    Hypertension     -hypotensive during visit 88/50  -on multiple diuretics and antihypertensives  -check BNP           Dyspnea on exertion - Primary     -established with cardiology  -previous echo reported to be 42%, request records  -previous BNP 1,800; noted to not be on 3 diuretics, repeat BMP today  -in hypotensive 88/50, possibly related to  multiple diuretics and antihypertensives  -HX Afib, s/p MVP, audible aortic stenosis   -lungs clear hold off on chest x-ray           Relevant Orders    BNP    Vitamin D    TSH    CBC Auto Differential    Valvular heart disease     -established with Cardiology  -s/p MVR  -prominent aortic stenosis auscultated during physical exam             Other Visit Diagnoses     Fatigue, unspecified type        Relevant Orders    BNP    Vitamin D    TSH    CBC Auto Differential    Vitamin D deficiency        Relevant Orders    Vitamin D    Abnormal laboratory test        Relevant Orders    BNP    Vitamin D    TSH    CBC Auto Differential    Comprehensive Metabolic Panel          Plan:   Follow up for Keep previously scheduled appointment.  -encouraged to follow-up with cardiology  -request records from cardiology  -obtain labs to rule out other reasons  -encouraged to present to ER for any worsening of symptoms    Orders Placed This Encounter    BNP    Vitamin D    TSH    CBC Auto Differential    Comprehensive Metabolic Panel    CBC with Differential       I have reviewed the notes, assessments, and/or procedures performed by Presley Simon NP, I concur with her/his documentation of Dee Haji.     I personally examined the patient  General:  Alert and oriented, no apparent distress  Cardiovascular:  S1-S2, no murmur, rub, gallop blowing systolic murmur  Abdomen:  Soft  Neuro:  Nonfocal  Respiratory:  Clear to auscultation bilaterally , no wheeze, rhonchi or crackles    Labs were ordered, patient had an elevated BNP at last visit  Blood pressure is noted to be in the 80s  Patient already noted to be on Zaroxolyn, Lasix and hydrochlorothiazide  Considering her low blood pressure, would hold off on increasing her diuretics unless patient's BNP is out of range.  Usually stays at and 1000  Clinically she does not show any signs of volume overload  May need adjusting of her diuretics for her to start feeling better  since it might be the reason for her feeling poorly.    Rest of the assessment and Plan is reviewed and noted above and agreed with it

## 2022-06-13 NOTE — ASSESSMENT & PLAN NOTE
-established with cardiology  -previous echo reported to be 42%, request records  -previous BNP 1,800; noted to be on 3 diuretics, repeat BMP today  -hypotensive 88/50, possibly related to multiple diuretics and antihypertensives  -HX Afib, s/p MVP, audible aortic stenosis   -lungs clear hold off on chest x-ray

## 2022-06-19 PROBLEM — N17.9 AKI (ACUTE KIDNEY INJURY): Status: ACTIVE | Noted: 2022-01-01

## 2022-06-19 PROBLEM — R10.9 ABDOMINAL PAIN: Status: ACTIVE | Noted: 2022-01-01

## 2022-06-19 PROBLEM — R07.9 CHEST PAIN: Status: ACTIVE | Noted: 2022-01-01

## 2022-06-19 NOTE — HPI
Mr. Haji is an 86 yo female with a h/o atrial fibrillation, hypertension, long Covid, GERD, mixed Hyperlipidemia, CAD and CVA who came in with abdominal pain in the epigastric region for the past week along with nausea and vomiting.  The pain is constant and otherwise difficult for her to characterize.  She denies fevers or chills.  She denies diarrhea.  Her last bowel movement was yesterday.  There is no hematochezia or melena.  She has noticed diminished urine output.  Dr. Antony Payton added metolazone to her regimen about 2 weeks ago.  She is being admitted for further evaluation and management of her abdominal pain, nausea, vomiting and acute kidney injury.

## 2022-06-19 NOTE — ASSESSMENT & PLAN NOTE
Patient with acute kidney injury likely d/t IVVD/Dehydration Which is currently worsening. Labs reviewed- Renal function/electrolytes with CrCl cannot be calculated (Unknown ideal weight.). according to latest data. Monitor urine output and serial BMP and adjust therapy as needed. Avoid nephrotoxins and renally dose meds for GFR listed above.   Hold all diuretics, gentle diuresis.

## 2022-06-19 NOTE — H&P
[facility]  Emergency [facility]  [facility] Medicine  History & Physical    Patient Name: Dee Haji  MRN: [id number]  Patient Class: IP- Inpatient  Admission Date: 6/19/2022  Attending Physician: Tomasz Marin MD   Primary Care Provider: Tierra Johnson MD         Patient information was obtained from patient and ER records.     Subjective:     Principal Problem:RENÉ (acute kidney injury)    Chief Complaint:   Chief Complaint   Patient presents with    Abdominal Pain     Upper abd pain, weakness,  n/v x 3 days, hx of afib        HPI: Mr. Haji is an 86 yo female with a h/o atrial fibrillation, hypertension, long Covid, GERD, mixed Hyperlipidemia, CAD and CVA who came in with abdominal pain in the epigastric region for the past week along with nausea and vomiting.  The pain is constant and otherwise difficult for her to characterize.  She denies fevers or chills.  She denies diarrhea.  Her last bowel movement was yesterday.  There is no hematochezia or melena.  She has noticed diminished urine output.  Dr. Antony Payton added metolazone to her regimen about 2 weeks ago.  She is being admitted for further evaluation and management of her abdominal pain, nausea, vomiting and acute kidney injury.      Past Medical History:   Diagnosis Date    Dyspnea on exertion     GERD (gastroesophageal reflux disease)     HTN (hypertension)     Synovial cyst of knee     TIA (transient ischemic attack)     Unspecified hemorrhoids     Unspecified osteoarthritis, unspecified site        Past Surgical History:   Procedure Laterality Date    ANGIOGRAM, CORONARY, WITH LEFT HEART CATHETERIZATION      BUNIONECTOMY Right     CATARACT EXTRACTION Right     CHOLECYSTECTOMY      CORONARY STENT PLACEMENT      left lower lobectomy Left        Review of patient's allergies indicates:  No Known Allergies    No current facility-administered medications on file prior to encounter.     Current Outpatient  Medications on File Prior to Encounter   Medication Sig    alpha lipoic acid 200 mg Cap Take 200 mg by mouth.    amLODIPine (NORVASC) 5 MG tablet     clopidogreL (PLAVIX) 75 mg tablet Take 75 mg by mouth once daily.    docusate sodium (COLACE) 100 MG capsule Take 100 mg by mouth 2 (two) times daily.    fenofibrate micronized (LOFIBRA) 134 MG Cap Take 134 mg by mouth once daily at 6am.    finerenone 10 mg Tab Take 10 mg by mouth.    furosemide (LASIX) 20 MG tablet Take 20 mg by mouth 2 (two) times a day.    ginkgo biloba 120 mg Tab   0 Refill(s)    ondansetron (ZOFRAN) 4 MG tablet Take 4 mg by mouth.    pantoprazole (PROTONIX) 40 MG tablet Take 1 tablet (40 mg total) by mouth once daily.    potassium chloride (K-TAB) 20 mEq Take 20 mEq by mouth once daily.    ranolazine (RANEXA) 500 MG Tb12 500 mg 2 (two) times daily.    rivaroxaban (XARELTO) 2.5 mg Tab Take 2.5 mg by mouth 2 (two) times daily.    rosuvastatin (CRESTOR) 40 MG Tab Take 40 mg by mouth once daily.    sucralfate (CARAFATE) 1 gram tablet Take 1 tablet (1 g total) by mouth 4 (four) times daily before meals and nightly.    [DISCONTINUED] citalopram (CELEXA) 10 MG tablet     [DISCONTINUED] hydroCHLOROthiazide (HYDRODIURIL) 25 MG tablet     [DISCONTINUED] metOLazone (ZAROXOLYN) 2.5 MG tablet Take 2.5 mg by mouth once daily. Only takes MON, WED AND FRI    [DISCONTINUED] nitroGLYCERIN 0.2 mg/hr TD PT24 (NITRODUR) 0.2 mg/hr     [DISCONTINUED] olmesartan (BENICAR) 20 MG tablet      Family History       Problem Relation (Age of Onset)    Cancer Father    Diabetes Sister, Brother    Heart failure Mother          Tobacco Use    Smoking status: Former Smoker     Types: Cigarettes     Quit date: 1/3/1972     Years since quittin.4    Smokeless tobacco: Never Used   Substance and Sexual Activity    Alcohol use: Not Currently    Drug use: Not on file    Sexual activity: Not on file     Review of Systems   Constitutional:  Positive for  appetite change. Negative for chills and fever.   HENT:  Positive for trouble swallowing.    Eyes: Negative.    Respiratory: Negative.     Cardiovascular:  Positive for chest pain (related to the abd pain).   Gastrointestinal:  Positive for abdominal pain, nausea and vomiting. Negative for blood in stool, constipation and diarrhea.   Genitourinary: Negative.    Musculoskeletal: Negative.    Neurological:  Positive for headaches (sinus headache).   Psychiatric/Behavioral: Negative.     Objective:     Vital Signs (Most Recent):  Temp: 99 °F (37.2 °C) (06/19/22 1246)  Pulse: 110 (06/19/22 1325)  Resp: 16 (06/19/22 1358)  BP: 121/78 (06/19/22 1325)  SpO2: 97 % (06/19/22 1325) Vital Signs (24h Range):  Temp:  [99 °F (37.2 °C)] 99 °F (37.2 °C)  Pulse:  [] 110  Resp:  [16-21] 16  SpO2:  [97 %-98 %] 97 %  BP: ()/(68-78) 121/78        There is no height or weight on file to calculate BMI.    Physical Exam  Constitutional:       General: She is not in acute distress.     Appearance: Normal appearance. She is not ill-appearing or toxic-appearing.   HENT:      Head: Normocephalic and atraumatic.      Mouth/Throat:      Mouth: Mucous membranes are dry.      Pharynx: Oropharynx is clear.   Eyes:      General: No scleral icterus.     Conjunctiva/sclera: Conjunctivae normal.   Neck:      Vascular: No carotid bruit.   Cardiovascular:      Rate and Rhythm: Normal rate. Rhythm irregular.      Pulses: Normal pulses.      Heart sounds: Murmur (III/VI hsm) heard.     No friction rub. No gallop.      Comments: Bounding chest    Pulmonary:      Effort: Pulmonary effort is normal.      Breath sounds: Normal breath sounds.   Abdominal:      General: Abdomen is flat. Bowel sounds are normal.      Palpations: Abdomen is soft. There is no mass.      Tenderness: There is no abdominal tenderness. There is no guarding or rebound.   Musculoskeletal:         General: No swelling or deformity.      Cervical back: No rigidity or  tenderness.      Right lower leg: No edema.      Left lower leg: No edema.   Lymphadenopathy:      Cervical: No cervical adenopathy.   Skin:     General: Skin is warm and dry.      Coloration: Skin is not jaundiced or pale.      Findings: No erythema.   Neurological:      General: No focal deficit present.      Mental Status: She is alert and oriented to person, place, and time.      Gait: Gait normal.   Psychiatric:         Mood and Affect: Mood normal.         Behavior: Behavior normal.         Thought Content: Thought content normal.         Judgment: Judgment normal.           Significant Labs: All pertinent labs within the past 24 hours have been reviewed.    Significant Imaging: I have reviewed all pertinent imaging results/findings within the past 24 hours.    Assessment/Plan:     * RENÉ (acute kidney injury)  Patient with acute kidney injury likely d/t IVVD/Dehydration Which is currently worsening. Labs reviewed- Renal function/electrolytes with CrCl cannot be calculated (Unknown ideal weight.). according to latest data. Monitor urine output and serial BMP and adjust therapy as needed. Avoid nephrotoxins and renally dose meds for GFR listed above.   Hold all diuretics, gentle diuresis.    Abdominal pain  She has protonix and carafate ordered.  Will check a stool for H Pylori, clear liquids diet and monitor.        Chest pain  Troponin mildly elevated.  Will trend the enzymes.      Hypertension  Cont home meds and monitor.      Gastroesophageal reflux disease  Cont protonix.      Atrial fibrillation  Cont home meds.      Arteriosclerosis of coronary artery  Cont home meds.      VTE Risk Mitigation (From admission, onward)         Ordered     Place SUGEY hose  Until discontinued         06/19/22 3955                   Radha Xiong MD  Department of Hospital Medicine   [facility]  Emergency Dept

## 2022-06-19 NOTE — ASSESSMENT & PLAN NOTE
She has protonix and carafate ordered.  Will check a stool for H Pylori, clear liquids diet and monitor.

## 2022-06-19 NOTE — SUBJECTIVE & OBJECTIVE
Past Medical History:   Diagnosis Date    Dyspnea on exertion     GERD (gastroesophageal reflux disease)     HTN (hypertension)     Synovial cyst of knee     TIA (transient ischemic attack)     Unspecified hemorrhoids     Unspecified osteoarthritis, unspecified site        Past Surgical History:   Procedure Laterality Date    ANGIOGRAM, CORONARY, WITH LEFT HEART CATHETERIZATION      BUNIONECTOMY Right     CATARACT EXTRACTION Right     CHOLECYSTECTOMY      CORONARY STENT PLACEMENT      left lower lobectomy Left        Review of patient's allergies indicates:  No Known Allergies    No current facility-administered medications on file prior to encounter.     Current Outpatient Medications on File Prior to Encounter   Medication Sig    alpha lipoic acid 200 mg Cap Take 200 mg by mouth.    amLODIPine (NORVASC) 5 MG tablet     clopidogreL (PLAVIX) 75 mg tablet Take 75 mg by mouth once daily.    docusate sodium (COLACE) 100 MG capsule Take 100 mg by mouth 2 (two) times daily.    fenofibrate micronized (LOFIBRA) 134 MG Cap Take 134 mg by mouth once daily at 6am.    finerenone 10 mg Tab Take 10 mg by mouth.    furosemide (LASIX) 20 MG tablet Take 20 mg by mouth 2 (two) times a day.    ginkgo biloba 120 mg Tab   0 Refill(s)    ondansetron (ZOFRAN) 4 MG tablet Take 4 mg by mouth.    pantoprazole (PROTONIX) 40 MG tablet Take 1 tablet (40 mg total) by mouth once daily.    potassium chloride (K-TAB) 20 mEq Take 20 mEq by mouth once daily.    ranolazine (RANEXA) 500 MG Tb12 500 mg 2 (two) times daily.    rivaroxaban (XARELTO) 2.5 mg Tab Take 2.5 mg by mouth 2 (two) times daily.    rosuvastatin (CRESTOR) 40 MG Tab Take 40 mg by mouth once daily.    sucralfate (CARAFATE) 1 gram tablet Take 1 tablet (1 g total) by mouth 4 (four) times daily before meals and nightly.    [DISCONTINUED] citalopram (CELEXA) 10 MG tablet     [DISCONTINUED] hydroCHLOROthiazide (HYDRODIURIL) 25 MG tablet     [DISCONTINUED] metOLazone (ZAROXOLYN) 2.5 MG  tablet Take 2.5 mg by mouth once daily. Only takes MON, WED AND FRI    [DISCONTINUED] nitroGLYCERIN 0.2 mg/hr TD PT24 (NITRODUR) 0.2 mg/hr     [DISCONTINUED] olmesartan (BENICAR) 20 MG tablet      Family History       Problem Relation (Age of Onset)    Cancer Father    Diabetes Sister, Brother    Heart failure Mother          Tobacco Use    Smoking status: Former Smoker     Types: Cigarettes     Quit date: 1/3/1972     Years since quittin.4    Smokeless tobacco: Never Used   Substance and Sexual Activity    Alcohol use: Not Currently    Drug use: Not on file    Sexual activity: Not on file     Review of Systems   Constitutional:  Positive for appetite change. Negative for chills and fever.   HENT:  Positive for trouble swallowing.    Eyes: Negative.    Respiratory: Negative.     Cardiovascular:  Positive for chest pain (related to the abd pain).   Gastrointestinal:  Positive for abdominal pain, nausea and vomiting. Negative for blood in stool, constipation and diarrhea.   Genitourinary: Negative.    Musculoskeletal: Negative.    Neurological:  Positive for headaches (sinus headache).   Psychiatric/Behavioral: Negative.     Objective:     Vital Signs (Most Recent):  Temp: 99 °F (37.2 °C) (22 1246)  Pulse: 110 (22 1325)  Resp: 16 (22 1358)  BP: 121/78 (22 1325)  SpO2: 97 % (22 1325) Vital Signs (24h Range):  Temp:  [99 °F (37.2 °C)] 99 °F (37.2 °C)  Pulse:  [] 110  Resp:  [16-21] 16  SpO2:  [97 %-98 %] 97 %  BP: ()/(68-78) 121/78        There is no height or weight on file to calculate BMI.    Physical Exam  Constitutional:       General: She is not in acute distress.     Appearance: Normal appearance. She is not ill-appearing or toxic-appearing.   HENT:      Head: Normocephalic and atraumatic.      Mouth/Throat:      Mouth: Mucous membranes are dry.      Pharynx: Oropharynx is clear.   Eyes:      General: No scleral icterus.     Conjunctiva/sclera: Conjunctivae normal.    Neck:      Vascular: No carotid bruit.   Cardiovascular:      Rate and Rhythm: Normal rate. Rhythm irregular.      Pulses: Normal pulses.      Heart sounds: Murmur (III/VI hsm) heard.     No friction rub. No gallop.      Comments: Bounding chest    Pulmonary:      Effort: Pulmonary effort is normal.      Breath sounds: Normal breath sounds.   Abdominal:      General: Abdomen is flat. Bowel sounds are normal.      Palpations: Abdomen is soft. There is no mass.      Tenderness: There is no abdominal tenderness. There is no guarding or rebound.   Musculoskeletal:         General: No swelling or deformity.      Cervical back: No rigidity or tenderness.      Right lower leg: No edema.      Left lower leg: No edema.   Lymphadenopathy:      Cervical: No cervical adenopathy.   Skin:     General: Skin is warm and dry.      Coloration: Skin is not jaundiced or pale.      Findings: No erythema.   Neurological:      General: No focal deficit present.      Mental Status: She is alert and oriented to person, place, and time.      Gait: Gait normal.   Psychiatric:         Mood and Affect: Mood normal.         Behavior: Behavior normal.         Thought Content: Thought content normal.         Judgment: Judgment normal.           Significant Labs: All pertinent labs within the past 24 hours have been reviewed.    Significant Imaging: I have reviewed all pertinent imaging results/findings within the past 24 hours.

## 2022-06-19 NOTE — ED PROVIDER NOTES
Encounter Date: 6/19/2022    SCRIBE #1 NOTE: I, Lesley Rosiemarielos, am scribing for, and in the presence of,  Tomasz Marin. I have scribed the entire note.       History     Chief Complaint   Patient presents with    Abdominal Pain     Upper abd pain, weakness,  n/v x 3 days, hx of afib     88 y/o female, with hx of A-fib, cholecystectomy, and HTN, presents to the ED due to generalized abd pain for 1 wk. The pain has been intermittent until today when it became constant. She has nausea and vomiting. Pt denies any fever, cough, chest pain, dysuria, and diarrhea. She is on Plavix and Xarelto.    The history is provided by the patient.   Abdominal Pain  The abdominal pain is generalized. The abdominal pain does not radiate. The other symptoms of the illness include nausea and vomiting. The other symptoms of the illness do not include fever, fatigue, diarrhea or dysuria.   Symptoms associated with the illness do not include chills, diaphoresis or constipation. Significant associated medical issues include GERD.     Review of patient's allergies indicates:  No Known Allergies  Past Medical History:   Diagnosis Date    A-fib     CAD (coronary artery disease)     CVA (cerebrovascular accident)     Dyspnea on exertion     GERD (gastroesophageal reflux disease)     HTN (hypertension)     Mixed hyperlipidemia     S/P AVR (aortic valve replacement)     Synovial cyst of knee     TIA (transient ischemic attack)     Unspecified hemorrhoids     Unspecified osteoarthritis, unspecified site      Past Surgical History:   Procedure Laterality Date    ANGIOGRAM, CORONARY, WITH LEFT HEART CATHETERIZATION      BUNIONECTOMY Right     CATARACT EXTRACTION Right     CHOLECYSTECTOMY      CORONARY STENT PLACEMENT      left lower lobectomy Left      Family History   Problem Relation Age of Onset    Heart failure Mother     Cancer Father     Diabetes Sister     Diabetes Brother      Social History     Tobacco Use    Smoking  status: Former Smoker     Types: Cigarettes     Quit date: 1/3/1972     Years since quittin.5    Smokeless tobacco: Never Used    Tobacco comment: quit in    Substance Use Topics    Alcohol use: Not Currently     Review of Systems   Constitutional: Negative for chills, diaphoresis, fatigue, fever and unexpected weight change.   HENT: Negative.    Eyes: Negative.    Respiratory: Negative.    Cardiovascular: Negative.    Gastrointestinal: Positive for abdominal pain, nausea and vomiting. Negative for blood in stool, constipation and diarrhea.   Genitourinary: Negative.  Negative for dysuria.   Musculoskeletal: Negative.    Skin: Negative for rash.   Neurological: Negative.    Psychiatric/Behavioral: Negative.        Physical Exam     Initial Vitals [22 1246]   BP Pulse Resp Temp SpO2   97/68 72 18 99 °F (37.2 °C) 98 %      MAP       --         Physical Exam    Nursing note and vitals reviewed.  Constitutional: No distress.   HENT:   Head: Normocephalic and atraumatic.   Mouth/Throat: Mucous membranes are dry.   Eyes: EOM are normal.   Neck: Trachea normal. Neck supple.   Normal range of motion.  Cardiovascular: Normal rate and regular rhythm.   No murmur heard.  Pulmonary/Chest: Breath sounds normal. No respiratory distress.   Abdominal: Abdomen is soft. Bowel sounds are normal. She exhibits no distension. There is no abdominal tenderness.   Mild generalized abdominal tenderness to palpation There is no rebound and no guarding.   Musculoskeletal:         General: Normal range of motion.      Cervical back: Normal range of motion and neck supple.      Lumbar back: Normal range of motion.     Neurological: She is alert and oriented to person, place, and time. She has normal strength.   Skin: Skin is warm and dry. No rash noted.   Psychiatric: She has a normal mood and affect.         ED Course   Critical Care    Date/Time: 2022 1:39 AM  Performed by: Tomasz Marin MD  Authorized by: Tierra CALVILLO  MD Alex   Direct patient critical care time: 10 minutes  Ordering / reviewing critical care time: 10 minutes  Documentation critical care time: 10 minutes  Consulting other physicians critical care time: 5 minutes  Total critical care time (exclusive of procedural time) : 35 minutes  Critical care time was exclusive of separately billable procedures and treating other patients and teaching time.  Critical care was necessary to treat or prevent imminent or life-threatening deterioration of the following conditions: dehydration and metabolic crisis.  Critical care was time spent personally by me on the following activities: development of treatment plan with patient or surrogate, discussions with primary provider, interpretation of cardiac output measurements, evaluation of patient's response to treatment, examination of patient, ordering and performing treatments and interventions, ordering and review of laboratory studies and re-evaluation of patient's condition.        Labs Reviewed   COMPREHENSIVE METABOLIC PANEL - Abnormal; Notable for the following components:       Result Value    Sodium Level 135 (*)     Chloride 90 (*)     Glucose Level 146 (*)     Blood Urea Nitrogen 62.5 (*)     Creatinine 2.56 (*)     Calcium Level Total 10.8 (*)     Protein Total 7.9 (*)     Globulin 4.0 (*)     Albumin/Globulin Ratio 1.0 (*)     Aspartate Aminotransferase 88 (*)     All other components within normal limits   URINALYSIS, REFLEX TO URINE CULTURE - Abnormal; Notable for the following components:    Leukocyte Esterase, UA Trace (*)     All other components within normal limits   TSH - Abnormal; Notable for the following components:    Thyroid Stimulating Hormone 5.7676 (*)     All other components within normal limits   B-TYPE NATRIURETIC PEPTIDE - Abnormal; Notable for the following components:    Natriuretic Peptide 1,032.4 (*)     All other components within normal limits   TROPONIN I - Abnormal; Notable for the  following components:    Troponin-I 0.055 (*)     All other components within normal limits   CBC WITH DIFFERENTIAL - Abnormal; Notable for the following components:    MCH 24.8 (*)     MCHC 29.1 (*)     RDW 17.3 (*)     Platelet 426 (*)     MPV 12.8 (*)     IG# 0.04 (*)     IG% 0.5 (*)     All other components within normal limits   LIPASE - Abnormal; Notable for the following components:    Lipase Level 125 (*)     All other components within normal limits   TROPONIN I - Abnormal; Notable for the following components:    Troponin-I 0.070 (*)     All other components within normal limits   TROPONIN I - Abnormal; Notable for the following components:    Troponin-I 0.071 (*)     All other components within normal limits   COMPREHENSIVE METABOLIC PANEL - Abnormal; Notable for the following components:    Sodium Level 131 (*)     Chloride 93 (*)     Blood Urea Nitrogen 59.6 (*)     Creatinine 2.20 (*)     Albumin Level 2.8 (*)     Albumin/Globulin Ratio 0.9 (*)     Alkaline Phosphatase 39 (*)     Aspartate Aminotransferase 77 (*)     All other components within normal limits   CBC WITH DIFFERENTIAL - Abnormal; Notable for the following components:    RBC 4.07 (*)     Hgb 10.2 (*)     Hct 34.2 (*)     MCH 25.1 (*)     MCHC 29.8 (*)     RDW 17.2 (*)     MPV 12.8 (*)     IG# 0.02 (*)     All other components within normal limits   TROPONIN I - Abnormal; Notable for the following components:    Troponin-I 0.054 (*)     All other components within normal limits   TROPONIN I - Abnormal; Notable for the following components:    Troponin-I 0.051 (*)     All other components within normal limits   T4, FREE - Normal   PROTIME-INR - Normal   APTT - Normal   URINALYSIS, MICROSCOPIC - Normal   CBC W/ AUTO DIFFERENTIAL    Narrative:     The following orders were created for panel order CBC Auto Differential.  Procedure                               Abnormality         Status                     ---------                                -----------         ------                     CBC with Differential[[id number]]        Abnormal            Final result                 Please view results for these tests on the individual orders.   CBC W/ AUTO DIFFERENTIAL    Narrative:     The following orders were created for panel order CBC Auto Differential.  Procedure                               Abnormality         Status                     ---------                               -----------         ------                     CBC with Differential[[id number]]        Abnormal            Final result                 Please view results for these tests on the individual orders.     EKG Readings: (Independently Interpreted)   Rhythm: Normal Sinus Rhythm. Heart Rate: 97. Ectopy: No Ectopy. Conduction: 1st Degree AV Block. ST Segments: Normal ST Segments. T Waves: Normal. Clinical Impression: Normal Sinus Rhythm   1239     ECG Results          EKG 12-LEAD (Final result)  Result time 06/30/22 12:32:25    Final result by Unknown User (06/30/22 12:32:25)                                Imaging Results          CT Abdomen Pelvis  Without Contrast (Final result)  Result time 06/19/22 14:36:41    Final result by Pradeep Mckeon MD (06/19/22 14:36:41)                 Impression:      1. No acute inflammatory abdominopelvic pathology.  No bowel obstruction.  No obstructing nephroureterolithiasis or hydroureteronephrosis.  2. Nonobstructing right nephrolithiasis.  Punctate nonobstructing left renal calculi versus vascular calcifications.  3. An intermediate density partially exophytic 2.0 x 1.6 cm structure arising from the interpolar region of the right kidney is incompletely characterized on this noncontrast examination but cannot be reliably classified as a simple or hemorrhagic cyst.  4. Mild scattered colonic diverticulosis without focal peridiverticular inflammation.  5. Additional nonacute and incidental secondary findings, as detailed above.      Electronically  signed by: Pradeep Mckeon  Date:    06/19/2022  Time:    14:36             Narrative:    EXAMINATION:  CT ABDOMEN PELVIS WITHOUT CONTRAST    CLINICAL HISTORY:  Abdominal pain, acute, nonlocalized;Generalized abdominal pain associated with nausea and vomiting.  History of cholecystectomy;    TECHNIQUE:  Multiple axial CT images were obtained from the lung bases through the symphysis pubis without the administration of intravenous contrast and reformatted in the coronal and sagittal planes. Total  mGy*cm. Automated exposure control was utilized for this examination as a radiation dose lowering technique.    COMPARISON:  No relevant prior studies are available for comparison.    FINDINGS:  Inferior heart size is moderately enlarged.  No pericardial effusion.  Partially imaged postsurgical changes of median sternotomy and presumed CABG.  Dense native multivessel calcific coronary atherosclerosis.  Mitral and aortic annular prostheses.    Chronic appearing reticular interstitial changes/scarring of the imaged lung bases, which are otherwise without dense focal consolidation, suspicious solid pulmonary mass or nodule, pleural effusion, or pneumothorax.    The liver is normal in size and attenuation.  No focal suspicious hepatic mass lesions are identified on this noncontrast examination.  Status post cholecystectomy, with the expected mild central intrahepatic and extrahepatic biliary ductal prominence without nat dilatation.  No peripancreatic inflammation or pancreatic ductal dilatation.  The spleen is within normal limits.    No suspicious adrenal masses or nodules.    The kidneys are symmetric in size.  Multiple predominantly simple appearing cysts of the kidneys bilaterally, largest arising exophytically from the upper pole of the right kidney.  Intermediate density partially exophytic 2.0 x 1.6 cm structure arising from the interpolar region of the right kidney (series 2, image 29) is incompletely characterized  on this noncontrast examination but cannot be reliably classified as a simple or hemorrhagic cyst.  Punctate nonobstructing calculus within the interpolar region of the right kidney.  Punctate nonobstructing left renal calculi versus vascular calcifications.  No obstructing nephroureterolithiasis, hydroureteronephrosis, or perinephric/periureteral inflammation.  The bilateral ureters are unobstructed to a mildly distended urinary bladder, which is without suspicious wall thickening or intraluminal mass lesion.    No suspicious adnexal mass lesions identified on this noncontrast examination.  Calcified parametrial veins.  The vagina and perineum are within normal limits.    The small and large bowel are without obstruction or inflammation.  Low to moderate colonic and rectal fecal burden.  Mild scattered colonic diverticulosis without focal peridiverticular inflammation.  The appendix and terminal ileum are normal in appearance.  The small bowel is nondilated.  No pathologically enlarged lymph nodes are identified within the abdomen and pelvis on this noncontrast examination.  No free fluid or free air.    Heavy calcific atherosclerosis of the abdominopelvic arteriovascular structures, which are otherwise nonaneurysmal.  Kissing bilateral iliac venous stents.    The soft tissues are without acute process.  Fatty replacement of the paraspinous musculature, rectus sheath, and muscles about the pelvic girdle.    The osseous structures are without acute fracture, dislocation, or aggressive appearing bone lesion.  Mild thoracolumbar spondylosis.                               X-Ray Chest 1 View (Final result)  Result time 06/19/22 13:03:03    Final result by Pradeep Mckeon MD (06/19/22 13:03:03)                 Impression:      No acute cardiopulmonary process.      Electronically signed by: Pradeep Mckeon  Date:    06/19/2022  Time:    13:03             Narrative:    EXAMINATION:  XR CHEST 1 VIEW    CLINICAL HISTORY:  Chest  pain, unspecified    TECHNIQUE:  Frontal view of the chest.    COMPARISON:  Chest radiograph 01/25/2022.    FINDINGS:  Stable postsurgical changes of median sternotomy and cardiac annular prosthesis.  The cardiomediastinal silhouette is stable in size and contour. Pulmonary vascularity is within normal limits. The lungs are well-inflated and are without focal consolidation, pleural effusion, or pneumothorax.  Mild bibasilar atelectasis versus scarring.    The imaged osseous structures and soft tissues are without acute abnormality.                              X-Rays:   Independently Interpreted Readings:   Other Readings:  No acute changes seen on chest x-ray    Medications   sodium chloride 0.9% bolus 500 mL (0 mLs Intravenous Stopped 6/19/22 1459)   ondansetron injection 4 mg (4 mg Intravenous Given 6/19/22 1359)   morphine injection 4 mg (4 mg Intravenous Given 6/19/22 1358)     Medical Decision Making:   Clinical Tests:   Radiological Study: Ordered            Attending Attestation:           Physician Attestation for Scribe:  Physician Attestation Statement for Scribe #1: I, Tomasz Marin, reviewed documentation, as scribed by Lesley Mcconnell in my presence, and it is both accurate and complete.             ED Course as of 07/19/22 0139   Sun Jun 19, 2022   1500 Dr. Xiong is on-call for EDGAR Barton to admit [KG]   1501 Patient states pain in nausea have improved. [KG]      ED Course User Index  [KG] Tomasz Marin MD             Clinical Impression:   Final diagnoses:  [R07.9] Chest pain  [R10.13] Epigastric pain (Primary)  [R11.2] Non-intractable vomiting with nausea, unspecified vomiting type  [N17.9] Acute kidney injury  [E86.0] Acute dehydration          ED Disposition Condition    Admit               Tomasz Marin MD  06/20/22 2342       Tomasz Marin MD  07/19/22 0139

## 2022-06-19 NOTE — FIRST PROVIDER EVALUATION
Medical screening exam completed.  I have conducted a focused provider triage encounter, findings are as follows:    Brief history of present illness:  87 year old female presents to ED for upper abdominal pain, weakness, and nausea/vomiting x 3 days; Reports hx of A fib     Vitals:    06/19/22 1246   BP: 97/68   Pulse: 72   Resp: 18   Temp: 99 °F (37.2 °C)   TempSrc: Oral   SpO2: 98%       Pertinent physical exam:  Awake, alert, weak    Brief workup plan:  CBC, CMP, Lipase, UA, EKG, EKG, Troponin, BNP, Chest x-ray, TSH, Free T4    Preliminary workup initiated; this workup will be continued and followed by the physician or advanced practice provider that is assigned to the patient when roomed.

## 2022-06-20 NOTE — H&P
[facility] Medicine History & Physical Examination       Patient Name: Dee Haji  MRN: [id number]  Patient Class: IP- Inpatient   Admission Date: 6/19/2022   Admitting Physician: DELGADO Service   Length of Stay: 1  Attending Physician: Tierra Johnson MD  Primary Care Provider: Tierra Johnson MD  Face-to-Face encounter date: 06/20/2022  Code Status: Full    Chief Complaint: Abdominal Pain (Upper abd pain, weakness,  n/v x 3 days, hx of afib)        Patient information was obtained from patient, patient's family, past medical records and ER records.     HISTORY OF PRESENT ILLNESS:   Dee Haji is a 87 y.o. female who  has a past medical history of Dyspnea on exertion, GERD (gastroesophageal reflux disease), HTN (hypertension), Synovial cyst of knee, TIA (transient ischemic attack), Unspecified hemorrhoids, and Unspecified osteoarthritis, unspecified site.. The patient presented to [facility] on 6/19/2022 with a primary complaint of upper abdominal pain with nausea and vomiting    Ms. Price is a 87-year-old female in with history of atrial fibrillation, hypertension, CKD, GERD, status post mitral valve replacement with CHF that presented to the ED with generalized abdominal pain with some nausea and vomiting.  No complaint of fever, cough, chest pain.  No dysuria, diarrhea, BRBPR or melanotic stools.  Patient is anticoagulated with Plavix  And Xarelto  She describes her abdominal pain as generalized accompanied by nausea and vomiting since 1 day.  She also complains of feeling weak and was seen last week in the office with similar complaints.  Always has an elevated borderline BNP.  Blood pressure is on the lower side of normal under 100 systolic range    PAST MEDICAL HISTORY:     Past Medical History:   Diagnosis Date    Dyspnea on exertion     GERD (gastroesophageal reflux disease)     HTN (hypertension)     Synovial cyst of knee     TIA (transient  ischemic attack)     Unspecified hemorrhoids     Unspecified osteoarthritis, unspecified site        PAST SURGICAL HISTORY:     Past Surgical History:   Procedure Laterality Date    ANGIOGRAM, CORONARY, WITH LEFT HEART CATHETERIZATION      BUNIONECTOMY Right     CATARACT EXTRACTION Right     CHOLECYSTECTOMY      CORONARY STENT PLACEMENT      left lower lobectomy Left        ALLERGIES:   Patient has no known allergies.    FAMILY HISTORY:   Reviewed and negative    SOCIAL HISTORY:     Social History     Tobacco Use    Smoking status: Former Smoker     Types: Cigarettes     Quit date: 1/3/1972     Years since quittin.4    Smokeless tobacco: Never Used   Substance Use Topics    Alcohol use: Not Currently        HOME MEDICATIONS:     Prior to Admission medications    Medication Sig Start Date End Date Taking? Authorizing Provider   alpha lipoic acid 200 mg Cap Take 200 mg by mouth. 21  Yes Historical Provider   clopidogreL (PLAVIX) 75 mg tablet Take 75 mg by mouth once daily. 12/10/21  Yes Historical Provider   fenofibrate micronized (LOFIBRA) 134 MG Cap Take 134 mg by mouth once daily at 6am. 22  Yes Historical Provider   finerenone 10 mg Tab Take 10 mg by mouth. 12/10/21  Yes Historical Provider   furosemide (LASIX) 20 MG tablet Take 20 mg by mouth 2 (two) times a day. 3/22/22  Yes Historical Provider   ginkgo biloba 120 mg Tab   0 Refill(s) 21  Yes Historical Provider   potassium chloride (K-TAB) 20 mEq Take 20 mEq by mouth once daily. 22  Yes Historical Provider   ranolazine (RANEXA) 500 MG Tb12 500 mg 2 (two) times daily. 21  Yes Historical Provider   rivaroxaban (XARELTO) 2.5 mg Tab Take 2.5 mg by mouth 2 (two) times daily. 21  Yes Historical Provider   rosuvastatin (CRESTOR) 40 MG Tab Take 40 mg by mouth once daily. 21  Yes Historical Provider   docusate sodium (COLACE) 100 MG capsule Take 100 mg by mouth 2 (two) times daily. 22   Historical Provider    ondansetron (ZOFRAN) 4 MG tablet Take 4 mg by mouth. 1/4/22   Historical Provider   pantoprazole (PROTONIX) 40 MG tablet Take 1 tablet (40 mg total) by mouth once daily. 5/3/22 5/3/23  Tierra Johnson MD   sucralfate (CARAFATE) 1 gram tablet Take 1 tablet (1 g total) by mouth 4 (four) times daily before meals and nightly. 5/3/22   Tierra Johnson MD   citalopram (CELEXA) 10 MG tablet  12/2/21 6/19/22  Historical Provider   hydroCHLOROthiazide (HYDRODIURIL) 25 MG tablet  12/9/21 6/19/22  Historical Provider   metOLazone (ZAROXOLYN) 2.5 MG tablet Take 2.5 mg by mouth once daily. Only takes MON, WED AND FRI  6/19/22  Historical Provider   nitroGLYCERIN 0.2 mg/hr TD PT24 (NITRODUR) 0.2 mg/hr  12/18/21 6/19/22  Historical Provider   olmesartan (BENICAR) 20 MG tablet  12/9/21 6/19/22  Historical Provider       REVIEW OF SYSTEMS:   Except as documented, all other systems reviewed and negative     PHYSICAL EXAM:     VITAL SIGNS: 24 HRS MIN & MAX LAST   Temp  Min: 99 °F (37.2 °C)  Max: 99 °F (37.2 °C) 99 °F (37.2 °C)   BP  Min: 93/68  Max: 134/88 102/61   Pulse  Min: 72  Max: 110  80   Resp  Min: 13  Max: 21 18   SpO2  Min: 94 %  Max: 98 % (!) 94 %       General appearance:  Alert oriented, frail, elderly   HENT: Atraumatic head. Moist mucous membranes of oral cavity.  Eyes: Normal extraocular movements.   Neck: Supple.   Lungs: Clear to auscultation bilaterally. No wheezing present.   Heart: Regular rhythm. S1 and S2 tachycardia  Abdomen: Soft, non-distended, non-tender. No rebound tenderness/guarding.   Skin: No Rash.   Neuro:  Nonfocal  Psych/mental status: Appropriate mood and affect. Responds appropriately to questions.     LABS AND IMAGING:     Recent Labs   Lab 06/13/22  1539 06/19/22  1333 06/20/22  0215   WBC 6.9 8.3 7.3   RBC 4.49 4.95 4.07*   HGB 11.2* 12.3 10.2*   HCT 37.6 42.2 34.2*   MCV 83.7 85.3 84.0   MCH 24.9* 24.8* 25.1*   MCHC 29.8* 29.1* 29.8*   RDW 17.2* 17.3* 17.2*    426* 255   MPV  12.4 12.8* 12.8*       Recent Labs   Lab 06/13/22  1539 06/19/22  1333 06/20/22  0215    135* 131*   K 4.9 4.0 4.0   CO2 30 31 31   BUN 52.9* 62.5* 59.6*   CREATININE 2.34* 2.56* 2.20*   CALCIUM 10.1 10.8* 9.2   ALBUMIN 3.7 3.9 2.8*   ALKPHOS 51 54 39*   ALT 18 21 17   AST 70* 88* 77*   BILITOT 0.9 1.1 1.0       Microbiology Results (last 7 days)     ** No results found for the last 168 hours. **           CT Abdomen Pelvis  Without Contrast  Narrative: EXAMINATION:  CT ABDOMEN PELVIS WITHOUT CONTRAST    CLINICAL HISTORY:  Abdominal pain, acute, nonlocalized;Generalized abdominal pain associated with nausea and vomiting.  History of cholecystectomy;    TECHNIQUE:  Multiple axial CT images were obtained from the lung bases through the symphysis pubis without the administration of intravenous contrast and reformatted in the coronal and sagittal planes. Total  mGy*cm. Automated exposure control was utilized for this examination as a radiation dose lowering technique.    COMPARISON:  No relevant prior studies are available for comparison.    FINDINGS:  Inferior heart size is moderately enlarged.  No pericardial effusion.  Partially imaged postsurgical changes of median sternotomy and presumed CABG.  Dense native multivessel calcific coronary atherosclerosis.  Mitral and aortic annular prostheses.    Chronic appearing reticular interstitial changes/scarring of the imaged lung bases, which are otherwise without dense focal consolidation, suspicious solid pulmonary mass or nodule, pleural effusion, or pneumothorax.    The liver is normal in size and attenuation.  No focal suspicious hepatic mass lesions are identified on this noncontrast examination.  Status post cholecystectomy, with the expected mild central intrahepatic and extrahepatic biliary ductal prominence without nat dilatation.  No peripancreatic inflammation or pancreatic ductal dilatation.  The spleen is within normal limits.    No suspicious  adrenal masses or nodules.    The kidneys are symmetric in size.  Multiple predominantly simple appearing cysts of the kidneys bilaterally, largest arising exophytically from the upper pole of the right kidney.  Intermediate density partially exophytic 2.0 x 1.6 cm structure arising from the interpolar region of the right kidney (series 2, image 29) is incompletely characterized on this noncontrast examination but cannot be reliably classified as a simple or hemorrhagic cyst.  Punctate nonobstructing calculus within the interpolar region of the right kidney.  Punctate nonobstructing left renal calculi versus vascular calcifications.  No obstructing nephroureterolithiasis, hydroureteronephrosis, or perinephric/periureteral inflammation.  The bilateral ureters are unobstructed to a mildly distended urinary bladder, which is without suspicious wall thickening or intraluminal mass lesion.    No suspicious adnexal mass lesions identified on this noncontrast examination.  Calcified parametrial veins.  The vagina and perineum are within normal limits.    The small and large bowel are without obstruction or inflammation.  Low to moderate colonic and rectal fecal burden.  Mild scattered colonic diverticulosis without focal peridiverticular inflammation.  The appendix and terminal ileum are normal in appearance.  The small bowel is nondilated.  No pathologically enlarged lymph nodes are identified within the abdomen and pelvis on this noncontrast examination.  No free fluid or free air.    Heavy calcific atherosclerosis of the abdominopelvic arteriovascular structures, which are otherwise nonaneurysmal.  Kissing bilateral iliac venous stents.    The soft tissues are without acute process.  Fatty replacement of the paraspinous musculature, rectus sheath, and muscles about the pelvic girdle.    The osseous structures are without acute fracture, dislocation, or aggressive appearing bone lesion.  Mild thoracolumbar  spondylosis.  Impression: 1. No acute inflammatory abdominopelvic pathology.  No bowel obstruction.  No obstructing nephroureterolithiasis or hydroureteronephrosis.  2. Nonobstructing right nephrolithiasis.  Punctate nonobstructing left renal calculi versus vascular calcifications.  3. An intermediate density partially exophytic 2.0 x 1.6 cm structure arising from the interpolar region of the right kidney is incompletely characterized on this noncontrast examination but cannot be reliably classified as a simple or hemorrhagic cyst.  4. Mild scattered colonic diverticulosis without focal peridiverticular inflammation.  5. Additional nonacute and incidental secondary findings, as detailed above.    Electronically signed by: Pradeep Mckeon  Date:    06/19/2022  Time:    14:36  X-Ray Chest 1 View  Narrative: EXAMINATION:  XR CHEST 1 VIEW    CLINICAL HISTORY:  Chest pain, unspecified    TECHNIQUE:  Frontal view of the chest.    COMPARISON:  Chest radiograph 01/25/2022.    FINDINGS:  Stable postsurgical changes of median sternotomy and cardiac annular prosthesis.  The cardiomediastinal silhouette is stable in size and contour. Pulmonary vascularity is within normal limits. The lungs are well-inflated and are without focal consolidation, pleural effusion, or pneumothorax.  Mild bibasilar atelectasis versus scarring.    The imaged osseous structures and soft tissues are without acute abnormality.  Impression: No acute cardiopulmonary process.    Electronically signed by: Pradeep Mckeon  Date:    06/19/2022  Time:    13:03        ASSESSMENT & PLAN:   1. Abdominal pain with N/V  - CT Abdo is as noted above. No acute findings to attribute the symptoms to except mild diverticular disease, no inflammatory changes however   - Noted with non obstructing renal calculus  - Prn Analgesics and anteemetics  - Monitor renal indices     2. Acute on CKD 3  - Monitor renal indices with gente hydration  -Avoid nephrotoxic meds    3. Aterosclerotic  heart disease  -Medically optimized.  - Continue home meds when reconciled by nursing    4. Afib  - Anticoagulated, continue  - rate controlled    5. HTN  Well-controlled with current meds, , infact on lower side of normal   - low-sodium diet  - Titrate meds to avoid weakness from hypotension    6. HLD  - Resume statin and fenofibrate    VTE Prophylaxis: Xarelto/     Patient condition:  Fair  __________________________________________________________________________  INPATIENT LIST OF MEDICATIONS     Scheduled Meds:   atorvastatin  20 mg Oral QHS    clopidogreL  75 mg Oral Daily    docusate sodium  100 mg Oral BID    fenofibrate  48 mg Oral Daily    finerenone  10 mg Oral Daily    pantoprazole  40 mg Oral Daily    ranolazine  500 mg Oral BID    rivaroxaban  2.5 mg Oral BID WM    sucralfate  1 g Oral QID (AC & HS)     Continuous Infusions:   sodium chloride 0.9% 75 mL/hr at 06/19/22 1900     PRN Meds:.acetaminophen, ondansetron    All diagnosis and differential diagnosis have been reviewed; assessment and plan has been documented; I have personally reviewed the labs and test results that are presently available; I have reviewed the patients medication list; I have reviewed the consulting providers response and recommendations. I have reviewed or attempted to review medical records based upon their availability.    All of the patient's questions have been addressed and answered. Patient's is agreeable to the above stated plan. I will continue to monitor closely and make adjustments to medical management as needed.      Tierra Johnson MD   06/20/2022

## 2022-06-20 NOTE — CLINICAL REVIEW
87-year-old female admitted on 06/19/2022 with generalized abdominal pain.  History includes atrial fibrillation, hypertension.  In concert, she did have some nausea and vomiting.  Labs demonstrated mild hyponatremia, creatinine of 2.56.  CT scan of the abdomen and pelvis was unremarkable.  She was started on overall supportive care.  She was treated for acute on chronic kidney injury stage III.  Labs today demonstrated worsening hyponatremia from 135-131.  She continues on isotonic saline.  She remains still on a clear liquid diet.  In the setting of inability to advance diet be on clear liquid, the need for IV fluids, antiemetics, analgesics, it is expected that her hospitalization will extend beyond 2 midnights.  She is appropriate for an IP LOC    Itzel White MD  Utilization Management  Physician Advisor

## 2022-06-21 PROBLEM — R07.9 CHEST PAIN: Status: RESOLVED | Noted: 2022-01-01 | Resolved: 2022-01-01

## 2022-06-21 PROBLEM — R10.9 ABDOMINAL PAIN: Status: RESOLVED | Noted: 2022-01-01 | Resolved: 2022-01-01

## 2022-06-21 NOTE — DISCHARGE SUMMARY
[facility] Medicine Discharge Summary    Admit Date: 6/19/2022  Discharge Date and Time: 6/21/202212:56 PM  Admitting Physician: [unfilled]  Discharging Physician: Tierra Johnson MD.  Primary Care Physician: Tierra Johnson MD  Consults: None    Discharge Diagnoses:  Hyponatremia, weakness, Nausea and vomitting     Hospital Course:   Ms. Price is a 87-year-old female in with history of atrial fibrillation, hypertension, CKD, GERD, status post mitral valve replacement with CHF that presented to the ED with generalized abdominal pain with some nausea and vomiting.  No complaint of fever, cough, chest pain.  No dysuria, diarrhea, BRBPR or melanotic stools.  Patient is anticoagulated with Plavix  And Xarelto  She describes her abdominal pain as generalized accompanied by nausea and vomiting since 1 day.  She also complains of feeling weak and was seen last week in the office with similar complaints.  Always has an elevated borderline BNP.  Blood pressure is on the lower side of normal under 100 systolic range  Home medications were adjusted, Lasix was reduced to 20 mg p.o. daily, metolazone was discontinued as well as the hydrochlorothiazide.  Depending on how she does over the next few weeks, may resume these medications 1 at a time.  Blood pressure is on the lower side of normal which may be contributing to patient's overall feeling unwell.  Has not had any episodes of nausea or vomiting and abdominal pain seems to have resolved.  We will get a BMP completed and I will keep a close follow-up with the patient upon discharge.  Overall her other medical comorbidities have remained stable.  Pt was seen and examined on the day of discharge  Vitals:  VITAL SIGNS: 24 HRS MIN & MAX LAST   Temp  Min: 97.1 °F (36.2 °C)  Max: 98.2 °F (36.8 °C) 98.2 °F (36.8 °C)   BP  Min: 107/66  Max: 119/69 112/74   Pulse  Min: 82  Max: 97  94   Resp  Min: 18  Max: 21 20   SpO2  Min:  95 %  Max: 99 % 95 %       Physical Exam:  General:  Alert oriented, no acute distress, frail, elderly  Cardiovascular:  S1-S2, systolic murmur  Abdomen:  Soft, nontender, nondistended  Respiratory:  Clear to auscultation bilaterally  Neuro:  Nonfocal    Procedures Performed: No admission procedures for hospital encounter.     Significant Diagnostic Studies: See Full reports for all details    Recent Labs   Lab 06/19/22  1333 06/20/22  0215   WBC 8.3 7.3   RBC 4.95 4.07*   HGB 12.3 10.2*   HCT 42.2 34.2*   MCV 85.3 84.0   MCH 24.8* 25.1*   MCHC 29.1* 29.8*   RDW 17.3* 17.2*   * 255   MPV 12.8* 12.8*       Recent Labs   Lab 06/19/22  1333 06/20/22  0215   * 131*   K 4.0 4.0   CO2 31 31   BUN 62.5* 59.6*   CREATININE 2.56* 2.20*   CALCIUM 10.8* 9.2   ALBUMIN 3.9 2.8*   ALKPHOS 54 39*   ALT 21 17   AST 88* 77*   BILITOT 1.1 1.0        Microbiology Results (last 7 days)     ** No results found for the last 168 hours. **           CT Abdomen Pelvis  Without Contrast  Narrative: EXAMINATION:  CT ABDOMEN PELVIS WITHOUT CONTRAST    CLINICAL HISTORY:  Abdominal pain, acute, nonlocalized;Generalized abdominal pain associated with nausea and vomiting.  History of cholecystectomy;    TECHNIQUE:  Multiple axial CT images were obtained from the lung bases through the symphysis pubis without the administration of intravenous contrast and reformatted in the coronal and sagittal planes. Total  mGy*cm. Automated exposure control was utilized for this examination as a radiation dose lowering technique.    COMPARISON:  No relevant prior studies are available for comparison.    FINDINGS:  Inferior heart size is moderately enlarged.  No pericardial effusion.  Partially imaged postsurgical changes of median sternotomy and presumed CABG.  Dense native multivessel calcific coronary atherosclerosis.  Mitral and aortic annular prostheses.    Chronic appearing reticular interstitial changes/scarring of the imaged lung  bases, which are otherwise without dense focal consolidation, suspicious solid pulmonary mass or nodule, pleural effusion, or pneumothorax.    The liver is normal in size and attenuation.  No focal suspicious hepatic mass lesions are identified on this noncontrast examination.  Status post cholecystectomy, with the expected mild central intrahepatic and extrahepatic biliary ductal prominence without nat dilatation.  No peripancreatic inflammation or pancreatic ductal dilatation.  The spleen is within normal limits.    No suspicious adrenal masses or nodules.    The kidneys are symmetric in size.  Multiple predominantly simple appearing cysts of the kidneys bilaterally, largest arising exophytically from the upper pole of the right kidney.  Intermediate density partially exophytic 2.0 x 1.6 cm structure arising from the interpolar region of the right kidney (series 2, image 29) is incompletely characterized on this noncontrast examination but cannot be reliably classified as a simple or hemorrhagic cyst.  Punctate nonobstructing calculus within the interpolar region of the right kidney.  Punctate nonobstructing left renal calculi versus vascular calcifications.  No obstructing nephroureterolithiasis, hydroureteronephrosis, or perinephric/periureteral inflammation.  The bilateral ureters are unobstructed to a mildly distended urinary bladder, which is without suspicious wall thickening or intraluminal mass lesion.    No suspicious adnexal mass lesions identified on this noncontrast examination.  Calcified parametrial veins.  The vagina and perineum are within normal limits.    The small and large bowel are without obstruction or inflammation.  Low to moderate colonic and rectal fecal burden.  Mild scattered colonic diverticulosis without focal peridiverticular inflammation.  The appendix and terminal ileum are normal in appearance.  The small bowel is nondilated.  No pathologically enlarged lymph nodes are  identified within the abdomen and pelvis on this noncontrast examination.  No free fluid or free air.    Heavy calcific atherosclerosis of the abdominopelvic arteriovascular structures, which are otherwise nonaneurysmal.  Kissing bilateral iliac venous stents.    The soft tissues are without acute process.  Fatty replacement of the paraspinous musculature, rectus sheath, and muscles about the pelvic girdle.    The osseous structures are without acute fracture, dislocation, or aggressive appearing bone lesion.  Mild thoracolumbar spondylosis.  Impression: 1. No acute inflammatory abdominopelvic pathology.  No bowel obstruction.  No obstructing nephroureterolithiasis or hydroureteronephrosis.  2. Nonobstructing right nephrolithiasis.  Punctate nonobstructing left renal calculi versus vascular calcifications.  3. An intermediate density partially exophytic 2.0 x 1.6 cm structure arising from the interpolar region of the right kidney is incompletely characterized on this noncontrast examination but cannot be reliably classified as a simple or hemorrhagic cyst.  4. Mild scattered colonic diverticulosis without focal peridiverticular inflammation.  5. Additional nonacute and incidental secondary findings, as detailed above.    Electronically signed by: Pradeep Mckeon  Date:    06/19/2022  Time:    14:36  X-Ray Chest 1 View  Narrative: EXAMINATION:  XR CHEST 1 VIEW    CLINICAL HISTORY:  Chest pain, unspecified    TECHNIQUE:  Frontal view of the chest.    COMPARISON:  Chest radiograph 01/25/2022.    FINDINGS:  Stable postsurgical changes of median sternotomy and cardiac annular prosthesis.  The cardiomediastinal silhouette is stable in size and contour. Pulmonary vascularity is within normal limits. The lungs are well-inflated and are without focal consolidation, pleural effusion, or pneumothorax.  Mild bibasilar atelectasis versus scarring.    The imaged osseous structures and soft tissues are without acute  abnormality.  Impression: No acute cardiopulmonary process.    Electronically signed by: Pradeep Mckeon  Date:    06/19/2022  Time:    13:03         Medication List      CHANGE how you take these medications    furosemide 20 MG tablet  Commonly known as: LASIX  Take 1 tablet (20 mg total) by mouth once daily.  What changed: when to take this     nitroGLYCERIN 0.4 MG SL tablet  Commonly known as: NITROSTAT  What changed: Another medication with the same name was removed. Continue taking this medication, and follow the directions you see here.        CONTINUE taking these medications    alpha lipoic acid 200 mg Cap     CENTRUM SILVER ORAL     cholecalciferol (vitamin D3) 125 mcg (5,000 unit) capsule     clopidogreL 75 mg tablet  Commonly known as: PLAVIX     docusate sodium 100 MG capsule  Commonly known as: COLACE     fenofibrate micronized 134 MG Cap  Commonly known as: LOFIBRA     finerenone 10 mg Tab     ginkgo biloba 120 mg Tab     ondansetron 4 MG tablet  Commonly known as: ZOFRAN     pantoprazole 40 MG tablet  Commonly known as: PROTONIX  Take 1 tablet (40 mg total) by mouth once daily.     potassium chloride 20 mEq  Commonly known as: K-TAB     PRESERVISION AREDS ORAL     ranolazine 500 MG Tb12  Commonly known as: RANEXA     rosuvastatin 40 MG Tab  Commonly known as: CRESTOR     sucralfate 1 gram tablet  Commonly known as: CARAFATE  Take 1 tablet (1 g total) by mouth 4 (four) times daily before meals and nightly.     XARELTO 2.5 mg Tab  Generic drug: rivaroxaban        STOP taking these medications    amLODIPine 5 MG tablet  Commonly known as: NORVASC     citalopram 10 MG tablet  Commonly known as: CeleXA     hydroCHLOROthiazide 25 MG tablet  Commonly known as: HYDRODIURIL     metOLazone 2.5 MG tablet  Commonly known as: ZAROXOLYN     olmesartan 20 MG tablet  Commonly known as: BENICAR           Where to Get Your Medications      These medications were sent to [id number] - [place], [place] - [address] [facility] [address]  [address] [address]    Phone: [phone]   · furosemide 20 MG tablet          Explained in detail to the patient about the discharge plan, medications, and follow-up visits. Pt understands and agrees with the treatment plan  Discharge Disposition:    Discharged Condition: stable  Diet- Regular, advance as tolerated   Dietary Orders (From admission, onward)     Start     Ordered    06/19/22 1506  Diet clear liquid  (Diet/Nutrition [facility])  Diet effective now         06/19/22 1512               Medications Per DC med rec  Activities as tolerated   Follow-up Information     Tierra Johnson MD Follow up in 2 week(s).    Specialty: Internal Medicine  Why: Tuesday 06/28/2022 at 10am  Contact information:  [address]  [facility] [id number] [phone]             [facility] Follow up.    Contact information:  [address] [place] [address] [phone]                       For further questions contact Dr Johnson's office    Discharge time 33 minutes    For worsening symptoms, chest pain, shortness of breath, increased abdominal pain, high grade fever, stroke or stroke like symptoms, immediately go to the nearest Emergency Room or call 911 as soon as possible.      Tierra nAdrew M.D, on 6/21/2022. at 12:56 PM.

## 2022-06-21 NOTE — PROGRESS NOTES
All information reviewed with pt and spouse, both verbalized understanding and stated they did not have any questions at this time. IV d/c. HH resumed.

## 2022-06-22 NOTE — PROGRESS NOTES
C3 nurse attempted to contact Dee Haji for a TCC post hospital discharge follow up call. The patient is unable to conduct the call @ this time. The patient requested a callback.    The patient has a scheduled HOS appointment with Tierra Johnson MD on 6/28/2022 @ 1000. Message sent to Physician staff.

## 2022-06-23 NOTE — PROGRESS NOTES
C3 nurse spoke with Dee Haji for a TCC post hospital discharge follow up call. The patient has a scheduled HOSFU appointment with Tierra Johnson MD on 6/28/2022 @ 1000

## 2022-06-24 NOTE — PROGRESS NOTES
Population Health Outreach. The following record(s)  below were uploaded for Health Maintenance .    01/14/2022 DEXA SCREENING

## 2022-06-27 NOTE — PROGRESS NOTES
Subjective:      Patient ID: Dee Haji is a 87 y.o. female.    Chief Complaint: Follow-up (Hospital)    Ms. Price is a 87-year-old female in with history of atrial fibrillation, hypertension, CKD, GERD, status post mitral valve replacement with CHF who is here [facility] after her hospital DC for TCM.  She had presented to the ED with generalized abdominal pain with some nausea and vomiting.  No complaint of fever, cough, chest pain.  No dysuria, diarrhea, BRBPR or melanotic stools.  Patient anticoagulated with Plavix  And Xarelto  She also complained of feeling weak and was seen last week in the office with similar complaints.  Always has an elevated borderline BNP.  Blood pressure was on the lower side of normal under 100 systolic range  Home medications were adjusted, Lasix was reduced to 20 mg p.o. daily, metolazone was discontinued as well as the hydrochlorothiazide.      Today she seems to be doing much better with the changes made.  However bilateral lower extremity swelling is starting to get worse again.  Usually in the mornings it is much better.  I will go ahead and add a low dose of the metolazone at 5 mg p.o. daily back to her medication list.  She is advised to continue the Lasix only 20 mg p.o. daily.  Blood pressure still remains on the lower side of normal.  Patient's weakness and fatigue is stemming from the diuretic treatment and low blood pressure as well as unable 82. Sleep well at night.  We discussed giving her a trial of hydroxyzine since patient would like to rest better  She also has some diarrhea with some mucus in her stools    The patient's Health Maintenance was reviewed and the following appears to be due at this time:   There are no preventive care reminders to display for this patient.     Past Medical History:  Past Medical History:   Diagnosis Date    A-fib     CAD (coronary artery disease)     CVA (cerebrovascular accident)     Dyspnea on exertion     GERD  (gastroesophageal reflux disease)     HTN (hypertension)     Mixed hyperlipidemia     S/P AVR (aortic valve replacement)     Synovial cyst of knee     TIA (transient ischemic attack)     Unspecified hemorrhoids     Unspecified osteoarthritis, unspecified site      Past Surgical History:   Procedure Laterality Date    ANGIOGRAM, CORONARY, WITH LEFT HEART CATHETERIZATION      BUNIONECTOMY Right     CATARACT EXTRACTION Right     CHOLECYSTECTOMY      CORONARY STENT PLACEMENT      left lower lobectomy Left      Review of patient's allergies indicates:  No Known Allergies  Social History     Socioeconomic History    Marital status:    Tobacco Use    Smoking status: Former Smoker     Types: Cigarettes     Quit date: 1/3/1972     Years since quittin.5    Smokeless tobacco: Never Used    Tobacco comment: quit in    Substance and Sexual Activity    Alcohol use: Not Currently     Family History   Problem Relation Age of Onset    Heart failure Mother     Cancer Father     Diabetes Sister     Diabetes Brother        Review of Systems   Constitutional: Positive for appetite change.        Alert, Oriented and no acute distress, tired feeling     HENT: Negative for congestion, rhinorrhea, sinus pain and sore throat.    Eyes: Negative for photophobia and discharge.   Respiratory: Negative for cough, chest tightness, shortness of breath and wheezing.    Cardiovascular: Positive for leg swelling. Negative for chest pain and palpitations.   Gastrointestinal: Positive for diarrhea. Negative for abdominal distention, constipation, nausea and vomiting.   Genitourinary: Negative for difficulty urinating, dyspareunia, dysuria, frequency, hematuria and menstrual problem.   Musculoskeletal: Negative for arthralgias, joint swelling, myalgias and neck pain.   Skin: Negative for rash.   Allergic/Immunologic: Negative for immunocompromised state.   Neurological: Negative for dizziness, seizures and weakness.  "  Psychiatric/Behavioral: Negative for agitation and suicidal ideas.       Objective:   /72 (BP Location: Left arm, Patient Position: Sitting, BP Method: Small (Manual))   Pulse 100   Temp 97.6 °F (36.4 °C) (Temporal)   Ht 5' 1" (1.549 m)   Wt 49.1 kg (108 lb 3.2 oz)   LMP  (LMP Unknown)   SpO2 95%   BMI 20.44 kg/m²     Physical Exam  Constitutional:       Appearance: Normal appearance. She is ill-appearing (Appears tired).   HENT:      Head: Normocephalic and atraumatic.      Nose: Nose normal.      Mouth/Throat:      Mouth: Mucous membranes are moist.      Pharynx: Oropharynx is clear.   Eyes:      Extraocular Movements: Extraocular movements intact.      Pupils: Pupils are equal, round, and reactive to light.   Cardiovascular:      Rate and Rhythm: Normal rate and regular rhythm.      Pulses: Normal pulses.   Pulmonary:      Effort: Pulmonary effort is normal.      Breath sounds: Normal breath sounds.   Abdominal:      General: Bowel sounds are normal.      Palpations: Abdomen is soft.   Musculoskeletal:         General: Normal range of motion.      Cervical back: Normal range of motion and neck supple.      Right lower leg: Edema present.      Left lower leg: Edema present.   Skin:     General: Skin is warm.   Neurological:      General: No focal deficit present.      Mental Status: She is alert and oriented to person, place, and time. Mental status is at baseline.   Psychiatric:         Mood and Affect: Mood normal.       Assessment/ Plan:   1. Hospital discharge follow-up  -patient is doing well overall except still continues to feel extremely tired  -swelling of her bilateral lower extremities is coming back, metolazone will be added at 5 mg p.o. daily  -blood pressure still remains on the lower side of normal, continue to hold Benicar and HCTZ  -continue Lasix at 20 mg p.o. daily      2. Paroxysmal atrial fibrillation  -currently normal sinus rhythm    3. S/P MVR (mitral valve " replacement)  -followed by Cardiology    4. RENÉ (acute kidney injury)  -hydration is emphasized, avoid nephrotoxic medications    5. Gastroesophageal reflux disease, unspecified whether esophagitis present  -patient advised to avoid foods that trigger acid reflux and he had at least 2 hours before bedtime.    6. Hyperlipidemia, unspecified hyperlipidemia type  - on rosuvastatin, continue    7. Hypertension, unspecified type  Well-controlled with current meds, continue  Now off of the HCTZ and the Benicar, will discuss this again with Cardiology  Some form of routine aerobic exercises emphasized    8. Arteriosclerosis of coronary artery  -followed by cardiology, no chest pain    9. Transient ischemic attack  -resolved    10. Diarrhea, unspecified type  -adding Flagyl 500 mg p.o. t.i.d. for 5 days  -high-fiber diet    - metroNIDAZOLE (FLAGYL) 500 MG tablet; Take 1 tablet (500 mg total) by mouth 3 (three) times daily.  Dispense: 10 tablet; Refill: 0       No orders of the defined types were placed in this encounter.      Medication List with Changes/Refills   New Medications    HYDROXYZINE HCL (ATARAX) 25 MG TABLET    Take 1 tablet (25 mg total) by mouth every evening.    METOLAZONE (ZAROXOLYN) 2.5 MG TABLET    Take 2 tablets (5 mg total) by mouth once daily.    METRONIDAZOLE (FLAGYL) 500 MG TABLET    Take 1 tablet (500 mg total) by mouth 3 (three) times daily.   Current Medications    ALPHA LIPOIC ACID 200 MG CAP    Take 200 mg by mouth.    CHOLECALCIFEROL, VITAMIN D3, 125 MCG (5,000 UNIT) CAPSULE    Take 5,000 Int'l Units by mouth once daily.    CLOPIDOGREL (PLAVIX) 75 MG TABLET    Take 75 mg by mouth once daily.    DOCUSATE SODIUM (COLACE) 100 MG CAPSULE    Take 100 mg by mouth 2 (two) times daily.    FENOFIBRATE MICRONIZED (LOFIBRA) 134 MG CAP    Take 134 mg by mouth once daily at 6am.    FINERENONE 10 MG TAB    Take 10 mg by mouth once daily at 6am.    FOLIC ACID/MULTIVIT-MIN/LUTEIN (CENTRUM SILVER ORAL)    Take 1  tablet by mouth once daily.    FUROSEMIDE (LASIX) 20 MG TABLET    Take 1 tablet (20 mg total) by mouth once daily.    GINKGO BILOBA 120 MG TAB      0 Refill(s)    NITROGLYCERIN (NITROSTAT) 0.4 MG SL TABLET    Place 0.4 mg under the tongue every 5 (five) minutes as needed. X 3 doses    ONDANSETRON (ZOFRAN) 4 MG TABLET    Take 4 mg by mouth every 4 to 6 hours as needed.    PANTOPRAZOLE (PROTONIX) 40 MG TABLET    Take 1 tablet (40 mg total) by mouth once daily.    POTASSIUM CHLORIDE (K-TAB) 20 MEQ    Take 20 mEq by mouth once daily.    RANOLAZINE (RANEXA) 500 MG TB12    500 mg 2 (two) times daily.    RIVAROXABAN (XARELTO) 2.5 MG TAB    Take 2.5 mg by mouth 2 (two) times daily.    ROSUVASTATIN (CRESTOR) 40 MG TAB    Take 40 mg by mouth once daily.    VIT A/VIT C/VIT E/ZINC/COPPER (PRESERVISION AREDS ORAL)    Take 1 capsule by mouth once daily.   Discontinued Medications    SUCRALFATE (CARAFATE) 1 GRAM TABLET    Take 1 tablet (1 g total) by mouth 4 (four) times daily before meals and nightly.      Medication List with Changes/Refills   New Medications    HYDROXYZINE HCL (ATARAX) 25 MG TABLET    Take 1 tablet (25 mg total) by mouth every evening.       Start Date: 6/28/2022 End Date: --    METOLAZONE (ZAROXOLYN) 2.5 MG TABLET    Take 2 tablets (5 mg total) by mouth once daily.       Start Date: 6/28/2022 End Date: 6/28/2023    METRONIDAZOLE (FLAGYL) 500 MG TABLET    Take 1 tablet (500 mg total) by mouth 3 (three) times daily.       Start Date: 6/28/2022 End Date: --   Current Medications    ALPHA LIPOIC ACID 200 MG CAP    Take 200 mg by mouth.       Start Date: 5/26/2021 End Date: --    CHOLECALCIFEROL, VITAMIN D3, 125 MCG (5,000 UNIT) CAPSULE    Take 5,000 Int'l Units by mouth once daily.       Start Date: 5/26/2021 End Date: --    CLOPIDOGREL (PLAVIX) 75 MG TABLET    Take 75 mg by mouth once daily.       Start Date: 12/10/2021End Date: --    DOCUSATE SODIUM (COLACE) 100 MG CAPSULE    Take 100 mg by mouth 2 (two) times  daily.       Start Date: 4/26/2022 End Date: --    FENOFIBRATE MICRONIZED (LOFIBRA) 134 MG CAP    Take 134 mg by mouth once daily at 6am.       Start Date: 2/20/2022 End Date: --    FINERENONE 10 MG TAB    Take 10 mg by mouth once daily at 6am.       Start Date: 12/10/2021End Date: --    FOLIC ACID/MULTIVIT-MIN/LUTEIN (CENTRUM SILVER ORAL)    Take 1 tablet by mouth once daily.       Start Date: --        End Date: --    FUROSEMIDE (LASIX) 20 MG TABLET    Take 1 tablet (20 mg total) by mouth once daily.       Start Date: 6/21/2022 End Date: --    GINKGO BILOBA 120 MG TAB      0 Refill(s)       Start Date: 5/26/2021 End Date: --    NITROGLYCERIN (NITROSTAT) 0.4 MG SL TABLET    Place 0.4 mg under the tongue every 5 (five) minutes as needed. X 3 doses       Start Date: 12/10/2021End Date: --    ONDANSETRON (ZOFRAN) 4 MG TABLET    Take 4 mg by mouth every 4 to 6 hours as needed.       Start Date: 1/4/2022  End Date: --    PANTOPRAZOLE (PROTONIX) 40 MG TABLET    Take 1 tablet (40 mg total) by mouth once daily.       Start Date: 5/3/2022  End Date: 5/3/2023    POTASSIUM CHLORIDE (K-TAB) 20 MEQ    Take 20 mEq by mouth once daily.       Start Date: 4/12/2022 End Date: --    RANOLAZINE (RANEXA) 500 MG TB12    500 mg 2 (two) times daily.       Start Date: 12/6/2021 End Date: --    RIVAROXABAN (XARELTO) 2.5 MG TAB    Take 2.5 mg by mouth 2 (two) times daily.       Start Date: 12/12/2021End Date: --    ROSUVASTATIN (CRESTOR) 40 MG TAB    Take 40 mg by mouth once daily.       Start Date: 12/22/2021End Date: --    VIT A/VIT C/VIT E/ZINC/COPPER (PRESERVISION AREDS ORAL)    Take 1 capsule by mouth once daily.       Start Date: 5/26/2021 End Date: --   Discontinued Medications    SUCRALFATE (CARAFATE) 1 GRAM TABLET    Take 1 tablet (1 g total) by mouth 4 (four) times daily before meals and nightly.       Start Date: 5/3/2022  End Date: 6/28/2022

## 2022-06-28 PROBLEM — Z09 HOSPITAL DISCHARGE FOLLOW-UP: Status: ACTIVE | Noted: 2022-01-01

## 2022-09-01 NOTE — TELEPHONE ENCOUNTER
Spoke with [facility], medication was sent to the pharmacy, and a GI referral was sent to [facility].

## 2022-09-06 PROBLEM — Z00.00 MEDICARE ANNUAL WELLNESS VISIT, SUBSEQUENT: Status: ACTIVE | Noted: 2022-01-01

## 2022-09-06 PROBLEM — E03.9 HYPOTHYROIDISM: Status: ACTIVE | Noted: 2022-01-01

## 2022-09-06 PROBLEM — E78.2 MIXED HYPERLIPIDEMIA: Status: ACTIVE | Noted: 2022-01-01

## 2022-09-06 NOTE — PROGRESS NOTES
Patient ID: [id number]     Chief Complaint: Medicare AWV (Wellness)      HPI:     Dee Haji is a 87 y.o. female here today for a Medicare Wellness. No other complaints today.   Ms. Price is a 87-year-old female in with history of atrial fibrillation, hypertension, CKD, GERD, status post mitral valve replacement with CHF who is here today for her MCW.  She also complained of feeling weak,  Always has an elevated borderline BNP.  Blood pressure was on the lower side of normal under 100 systolic range  Now again on furosemide and metolazone per Cardiology recommendations of renal indices starting to worsen again.  Patient does have a very labile kidney function which deteriorates when she is on diuretics along with issues with heart failure and volume overload on scaling back on the diuretics.  At this time she is advised to hold the metolazone for 2 days.  We also discussed sending a referral to Nephrology.      A separate  E/M visit was performed for hypothyroidism which is new diagnosis as well as acute on chronic kidney injury  She is also noted to be hypothyroid today and was initiated on levothyroxine at a lower dose, hopefully this will help with her cardiac function.    Also that has been communication via home health and patient does complain of food sticking in her throat with difficulty swallowing.  She is scheduled to see Gastroenterology, referral was sent earlier last week for further evaluation.  In the meantime she is advised to use soft foods and ground meats    Cardiologist: Dr. Payton   GI: Dr. Nunez    ----------------------------  A-fib  CAD (coronary artery disease)  CVA (cerebrovascular accident)  Dyspnea on exertion  GERD (gastroesophageal reflux disease)  HTN (hypertension)  Mixed hyperlipidemia  S/P AVR (aortic valve replacement)  Synovial cyst of knee  TIA (transient ischemic attack)  Unspecified hemorrhoids  Unspecified osteoarthritis, unspecified site     Past Surgical History:    Procedure Laterality Date    ANGIOGRAM, CORONARY, WITH LEFT HEART CATHETERIZATION      BUNIONECTOMY Right     CATARACT EXTRACTION Right     CHOLECYSTECTOMY      CORONARY STENT PLACEMENT      left lower lobectomy Left        Review of patient's allergies indicates:  No Known Allergies    Outpatient Medications Marked as Taking for the 9/6/22 encounter (Office Visit) with Tierra Johnson MD   Medication Sig Dispense Refill    cholecalciferol, vitamin D3, 125 mcg (5,000 unit) capsule Take 5,000 Int'l Units by mouth once daily.      clopidogreL (PLAVIX) 75 mg tablet Take 75 mg by mouth once daily.      docusate sodium (COLACE) 100 MG capsule Take 100 mg by mouth 2 (two) times daily.      fenofibrate micronized (LOFIBRA) 134 MG Cap Take 134 mg by mouth once daily at 6am.      finerenone 10 mg Tab Take 10 mg by mouth once daily at 6am.      folic acid/multivit-min/lutein (CENTRUM SILVER ORAL) Take 1 tablet by mouth once daily.      hydrOXYzine HCL (ATARAX) 25 MG tablet Take 1 tablet (25 mg total) by mouth every evening. 30 tablet 5    metroNIDAZOLE (FLAGYL) 500 MG tablet Take 1 tablet (500 mg total) by mouth 3 (three) times daily. 10 tablet 0    nitroGLYCERIN (NITROSTAT) 0.4 MG SL tablet Place 0.4 mg under the tongue every 5 (five) minutes as needed. X 3 doses      ondansetron (ZOFRAN) 4 MG tablet Take 1 tablet (4 mg total) by mouth every 4 to 6 hours as needed for Nausea. 90 tablet 1    pantoprazole (PROTONIX) 40 MG tablet TAKE 1 TABLET(40 MG) BY MOUTH EVERY DAY 30 tablet 5    potassium chloride (K-TAB) 20 mEq Take 20 mEq by mouth once daily.      ranolazine (RANEXA) 500 MG Tb12 500 mg 2 (two) times daily.      rivaroxaban (XARELTO) 2.5 mg Tab Take 2.5 mg by mouth 2 (two) times daily.      rosuvastatin (CRESTOR) 40 MG Tab Take 40 mg by mouth once daily.      vit A/vit C/vit E/zinc/copper (PRESERVISION AREDS ORAL) Take 1 capsule by mouth once daily.      [DISCONTINUED] furosemide (LASIX) 20 MG tablet Take 1 tablet  (20 mg total) by mouth once daily. 30 tablet 0    [DISCONTINUED] metOLazone (ZAROXOLYN) 2.5 MG tablet Take 2 tablets (5 mg total) by mouth once daily. 60 tablet 11    [DISCONTINUED] sucralfate (CARAFATE) 1 gram tablet Take 1 tablet (1 g total) by mouth 3 (three) times daily. 50 tablet 1       Social History     Socioeconomic History    Marital status:    Tobacco Use    Smoking status: Former     Types: Cigarettes     Quit date: 1/3/1972     Years since quittin.7    Smokeless tobacco: Never    Tobacco comments:     quit in    Substance and Sexual Activity    Alcohol use: Not Currently        Family History   Problem Relation Age of Onset    Heart failure Mother     Cancer Father     Diabetes Sister     Diabetes Brother         Patient Care Team:  Tierra Johnson MD as PCP - General (Internal Medicine)       Subjective:     ROS    A comprehensive review of system is obtained and is essentially negative except for that stated in the HPI  Patient Reported Health Risk Assessment  What is your age?: 80 or older  Are you male or female?: Female  During the past four weeks, how much have you been bothered by emotional problems such as feeling anxious, depressed, irritable, sad, or downhearted and blue?: Not at all  During the past five weeks, has your physical and/or emotional health limited your social activities with family, friends, neighbors, or groups?: Not at all  During the past four weeks, how much bodily pain have you generally had?: No pain  During the past four weeks, was someone available to help if you needed and wanted help?: Yes, as much as I wanted  During the past four weeks, what was the hardest physical activity you could do for at least two minutes?: Very light  Can you get to places out of walking distance without help?  (For example, can you travel alone on buses or taxis, or drive your own car?): Yes  Can you go shopping for groceries or clothes without someone's help?: Yes  Can  you prepare your own meals?: Yes  Can you do your own housework without help?: Yes  Because of any health problems, do you need the help of another person with your personal care needs such as eating, bathing, dressing, or getting around the house?: No  Can you handle your own money without help?: Yes  During the past four weeks, how would you rate your health in general?: Good  How have things been going for you during the past four weeks?: Pretty well  Are you having difficulties driving your car?: No  Do you always fasten your seat belt when you are in a car?: Yes, usually  How often in the past four weeks have you been bothered by falling or dizzy when standing up?: Never  How often in the past four weeks have you been bothered by sexual problems?: Never  How often in the past four weeks have you been bothered by trouble eating well?: Never  How often in the past four weeks have you been bothered by teeth or denture problems?: Never  How often in the past four weeks have you been bothered with problems using the telephone?: Never  How often in the past four weeks have you been bothered by tiredness or fatigue?: Always  Have you fallen two or more times in the past year?: No  Are you afraid of falling?: No  Are you a smoker?: No  During the past four weeks, how many drinks of wine, beer, or other alcoholic beverages did you have?: No alcohol at all  Do you exercise for about 20 minutes three or more days a week?: Yes, some of the time  Have you been given any information to help you with hazards in your house that might hurt you?: No  Have you been given any information to help you with keeping track of your medications?: Yes  How often do you have trouble taking medicines the way you've been told to take them?: I always take them as prescribed  How confident are you that you can control and manage most of your health problems?: Very confident  What is your race? (Check all that apply.):     Objective:  "    /64 (BP Location: Left arm, Patient Position: Sitting, BP Method: Small (Manual))   Temp 97.8 °F (36.6 °C) (Temporal)   Ht 5' 1" (1.549 m)   Wt 45.5 kg (100 lb 6.4 oz)   LMP  (LMP Unknown)   BMI 18.97 kg/m²     Physical Exam  Constitutional:       Appearance: Normal appearance.      Comments: Ten, frail, pleasant   HENT:      Head: Normocephalic and atraumatic.      Nose: Nose normal.      Mouth/Throat:      Mouth: Mucous membranes are moist.      Pharynx: Oropharynx is clear.   Eyes:      Extraocular Movements: Extraocular movements intact.      Pupils: Pupils are equal, round, and reactive to light.   Cardiovascular:      Rate and Rhythm: Normal rate and regular rhythm.      Pulses: Normal pulses.      Heart sounds: Murmur (Holosystolic murmur) heard.   Pulmonary:      Effort: Pulmonary effort is normal.      Breath sounds: Normal breath sounds.   Abdominal:      General: Bowel sounds are normal.      Palpations: Abdomen is soft.   Musculoskeletal:         General: Normal range of motion.      Cervical back: Normal range of motion and neck supple.   Skin:     General: Skin is warm.   Neurological:      General: No focal deficit present.      Mental Status: She is alert and oriented to person, place, and time. Mental status is at baseline.   Psychiatric:         Mood and Affect: Mood normal.         Checklist of Activities of Daily Living 6/13/2022   Bathing Independent   Dressing Independent   Grooming Independent   Oral Care Independent   Toileting Independent   Transferring Independent   Walking Independent   Climbing Stairs Does not do   Eating Independent   Shopping Needs Help   Cooking Independent   Managing Medications Needs Help   Using the Phone Independent   Housework Needs Help   Laundry Independent   Driving Independent   Managing Finances Independent     Fall Risk Assessment - Outpatient 9/6/2022 6/28/2022 6/13/2022 5/3/2022   Mobility Status Ambulatory Ambulatory Ambulatory Ambulatory "   Number of falls 0 1 1 1   Identified as fall risk 0 0 0 0           Depression Screening  Over the past two weeks, has the patient felt down, depressed, or hopeless?: No  Over the past two weeks, has the patient felt little interest or pleasure in doing things?: No  Functional Ability/Safety Screening  Was the patient's timed Up & Go test unsteady or longer than 30 seconds?: No  Does the patient need help with phone, transportation, shopping, preparing meals, housework, laundry, meds, or managing money?: No  Does the patient's home have rugs in the hallway, lack grab bars in the bathroom, lack handrails on the stairs or have poor lighting?: No  Have you noticed any hearing difficulties?: Yes  Cognitive Function (Assessed through direct observation with due consideration of information obtained by way of patient reports and/or concerns raised by family, friends, caretakers, or others)    Does the patient repeat questions/statements in the same day?: No  Does the patient have trouble remembering the date, year, and time?: No  Does the patient have difficulty managing finances?: No  Does the patient have a decreased sense of direction?: No  Assessment:     Problem List Items Addressed This Visit          Cardiac/Vascular    Arteriosclerosis of coronary artery    A-fib    HTN (hypertension)     Well-controlled with current meds, continue   low-sodium diet  Some form of routine aerobic exercises emphasized         S/P MVR (mitral valve replacement)     -followed by Dr. Payton         Dyspnea on exertion       Renal/    Stage 3a chronic kidney disease    Relevant Orders    Ambulatory referral/consult to Nephrology       Endocrine    Hypothyroidism     -initiating patient on Synthroid 50 mcg p.o. daily, check TSH at next visit             GI    Gastroesophageal reflux disease - Primary     -patient advised to avoid foods that trigger acid reflux and he had at least 2 hours before bedtime.  -she is scheduled to see  Gastroenterology and possibly may need an EGD with dilatation, symptoms appear to be consistent with esophageal narrowing/stricture            Other    Medicare annual wellness visit, subsequent     -labs are reviewed all essentially normal except for elevated TSH for which patient was initiated on Levothyroxine  -patient is advised on importance of watching her carbohydrate intake and saturated fat intake, making the right nutritional choices and exercising on a regular basis  -up-to-date with the screening            Plan:     Medicare Annual Wellness and Personalized Prevention Plan:   Fall Risk + Home Safety + Hearing Impairment + Depression Screen + Cognitive Impairment Screen + Health Risk Assessment all reviewed.       Health Maintenance Topics with due status: Not Due       Topic Last Completion Date    Aspirin/Antiplatelet Therapy 09/06/2022    Lipid Panel 09/06/2022      The patient's Health Maintenance was reviewed and the following appears to be due at this time:   Health Maintenance Due   Topic Date Due    Influenza Vaccine (1) 09/01/2022         Advance Care Planning   I attest to discussing Advance Care Planning with patient and family member.  Education was provided including the importance of  [place] documentation.  The patient expressed understanding to the importance of this information and discussion.  Goals of Care: The patient expressed that what is most important right now is to focus on:  Comfort based with no aggressive measures of long-term recovery not deemed possible  Length of ACP conversation in minutes: 15  minutes          Medication List with Changes/Refills   Current Medications    AMLODIPINE (NORVASC) 5 MG TABLET    Take 5 mg by mouth once daily.       Start Date: 7/26/2022 End Date: --    CHOLECALCIFEROL, VITAMIN D3, 125 MCG (5,000 UNIT) CAPSULE    Take 5,000 Int'l Units by mouth once daily.       Start Date: 5/26/2021 End Date: --    CLOPIDOGREL (PLAVIX) 75 MG TABLET    Take 75  mg by mouth once daily.       Start Date: 12/10/2021End Date: --    DOCUSATE SODIUM (COLACE) 100 MG CAPSULE    Take 100 mg by mouth 2 (two) times daily.       Start Date: 4/26/2022 End Date: --    FENOFIBRATE MICRONIZED (LOFIBRA) 134 MG CAP    Take 134 mg by mouth once daily at 6am.       Start Date: 2/20/2022 End Date: --    FINERENONE 10 MG TAB    Take 10 mg by mouth once daily at 6am.       Start Date: 12/10/2021End Date: --    FOLIC ACID/MULTIVIT-MIN/LUTEIN (CENTRUM SILVER ORAL)    Take 1 tablet by mouth once daily.       Start Date: --        End Date: --    HYDROXYZINE HCL (ATARAX) 25 MG TABLET    Take 1 tablet (25 mg total) by mouth every evening.       Start Date: 6/28/2022 End Date: --    METRONIDAZOLE (FLAGYL) 500 MG TABLET    Take 1 tablet (500 mg total) by mouth 3 (three) times daily.       Start Date: 6/28/2022 End Date: --    NITROGLYCERIN (NITROSTAT) 0.4 MG SL TABLET    Place 0.4 mg under the tongue every 5 (five) minutes as needed. X 3 doses       Start Date: 12/10/2021End Date: --    ONDANSETRON (ZOFRAN) 4 MG TABLET    Take 1 tablet (4 mg total) by mouth every 4 to 6 hours as needed for Nausea.       Start Date: 8/17/2022 End Date: --    PANTOPRAZOLE (PROTONIX) 40 MG TABLET    TAKE 1 TABLET(40 MG) BY MOUTH EVERY DAY       Start Date: 9/6/2022  End Date: --    POTASSIUM CHLORIDE (K-TAB) 20 MEQ    Take 20 mEq by mouth once daily.       Start Date: 4/12/2022 End Date: --    RANOLAZINE (RANEXA) 500 MG TB12    500 mg 2 (two) times daily.       Start Date: 12/6/2021 End Date: --    RIVAROXABAN (XARELTO) 2.5 MG TAB    Take 2.5 mg by mouth 2 (two) times daily.       Start Date: 12/12/2021End Date: --    ROSUVASTATIN (CRESTOR) 40 MG TAB    Take 40 mg by mouth once daily.       Start Date: 12/22/2021End Date: --    VIT A/VIT C/VIT E/ZINC/COPPER (PRESERVISION AREDS ORAL)    Take 1 capsule by mouth once daily.       Start Date: 5/26/2021 End Date: --   Changed and/or Refilled Medications    Modified Medication  Previous Medication    FUROSEMIDE (LASIX) 20 MG TABLET furosemide (LASIX) 20 MG tablet       Take 1 tablet (20 mg total) by mouth once daily.    Take 1 tablet (20 mg total) by mouth once daily.       Start Date: 9/6/2022  End Date: --    Start Date: 6/21/2022 End Date: 9/6/2022    METOLAZONE (ZAROXOLYN) 2.5 MG TABLET metOLazone (ZAROXOLYN) 2.5 MG tablet       Take 2 tablets (5 mg total) by mouth once daily.    Take 2 tablets (5 mg total) by mouth once daily.       Start Date: 9/6/2022  End Date: 9/6/2023    Start Date: 6/28/2022 End Date: 9/6/2022    SUCRALFATE (CARAFATE) 1 GRAM TABLET sucralfate (CARAFATE) 1 gram tablet       Take 1 tablet (1 g total) by mouth 3 (three) times daily.    Take 1 tablet (1 g total) by mouth 3 (three) times daily.       Start Date: 9/6/2022  End Date: --    Start Date: 8/5/2022  End Date: 9/6/2022   Discontinued Medications    ALPHA LIPOIC ACID 200 MG CAP    Take 200 mg by mouth.       Start Date: 5/26/2021 End Date: 9/6/2022        Follow up in about 4 months (around 1/6/2023) for CHD, CKD. In addition to their scheduled follow up, the patient has also been instructed to follow up on as needed basis.

## 2022-09-06 NOTE — ASSESSMENT & PLAN NOTE
-labs are reviewed all essentially normal except for elevated TSH for which patient was initiated on Levothyroxine  -patient is advised on importance of watching her carbohydrate intake and saturated fat intake, making the right nutritional choices and exercising on a regular basis  -up-to-date with the screening

## 2022-09-06 NOTE — ASSESSMENT & PLAN NOTE
-patient advised to avoid foods that trigger acid reflux and he had at least 2 hours before bedtime.  -she is scheduled to see Gastroenterology and possibly may need an EGD with dilatation, symptoms appear to be consistent with esophageal narrowing/stricture

## 2022-09-13 NOTE — PROGRESS NOTES
[facility] New Referral Office Note    HPI:  Dee Haji 88 y.o. female  has a past medical history of A-fib, CAD (coronary artery disease), CVA (cerebrovascular accident), Dyspnea on exertion, GERD (gastroesophageal reflux disease), HTN (hypertension), Mixed hyperlipidemia, S/P AVR (aortic valve replacement), Synovial cyst of knee, TIA (transient ischemic attack), Unspecified hemorrhoids, and Unspecified osteoarthritis, unspecified site.. Dee Haji was referred to us by Dr. Johnson and presents to office as a new patient for elevated Creatinine. Cr is up to 3.0 this month. Records date back to 2017 with creatinine 1.5-2.5.    Last angiogram was last year. She sees Dr. Antony Payton. She has had aortic and mitral valve replacements.    She takes lasix and reports being SOB and increase in swelling without it.   She is also on metolazone 5 mg BID    Patient denies taking NSAIDs, new antibiotics or recreational drugs. Denies recent episode of dehydration, diarrhea, vomiting, acute illness, hospitalization, recent angiograms or exposure to IV radiocontrast.         Medical History:   Past Medical History:   Diagnosis Date    A-fib     CAD (coronary artery disease)     CVA (cerebrovascular accident)     Dyspnea on exertion     GERD (gastroesophageal reflux disease)     HTN (hypertension)     Mixed hyperlipidemia     S/P AVR (aortic valve replacement)     Synovial cyst of knee     TIA (transient ischemic attack)     Unspecified hemorrhoids     Unspecified osteoarthritis, unspecified site        Surgical History:   Past Surgical History:   Procedure Laterality Date    ANGIOGRAM, CORONARY, WITH LEFT HEART CATHETERIZATION      BUNIONECTOMY Right     CATARACT EXTRACTION Right     CHOLECYSTECTOMY      CORONARY STENT PLACEMENT      left lower lobectomy Left        Family History:   Family History   Problem Relation Age of Onset    Heart failure Mother     Cancer Father     Diabetes Sister     Diabetes  Brother    .     Social History:   Social History     Tobacco Use    Smoking status: Former     Types: Cigarettes     Quit date: 1/3/1972     Years since quittin.7    Smokeless tobacco: Never    Tobacco comments:     quit in    Substance Use Topics    Alcohol use: Not Currently       Allergies:  Review of patient's allergies indicates:  No Known Allergies    Review of Systems:  Constitutional: ++fatigue, occ generalized weakness,   Skin: Denies wounds, no rashes, no itching, no new skin lesions  HEENT: Denies acute change in hearing or vision, tinnitus, or dysphagia  Respiratory:  Denies cough, shortness of breath, or wheezing  Cardiovascular: Denies chest pain, palpitations, or swelling  Gastrointestional: Denies abdominal pain, nausea, vomiting, diarrhea, or constipation  Genitourinary: Denies dysuria, hematuria, or incontinence; reports able to empty bladder  Musculoskeletal: ++generalized arthralgias and back pain  Neurological: Denies headaches, seizures, dizziness, paresthesias or weakness  Hematological: Denies unusual bruising or bleeding  Psychiatric: Denies hallucinations, depression, or confusion      Medications:    Current Outpatient Medications:     cholecalciferol, vitamin D3, 125 mcg (5,000 unit) capsule, Take 5,000 Int'l Units by mouth once daily., Disp: , Rfl:     clopidogreL (PLAVIX) 75 mg tablet, Take 75 mg by mouth once daily., Disp: , Rfl:     docusate sodium (COLACE) 100 MG capsule, Take 100 mg by mouth 2 (two) times daily., Disp: , Rfl:     fenofibrate micronized (LOFIBRA) 134 MG Cap, Take 134 mg by mouth once daily at 6am., Disp: , Rfl:     finerenone 10 mg Tab, Take 10 mg by mouth once daily at 6am., Disp: , Rfl:     folic acid/multivit-min/lutein (CENTRUM SILVER ORAL), Take 1 tablet by mouth once daily., Disp: , Rfl:     furosemide (LASIX) 20 MG tablet, Take 1 tablet (20 mg total) by mouth once daily., Disp: 90 tablet, Rfl: 3    hydrOXYzine HCL (ATARAX) 25 MG tablet, Take 1 tablet  "(25 mg total) by mouth every evening., Disp: 30 tablet, Rfl: 5    levothyroxine (SYNTHROID) 50 MCG tablet, Take 1 tablet (50 mcg total) by mouth before breakfast., Disp: 30 tablet, Rfl: 11    metOLazone (ZAROXOLYN) 2.5 MG tablet, Take 2 tablets (5 mg total) by mouth once daily., Disp: 180 tablet, Rfl: 3    nitroGLYCERIN (NITROSTAT) 0.4 MG SL tablet, Place 0.4 mg under the tongue every 5 (five) minutes as needed. X 3 doses, Disp: , Rfl:     ondansetron (ZOFRAN) 4 MG tablet, Take 1 tablet (4 mg total) by mouth every 4 to 6 hours as needed for Nausea., Disp: 90 tablet, Rfl: 1    pantoprazole (PROTONIX) 40 MG tablet, TAKE 1 TABLET(40 MG) BY MOUTH EVERY DAY, Disp: 30 tablet, Rfl: 5    potassium chloride (K-TAB) 20 mEq, Take 20 mEq by mouth once daily., Disp: , Rfl:     ranolazine (RANEXA) 500 MG Tb12, 500 mg 2 (two) times daily., Disp: , Rfl:     rivaroxaban (XARELTO) 2.5 mg Tab, Take 2.5 mg by mouth 2 (two) times daily., Disp: , Rfl:     rosuvastatin (CRESTOR) 40 MG Tab, Take 40 mg by mouth once daily., Disp: , Rfl:     sucralfate (CARAFATE) 1 gram tablet, Take 1 tablet (1 g total) by mouth 3 (three) times daily., Disp: 270 tablet, Rfl: 3    vit A/vit C/vit E/zinc/copper (PRESERVISION AREDS ORAL), Take 1 capsule by mouth once daily., Disp: , Rfl:        Vitals:  /70 (BP Location: Left arm, Patient Position: Sitting)   Pulse 61   Temp 97.5 °F (36.4 °C) (Oral)   Resp 20   Ht 5' 1" (1.549 m)   Wt 44.7 kg (98 lb 8.7 oz)   LMP  (LMP Unknown)   SpO2 97%   BMI 18.62 kg/m²  Body mass index is 18.62 kg/m².    Physical Exam:  General: no acute distress, awake, alert  Eyes: PERRLA, EOMI, conjunctiva clear, eyelids without swelling  HENT: atraumatic, oropharynx and nasal mucosa patent  Neck: full ROM, no JVD, no thyromegaly or lymphadenopathy  Respiratory: equal, unlabored, clear to auscultation A/P  Cardiovascular: RRR; +3/6  LSB, no rub; BL radial and pedal pulses felt  Edema: tr  Gastrointestinal: soft, non-tender, " non-distended; positive bowel sounds; no masses to palpation  Genitourinary: no CVA tenderness upon palpation  Musculoskeletal: full ROM without limitation or discomfort  Integumentary: warm, dry; no rashes, wounds, or skin lesions  Neurological: oriented, appropriate, no acute deficits      Labs:        Component Value Date/Time     09/06/2022 0944     06/21/2022 1314    K 4.9 09/06/2022 0944    K 3.1 (L) 06/21/2022 1314    CO2 34 (H) 09/06/2022 0944    CO2 26 06/21/2022 1314    BUN 58.5 (H) 09/06/2022 0944    BUN 27.8 (H) 06/21/2022 1314    CREATININE 3.08 (H) 09/06/2022 0944    CREATININE 1.28 (H) 06/21/2022 1314    CALCIUM 9.9 09/06/2022 0944    CALCIUM 8.5 06/21/2022 1314            Component Value Date/Time    WBC 5.8 09/06/2022 0944    WBC 7.3 06/20/2022 0215    HGB 9.8 (L) 09/06/2022 0944    HGB 10.2 (L) 06/20/2022 0215    HCT 34.8 (L) 09/06/2022 0944    HCT 34.2 (L) 06/20/2022 0215     09/06/2022 0944     06/20/2022 0215       Impression:    Patient Active Problem List   Diagnosis    Arteriosclerosis of coronary artery    Peripheral vascular disease, unspecified    Stage 3a chronic kidney disease    A-fib    Gastroesophageal reflux disease    Mixed hyperlipidemia    Transient ischemic attack    HTN (hypertension)    S/P MVR (mitral valve replacement)    Dyspnea on exertion    Valvular heart disease    RENÉ (acute kidney injury)    Hospital discharge follow-up    Medicare annual wellness visit, subsequent    Hypothyroidism     Cardiorenal disease, ( AS), renal function fluctuates with CO and diuresis  Worsening renal function ? Decrease preload effect   Aldosterone antagonist (finerenone) can cause hyperkalemia  Metabolic alkalosis    Plan:  DC potassium   DC metolazone    Labs on Monday  US on Monday  FU on Tuesday    Avoid NSAIDs (Aleve, Mobic, Celebrex, Ibuprofen, Advil, Toradol and Diclofenac).    Antonio Almeida

## 2022-09-20 NOTE — PROGRESS NOTES
[facility] Progress Note      HPI  Dee Haji, 88 y.o. female,  has a past medical history of A-fib, CAD (coronary artery disease), CVA (cerebrovascular accident), Dyspnea on exertion, GERD (gastroesophageal reflux disease), HTN (hypertension), Mixed hyperlipidemia, S/P AVR (aortic valve replacement), Synovial cyst of knee, TIA (transient ischemic attack), Unspecified hemorrhoids, and Unspecified osteoarthritis, unspecified site. Dee Haji presents to office for a 1 week follow up visit for cardiorenal disease, hyperkalemia, metabolic alkalosis, and elevated creatinine of 3.0 Labs this week show Cr down to 1.8 after DC metolazone.  Overall today she feels tired, but not SOB. She reports less swelling in her feet.    Retroperitoneal US Impression: Bilateral renal cyst      Patient denies taking NSAIDs, new antibiotics or recreational drugs. Denies recent episode of dehydration, diarrhea, vomiting, acute illness, hospitalization, recent angiograms or exposure to IV radiocontrast.      Medical History:   Past Medical History:   Diagnosis Date    A-fib     CAD (coronary artery disease)     CVA (cerebrovascular accident)     Dyspnea on exertion     GERD (gastroesophageal reflux disease)     HTN (hypertension)     Mixed hyperlipidemia     S/P AVR (aortic valve replacement)     Synovial cyst of knee     TIA (transient ischemic attack)     Unspecified hemorrhoids     Unspecified osteoarthritis, unspecified site        Surgical History:   Past Surgical History:   Procedure Laterality Date    ANGIOGRAM, CORONARY, WITH LEFT HEART CATHETERIZATION      BUNIONECTOMY Right     CATARACT EXTRACTION Right     CHOLECYSTECTOMY      CORONARY STENT PLACEMENT      left lower lobectomy Left        Family History:   Family History   Problem Relation Age of Onset    Heart failure Mother     Cancer Father     Diabetes Sister     Diabetes Brother        Social History:   Social History     Tobacco Use    Smoking  status: Former     Types: Cigarettes     Quit date: 1/3/1972     Years since quittin.7    Smokeless tobacco: Never    Tobacco comments:     quit in    Substance Use Topics    Alcohol use: Not Currently       Allergies:  Review of patient's allergies indicates:  No Known Allergies    Medications:    Current Outpatient Medications:     cholecalciferol, vitamin D3, 125 mcg (5,000 unit) capsule, Take 5,000 Int'l Units by mouth once daily., Disp: , Rfl:     clopidogreL (PLAVIX) 75 mg tablet, Take 75 mg by mouth once daily., Disp: , Rfl:     docusate sodium (COLACE) 100 MG capsule, Take 100 mg by mouth 2 (two) times daily., Disp: , Rfl:     fenofibrate micronized (LOFIBRA) 134 MG Cap, Take 134 mg by mouth once daily at 6am., Disp: , Rfl:     finerenone 10 mg Tab, Take 10 mg by mouth once daily at 6am., Disp: , Rfl:     folic acid/multivit-min/lutein (CENTRUM SILVER ORAL), Take 1 tablet by mouth once daily., Disp: , Rfl:     furosemide (LASIX) 20 MG tablet, Take 1 tablet (20 mg total) by mouth once daily., Disp: 90 tablet, Rfl: 3    hydrOXYzine HCL (ATARAX) 25 MG tablet, Take 1 tablet (25 mg total) by mouth every evening., Disp: 30 tablet, Rfl: 5    levothyroxine (SYNTHROID) 50 MCG tablet, Take 1 tablet (50 mcg total) by mouth before breakfast., Disp: 30 tablet, Rfl: 11    nitroGLYCERIN (NITROSTAT) 0.4 MG SL tablet, Place 0.4 mg under the tongue every 5 (five) minutes as needed. X 3 doses, Disp: , Rfl:     ondansetron (ZOFRAN) 4 MG tablet, Take 1 tablet (4 mg total) by mouth every 4 to 6 hours as needed for Nausea., Disp: 90 tablet, Rfl: 1    pantoprazole (PROTONIX) 40 MG tablet, TAKE 1 TABLET(40 MG) BY MOUTH EVERY DAY, Disp: 30 tablet, Rfl: 5    ranolazine (RANEXA) 500 MG Tb12, 500 mg 2 (two) times daily., Disp: , Rfl:     rivaroxaban (XARELTO) 2.5 mg Tab, Take 2.5 mg by mouth 2 (two) times daily., Disp: , Rfl:     rosuvastatin (CRESTOR) 40 MG Tab, Take 40 mg by mouth once daily., Disp: , Rfl:     sucralfate  (CARAFATE) 1 gram tablet, Take 1 tablet (1 g total) by mouth 3 (three) times daily., Disp: 270 tablet, Rfl: 3    vit A/vit C/vit E/zinc/copper (PRESERVISION AREDS ORAL), Take 1 capsule by mouth once daily., Disp: , Rfl:        ROS:    Constitutional: Denies fever; +fatigue;  denies generalized weakness, night sweats, or acute weight change  Skin: Denies wounds, no rashes, no itching, no new skin lesions  HEENT: Denies acute change in hearing or vision, tinnitus, or dysphagia  Respiratory:  Denies cough, shortness of breath, or wheezing  Cardiovascular: Denies chest pain, palpitations,; less swelling  Gastrointestional: Denies abdominal pain, nausea, vomiting, diarrhea, or constipation  Genitourinary: Denies dysuria, hematuria, or incontinence; reports able to empty bladder  Musculoskeletal: Denies myalgias, back pain, decreased ROM or focal weakness  Neurological: Denies headaches, seizures, dizziness, paresthesias or weakness  Hematological: Denies unusual bruising or bleeding  Psychiatric: Denies hallucinations, depression, or confusion      Vital Signs:     Vitals:    09/20/22 1039   BP: 112/74   Pulse: 63   Resp: 20   Temp: 97.5 °F (36.4 °C)     Body mass index is 19.16 kg/m².        Physical Exam:    General: no acute distress, awake, alert  Eyes: PERRLA, EOMI, conjunctiva clear, eyelids without swelling  HENT: atraumatic, oropharynx and nasal mucosa patent  Neck: full ROM, no JVD, no thyromegaly or lymphadenopathy  Respiratory: equal, unlabored, crackles at bases to auscultation  Cardiovascular: RRR ; +RANDALL G3/6; no rub; radial and pedal pulses felt  Edema: trace  Gastrointestinal: soft, non-tender, non-distended; positive bowel sounds; no masses to palpation  Musculoskeletal: full ROM without limitation or discomfort  Integumentary: warm, dry; no rashes, wounds, or skin lesions  Neurological: oriented, appropriate, no acute deficits      Labs:        Component Value Date/Time     09/19/2022 1421    NA  140 09/06/2022 0944    K 5.2 (H) 09/19/2022 1421    K 4.9 09/06/2022 0944    CO2 29 09/19/2022 1421    CO2 34 (H) 09/06/2022 0944    BUN 48.7 (H) 09/19/2022 1421    BUN 58.5 (H) 09/06/2022 0944    CREATININE 1.84 (H) 09/19/2022 1421    CREATININE 3.08 (H) 09/06/2022 0944    EGFRNONAA 42 06/21/2022 1314    EGFRNONAA 22 06/20/2022 0215    CALCIUM 10.3 (H) 09/19/2022 1421    CALCIUM 9.9 09/06/2022 0944    PHOS 3.7 09/19/2022 1421    .0 (H) 09/19/2022 1421            Component Value Date/Time    WBC 9.0 09/19/2022 1421    WBC 5.8 09/06/2022 0944    HGB 9.7 (L) 09/19/2022 1421    HGB 9.8 (L) 09/06/2022 0944    HCT 35.0 (L) 09/19/2022 1421    HCT 34.8 (L) 09/06/2022 0944     09/19/2022 1421     09/06/2022 0944         Impression:    Patient Active Problem List   Diagnosis    Arteriosclerosis of coronary artery    Peripheral vascular disease, unspecified    Stage 3a chronic kidney disease    A-fib    Gastroesophageal reflux disease    Mixed hyperlipidemia    Transient ischemic attack    HTN (hypertension)    S/P MVR (mitral valve replacement)    Dyspnea on exertion    Valvular heart disease    RENÉ (acute kidney injury)    Hospital discharge follow-up    Medicare annual wellness visit, subsequent    Hypothyroidism     1. Cardiorenal disease        2. Worsening renal function        3. Metabolic alkalosis          Metabolic alkalosis: improved  Hyperkalemia remains; DC finerenone  Baseline [id number] R/T nephrosclerosis and cardiorenal disease: renal indices fluctuate with cardiac output and diuresis; renal function much improved    Plan:  Rx: Torsemide 40 mg daily    Stop taking finerenone    Stop taking lasix    Low sodium diet    Labs in 2 weeks  FU in 3 weeks    Avoid NSAIDs (Aleve, Mobic, Celebrex, Ibuprofen, Advil, Toradol and Diclofenac).       ANA LAURA Vazquez

## 2022-09-30 NOTE — TELEPHONE ENCOUNTER
Spoke with  he is aware and understands that the CT is normal  is requesting a screening for bladder.

## 2022-09-30 NOTE — TELEPHONE ENCOUNTER
Pts  Sanjay called in regards to CT results. Pt was in the hospital on 9/27 for gallstones. They did not receive the results would like a call back with results.   Callback Number: [phone]

## 2022-09-30 NOTE — TELEPHONE ENCOUNTER
Spoke to the patient's .  Upon reviewing of the CT and the 2 mm calculi on the right side without any hydroureter nephrosis or any other signs of obstruction, she was given a prescription for Flomax, acetaminophen with codeine and was advised on hydration.  Also Zofran prescription was given.  At this time is TLC is doing well and has no complaints of pain.  The patient's  is asking for a seave for her to empty her bladder so that she will know when she passes the stone.  The apparently forgot to give it to her from the hospital.  Advised that he could get it at any pharmacy.

## 2022-09-30 NOTE — TELEPHONE ENCOUNTER
Please notify patient's  that I have reviewed her CT of the abdomen and pelvis and there is no acute intra-abdominal abnormality noted.  In short her CT scan was normal. , thank you

## 2022-10-03 PROBLEM — Z09 HOSPITAL DISCHARGE FOLLOW-UP: Status: RESOLVED | Noted: 2022-01-01 | Resolved: 2022-01-01

## 2022-10-10 NOTE — PROGRESS NOTES
[facility] [place] [facility]      HPI  Dee Haji, 88 y.o. female,  has a past medical history of A-fib, CAD (coronary artery disease), CVA (cerebrovascular accident), Dyspnea on exertion, GERD (gastroesophageal reflux disease), HTN (hypertension), Mixed hyperlipidemia, S/P AVR (aortic valve replacement), Synovial cyst of knee, TIA (transient ischemic attack), Unspecified hemorrhoids, and Unspecified osteoarthritis, unspecified site. Dee Haji presents to office for a follow up visit for Metabolic alkalosis, hyperkalemia, and [id number]. Last visit we started Torsemide 40 mg daily and Dc'd finerenone and lasix.    Recent ER visit for gastroenteritis (n/v/d)  Had mild hypokalemia, replaced in ER  CT abdomen:  KIDNEYS/URETERS: Incidental renal cysts.  No follow-up imaging is recommended as incidental lesions are likely benign. Indeterminate 2.3 cm lesion at the lateral right kidney has internal attenuation of 43 Hounsfield units, unchanged in size from prior exam. This lesion was previously characterized on sonogram 09/19/2022. Correlate with those results. Nonobstructing right nephrolithiasis.    No acute intraabdominal findings.    C/o occasional REDDING, no worsening leg edema    Patient denies taking NSAIDs, new antibiotics or recreational drugs. Denies recent angiograms or exposure to IV radiocontrast.      Medical History:   Past Medical History:   Diagnosis Date    A-fib     CAD (coronary artery disease)     CVA (cerebrovascular accident)     Dyspnea on exertion     GERD (gastroesophageal reflux disease)     HTN (hypertension)     Mixed hyperlipidemia     S/P AVR (aortic valve replacement)     Synovial cyst of knee     TIA (transient ischemic attack)     Unspecified hemorrhoids     Unspecified osteoarthritis, unspecified site        Surgical History:   Past Surgical History:   Procedure Laterality Date    ANGIOGRAM, CORONARY, WITH LEFT HEART CATHETERIZATION      BUNIONECTOMY Right      CATARACT EXTRACTION Right     CHOLECYSTECTOMY      CORONARY STENT PLACEMENT      left lower lobectomy Left        Family History:   Family History   Problem Relation Age of Onset    Heart failure Mother     Cancer Father     Diabetes Sister     Diabetes Brother        Social History:   Social History     Tobacco Use    Smoking status: Former     Types: Cigarettes     Quit date: 1/3/1972     Years since quittin.8    Smokeless tobacco: Never    Tobacco comments:     quit in    Substance Use Topics    Alcohol use: Not Currently       Allergies:  Review of patient's allergies indicates:  No Known Allergies    Medications:    Current Outpatient Medications:     cholecalciferol, vitamin D3, 125 mcg (5,000 unit) capsule, Take 5,000 Int'l Units by mouth once daily., Disp: , Rfl:     clopidogreL (PLAVIX) 75 mg tablet, Take 75 mg by mouth once daily., Disp: , Rfl:     docusate sodium (COLACE) 100 MG capsule, Take 100 mg by mouth 2 (two) times daily., Disp: , Rfl:     fenofibrate micronized (LOFIBRA) 134 MG Cap, Take 134 mg by mouth once daily at 6am., Disp: , Rfl:     folic acid/multivit-min/lutein (CENTRUM SILVER ORAL), Take 1 tablet by mouth once daily., Disp: , Rfl:     hydrOXYzine HCL (ATARAX) 25 MG tablet, Take 1 tablet (25 mg total) by mouth every evening., Disp: 30 tablet, Rfl: 5    levothyroxine (SYNTHROID) 50 MCG tablet, Take 1 tablet (50 mcg total) by mouth before breakfast., Disp: 30 tablet, Rfl: 11    nitroGLYCERIN (NITROSTAT) 0.4 MG SL tablet, Place 0.4 mg under the tongue every 5 (five) minutes as needed. X 3 doses, Disp: , Rfl:     ondansetron (ZOFRAN) 4 MG tablet, Take 1 tablet (4 mg total) by mouth every 4 to 6 hours as needed for Nausea., Disp: 90 tablet, Rfl: 1    ondansetron (ZOFRAN) 4 MG tablet, Take 1 tablet (4 mg total) by mouth every 6 (six) hours as needed for Nausea., Disp: 24 tablet, Rfl: 0    pantoprazole (PROTONIX) 40 MG tablet, TAKE 1 TABLET(40 MG) BY MOUTH EVERY DAY, Disp: 30 tablet,  Rfl: 5    ranolazine (RANEXA) 500 MG Tb12, 500 mg 2 (two) times daily., Disp: , Rfl:     rivaroxaban (XARELTO) 2.5 mg Tab, Take 2.5 mg by mouth 2 (two) times daily., Disp: , Rfl:     rosuvastatin (CRESTOR) 40 MG Tab, Take 40 mg by mouth once daily., Disp: , Rfl:     sucralfate (CARAFATE) 1 gram tablet, Take 1 tablet (1 g total) by mouth 3 (three) times daily., Disp: 270 tablet, Rfl: 3    tamsulosin (FLOMAX) 0.4 mg Cap, Take 1 capsule (0.4 mg total) by mouth once daily. for 14 days, Disp: 14 capsule, Rfl: 0    torsemide (DEMADEX) 20 MG Tab, Take 2 tablets (40 mg total) by mouth once daily., Disp: 60 tablet, Rfl: 11    vit A/vit C/vit E/zinc/copper (PRESERVISION AREDS ORAL), Take 1 capsule by mouth once daily., Disp: , Rfl:        ROS:    Constitutional: Denies fever, fatigue, generalized weakness, night sweats, or acute weight change  Skin: Denies wounds, no rashes, no itching, no new skin lesions  HEENT: Denies acute change in hearing or vision, tinnitus, or dysphagia  Respiratory:  Denies cough or wheezing; +REDDING at times  Cardiovascular: Denies chest pain, palpitations; + edema   Gastrointestional: Denies abdominal pain, nausea, vomiting, diarrhea, or constipation  Genitourinary: Denies dysuria, hematuria, or incontinence; reports able to empty bladder  Musculoskeletal: Denies myalgias, back pain, decreased ROM or focal weakness  Neurological: Denies headaches, seizures, dizziness, paresthesias or weakness  Hematological: Denies unusual bruising or bleeding  Psychiatric: Denies hallucinations, depression, or confusion      Vital Signs:  Vitals:    10/10/22 1013   BP: 104/70   Pulse: 88   Resp: 20   Temp: 97.9 °F (36.6 °C)     Body mass index is 19.15 kg/m².        Physical Exam:    General: no acute distress, awake, alert  Eyes: PERRLA, EOMI, conjunctiva clear, eyelids without swelling  HENT: atraumatic, oropharynx and nasal mucosa patent  Neck: full ROM, no JVD, no thyromegaly or lymphadenopathy  Respiratory:  equal, unlabored, clear to auscultation A/P  Cardiovascular: irr irr, ++  musical  RANDALL G3/6 LSB o; radial and pedal pulses weakly felt  Edema: trace- +1  Gastrointestinal: soft, non-tender, non-distended; positive bowel sounds; no masses to palpation  Musculoskeletal: ROM without new  limitation or discomfort  Integumentary: warm, dry; no new  skin lesions  Neurological: oriented, appropriate, no acute focal deficits      Labs:        Component Value Date/Time     10/03/2022 1500     09/27/2022 2327    K 4.4 10/03/2022 1500    K 3.2 (L) 09/27/2022 2327    CO2 29 10/03/2022 1500    CO2 29 09/27/2022 2327    BUN 47.7 (H) 10/03/2022 1500    BUN 59.8 (H) 09/27/2022 2327    CREATININE 1.89 (H) 10/03/2022 1500    CREATININE 2.40 (H) 09/27/2022 2327    EGFRNONAA 42 06/21/2022 1314    EGFRNONAA 22 06/20/2022 0215    CALCIUM 10.0 10/03/2022 1500    CALCIUM 9.5 09/27/2022 2327    PHOS 3.7 09/19/2022 1421    .0 (H) 09/19/2022 1421            Component Value Date/Time    WBC 7.7 10/03/2022 1500    WBC 11.3 09/27/2022 2327    HGB 9.2 (L) 10/03/2022 1500    HGB 9.2 (L) 09/27/2022 2327    HCT 32.1 (L) 10/03/2022 1500    HCT 31.0 (L) 09/27/2022 2327     10/03/2022 1500     09/27/2022 2327         Impression:    Patient Active Problem List   Diagnosis    Arteriosclerosis of coronary artery    Peripheral vascular disease, unspecified    Stage 3a chronic kidney disease    A-fib    Gastroesophageal reflux disease    Mixed hyperlipidemia    Transient ischemic attack    HTN (hypertension)    S/P MVR (mitral valve replacement)    Dyspnea on exertion    Valvular heart disease    RENÉ (acute kidney injury)    Medicare annual wellness visit, subsequent    Hypothyroidism     1. Stage 3a chronic kidney disease        2. Hypertension, unspecified type        3. Cardiorenal disease              Hyperkalemia: resolved  [id number] secondary to nephrosclerosis and cardiorenal disease: renal indices fluctuate with  cardiac output and diuresis  Renal function stable, with some improvement  No significant proteinuria  Nonobstructing right nephrolithiasis.  Cardiorenal disease    Plan:  Labs in 1 month: will call her for any abnormal results  FU in 3 months with labs 1 week before    Avoid NSAIDs (Aleve, Mobic, Celebrex, Ibuprofen, Advil, Toradol and Diclofenac).       ANA LAURA Vazquez MD

## 2022-10-20 NOTE — TELEPHONE ENCOUNTER
----- Message from Isabelle Sarkar sent at 10/20/2022  3:42 PM CDT -----  Regarding: Swallow Study  [facility] called and stated if pt swallow study can be faxed to them, states that cannot see it [place] the [facility]     Fax Number [phone]  Att: Aimee [phone]- [phone]

## 2022-10-20 NOTE — TELEPHONE ENCOUNTER
ATTEMPTED TO CONTACT [facility] BECAUSE WE DON'T HAVE ANYTHING CONCERNING MBS ON PT IM NOT SURE WHERE PT COMPLETED TEST.

## 2022-11-07 PROBLEM — Z71.89 ADVANCE CARE PLANNING: Status: ACTIVE | Noted: 2022-01-01

## 2022-11-07 PROBLEM — Z95.2 S/P MVR (MITRAL VALVE REPLACEMENT): Chronic | Status: ACTIVE | Noted: 2022-01-01

## 2022-11-07 PROBLEM — I25.10 ARTERIOSCLEROSIS OF CORONARY ARTERY: Chronic | Status: ACTIVE | Noted: 2022-01-01

## 2022-11-07 PROBLEM — E03.9 HYPOTHYROIDISM: Chronic | Status: ACTIVE | Noted: 2022-01-01

## 2022-11-07 PROBLEM — K21.9 GASTROESOPHAGEAL REFLUX DISEASE: Chronic | Status: ACTIVE | Noted: 2022-01-01

## 2022-11-07 PROBLEM — I10 HTN (HYPERTENSION): Chronic | Status: ACTIVE | Noted: 2022-01-01

## 2022-11-07 PROBLEM — N18.31 STAGE 3A CHRONIC KIDNEY DISEASE: Chronic | Status: ACTIVE | Noted: 2022-01-01

## 2022-11-07 NOTE — TELEPHONE ENCOUNTER
----- Message from ANA LAURA Coleman sent at 11/7/2022  2:27 PM CST -----  Please have Mrs. Price come in for appointment as soon as she can this week for increase in creatinine. Thank you

## 2022-11-07 NOTE — PROGRESS NOTES
Subjective:      Patient ID: Dee Haji is a 88 y.o. female.    Chief Complaint: [employer] paperwork    Ms. Price is a 87-year-old female in with history of atrial fibrillation, hypertension, CKD, GERD, status post mitral valve replacement with CHF who is here today to complete paperwork that is necessary to move into an assisted living facility with her .       Between our last visit and now patient has been seen by Nephrology and renal indices have been remaining stable.  The fluctuation in the renal indices deemed to be secondary to her diuresis because of congestive heart failure history and there will always be a very fine line between getting the right fluid balance achieved.  Patient verbalizes this and understands to avoid nephrotoxic medications     Advanced care planning was done also during the visit today and LA post was completed       Cardiologist: Dr. Payton   GI: Dr. Nunez    The patient's Health Maintenance was reviewed and the following appears to be due at this time:   Health Maintenance Due   Topic Date Due    Influenza Vaccine (1) 09/01/2022        Past Medical History:  Past Medical History:   Diagnosis Date    A-fib     CAD (coronary artery disease)     CVA (cerebrovascular accident)     Dyspnea on exertion     GERD (gastroesophageal reflux disease)     HTN (hypertension)     Mixed hyperlipidemia     S/P AVR (aortic valve replacement)     Synovial cyst of knee     TIA (transient ischemic attack)     Unspecified hemorrhoids     Unspecified osteoarthritis, unspecified site      Past Surgical History:   Procedure Laterality Date    ANGIOGRAM, CORONARY, WITH LEFT HEART CATHETERIZATION      BUNIONECTOMY Right     CATARACT EXTRACTION Right     CHOLECYSTECTOMY      CORONARY STENT PLACEMENT      left lower lobectomy Left      Review of patient's allergies indicates:  No Known Allergies  Social History     Socioeconomic History    Marital status:    Tobacco Use    Smoking  "status: Former     Types: Cigarettes     Quit date: 1/3/1972     Years since quittin.8    Smokeless tobacco: Never    Tobacco comments:     quit in    Substance and Sexual Activity    Alcohol use: Not Currently    Drug use: Not Currently     Family History   Problem Relation Age of Onset    Heart failure Mother     Cancer Father     Diabetes Sister     Diabetes Brother        Review of Systems    A comprehensive review of systems was performed and is negative except for that stated above  Objective:   /68 (BP Location: Left arm, Patient Position: Sitting, BP Method: Small (Manual))   Pulse 85   Temp 98.3 °F (36.8 °C) (Temporal)   Ht 5' 2" (1.575 m)   Wt 48.3 kg (106 lb 6.4 oz)   LMP  (LMP Unknown)   SpO2 95%   BMI 19.46 kg/m²     Physical Exam  Constitutional:       Appearance: Normal appearance.   HENT:      Head: Normocephalic and atraumatic.      Nose: Nose normal.      Mouth/Throat:      Mouth: Mucous membranes are moist.      Pharynx: Oropharynx is clear.   Eyes:      Extraocular Movements: Extraocular movements intact.      Pupils: Pupils are equal, round, and reactive to light.   Cardiovascular:      Rate and Rhythm: Normal rate and regular rhythm.      Pulses: Normal pulses.   Pulmonary:      Effort: Pulmonary effort is normal.      Breath sounds: Normal breath sounds.   Abdominal:      General: Bowel sounds are normal.      Palpations: Abdomen is soft.   Musculoskeletal:         General: Normal range of motion.      Cervical back: Normal range of motion and neck supple.   Skin:     General: Skin is warm.   Neurological:      General: No focal deficit present.      Mental Status: She is alert and oriented to person, place, and time. Mental status is at baseline.   Psychiatric:         Mood and Affect: Mood normal.     Assessment/ Plan:   1. S/P MVR (mitral valve replacement)  Assessment & Plan:  -doing well, followed by Cardiology   -on Xarelto, continue      2. Primary " hypertension  Assessment & Plan:  Well controlled.   Continue current medications, seems to be doing  Low Sodium Diet (DASH Diet - Less than 2 grams of sodium per day).  Monitor blood pressure daily and log. Report consistent numbers greater than 140/90.  Maintain healthy weight with goal BMI <30. Exercise 30 minutes per day, 5 days per week.  Smoking cessation encouraged to aid in BP reduction.            3. Arteriosclerosis of coronary artery    4. Stage 3a chronic kidney disease  Assessment & Plan:  CrCl cannot be calculated (Patient's most recent lab result is older than the maximum 7 days allowed.).  Last GFR - . Stable from renal standpoint.  Continue current management  Follow renoprotective measures including Renal Diet (reduce intake of nuts, peanut butter, milk, cheese, dried beans, peas) and Low Sodium Diet (less than 2 grams per day).  Avoid NSAIDs (Aleve, Mobic, Celebrex, Ibuprofen, Advil, Toradol and Diclofenac). May take Tylenol as needed for headache/pain.  Control DM with goal A1C <7. BP goal <130/80. LDL goal < 100.  Stay well hydrated. Avoid alcohol and soda. Limit tea and coffee.  Smoking Cessation recommended.      5. Hypothyroidism, unspecified type  Assessment & Plan:    Continue levothyroxine at 50 mcg p.o. daily  Take medicine on an empty stomach with water (no other medications or beverages). Wait 30 minutes to eat or drink.  Report any symptoms of thinning hair, breaking nails, fatigue, weight gain or loss, palpitations.       6. Gastroesophageal reflux disease without esophagitis  Assessment & Plan:  -patient advised to avoid foods that trigger acid reflux and he had at least 2 hours before bedtime.      7. Advance care planning  Assessment & Plan:    Advance Care Planning   I attest to discussing Advance Care Planning with patient and/or family member.  Education was provided including the importance of the Health Care Power of , Advance Directives, and/or [employer]  documentation.  The patient expressed understanding to the importance of this information and discussion.  We completed the LA post at the visit today.  Goals of Care: The patient expressed that what is most important right now is to focus on:  Comfort focused care  Length of ACP conversation in minutes: 11 mns

## 2022-11-09 NOTE — PROGRESS NOTES
[facility] [place] [facility] Progress [facility]      HPI  Dee Haji, 88 y.o. female,  has a past medical history of A-fib, CAD (coronary artery disease), CVA (cerebrovascular accident), Dyspnea on exertion, GERD (gastroesophageal reflux disease), HTN (hypertension), Mixed hyperlipidemia, S/P AVR (aortic valve replacement), Synovial cyst of knee, TIA (transient ischemic attack), Unspecified hemorrhoids, and Unspecified osteoarthritis, unspecified site. Dee Haji presents to office for a follow up visit for CKD 3A be secondary to nephrosclerosis with component of cardiorenal disease.  We brought patient in for a sooner follow-up visit due to creatinine increase from 1.8-2.6 within 1 month.  Pt recent blood work shows worsening creatinine, Here for FU  Has some increase edema  Pt admitted to increase fluid intake    Her BP at home has been 110s/60s.      Patient denies taking NSAIDs, new antibiotics or recreational drugs. Denies recent episode of dehydration, diarrhea, vomiting, acute illness, hospitalization, recent angiograms or exposure to IV radiocontrast.      Medical History:   Past Medical History:   Diagnosis Date    A-fib     CAD (coronary artery disease)     CVA (cerebrovascular accident)     Dyspnea on exertion     GERD (gastroesophageal reflux disease)     HTN (hypertension)     Mixed hyperlipidemia     S/P AVR (aortic valve replacement)     Synovial cyst of knee     TIA (transient ischemic attack)     Unspecified hemorrhoids     Unspecified osteoarthritis, unspecified site        Surgical History:   Past Surgical History:   Procedure Laterality Date    ANGIOGRAM, CORONARY, WITH LEFT HEART CATHETERIZATION      BUNIONECTOMY Right     CATARACT EXTRACTION Right     CHOLECYSTECTOMY      CORONARY STENT PLACEMENT      left lower lobectomy Left        Family History:   Family History   Problem Relation Age of Onset    Heart failure Mother     Cancer Father     Diabetes Sister     Diabetes Brother         Social History:   Social History     Tobacco Use    Smoking status: Former     Types: Cigarettes     Quit date: 1/3/1972     Years since quittin.8    Smokeless tobacco: Never    Tobacco comments:     quit in    Substance Use Topics    Alcohol use: Not Currently       Allergies:  Review of patient's allergies indicates:  No Known Allergies    Medications:    Current Outpatient Medications:     cholecalciferol, vitamin D3, 125 mcg (5,000 unit) capsule, Take 5,000 Int'l Units by mouth once daily., Disp: , Rfl:     clopidogreL (PLAVIX) 75 mg tablet, Take 75 mg by mouth once daily., Disp: , Rfl:     docusate sodium (COLACE) 100 MG capsule, Take 100 mg by mouth 2 (two) times daily., Disp: , Rfl:     fenofibrate micronized (LOFIBRA) 134 MG Cap, Take 134 mg by mouth once daily at 6am., Disp: , Rfl:     folic acid/multivit-min/lutein (CENTRUM SILVER ORAL), Take 1 tablet by mouth once daily., Disp: , Rfl:     hydrOXYzine HCL (ATARAX) 25 MG tablet, Take 1 tablet (25 mg total) by mouth every evening., Disp: 30 tablet, Rfl: 5    levothyroxine (SYNTHROID) 50 MCG tablet, Take 1 tablet (50 mcg total) by mouth before breakfast., Disp: 30 tablet, Rfl: 11    nitroGLYCERIN (NITROSTAT) 0.4 MG SL tablet, Place 0.4 mg under the tongue every 5 (five) minutes as needed. X 3 doses, Disp: , Rfl:     ondansetron (ZOFRAN) 4 MG tablet, Take 1 tablet (4 mg total) by mouth every 6 (six) hours as needed for Nausea., Disp: 24 tablet, Rfl: 0    pantoprazole (PROTONIX) 40 MG tablet, TAKE 1 TABLET(40 MG) BY MOUTH EVERY DAY, Disp: 30 tablet, Rfl: 5    ranolazine (RANEXA) 500 MG Tb12, 500 mg 2 (two) times daily., Disp: , Rfl:     rivaroxaban (XARELTO) 2.5 mg Tab, Take 2.5 mg by mouth 2 (two) times daily., Disp: , Rfl:     rosuvastatin (CRESTOR) 40 MG Tab, Take 40 mg by mouth once daily., Disp: , Rfl:     sucralfate (CARAFATE) 1 gram tablet, Take 1 tablet (1 g total) by mouth 3 (three) times daily., Disp: 270 tablet, Rfl: 3    torsemide  "(DEMADEX) 20 MG Tab, Take 2 tablets (40 mg total) by mouth once daily., Disp: 60 tablet, Rfl: 11    vit A/vit C/vit E/zinc/copper (PRESERVISION AREDS ORAL), Take 1 capsule by mouth once daily., Disp: , Rfl:     tamsulosin (FLOMAX) 0.4 mg Cap, Take 1 capsule (0.4 mg total) by mouth once daily. for 14 days, Disp: 14 capsule, Rfl: 0       ROS:    Constitutional: Denies fever, fatigue, generalized weakness, night sweats, or acute weight change  Skin: Denies wounds, no rashes, no itching, no new skin lesions  HEENT: Denies acute change in hearing or vision, tinnitus, or dysphagia  Respiratory:  Denies cough, shortness of breath, or wheezing  Cardiovascular: Denies chest pain, palpitations, or swelling  Gastrointestional: Denies abdominal pain, nausea, vomiting, diarrhea, or constipation  Genitourinary: Denies dysuria, hematuria, or incontinence; reports able to empty bladder  Musculoskeletal: Denies myalgias, back pain, decreased ROM or focal weakness  Neurological: Denies headaches, seizures, dizziness, paresthesias or weakness  Hematological: Denies unusual bruising or bleeding  Psychiatric: Denies hallucinations, depression, or confusion      Vital Signs:  BP (!) 110/58 (BP Location: Left arm, Patient Position: Sitting)   Pulse (!) 55   Temp 98.1 °F (36.7 °C) (Temporal)   Resp 20   Ht 5' 2" (1.575 m)   Wt 48.9 kg (107 lb 12.9 oz)   LMP  (LMP Unknown)   SpO2 99%   BMI 19.72 kg/m²   Body mass index is 19.72 kg/m².      Physical Exam:    General: no acute distress, awake, alert  Eyes: PERRLA, EOMI, conjunctiva clear, eyelids without swelling  HENT: atraumatic, oropharynx and nasal mucosa patent  Neck: full ROM, no JVD, no thyromegaly or lymphadenopathy  Respiratory: equal, unlabored, rhonchi at basis  Cardiovascular: RANDALL g3/6 LSB, no rub; radial and pedal pulses felt  Edema: +2-+3 lower ext  Gastrointestinal: soft, non-tender, non-distended; positive bowel sounds; no masses to palpation  Musculoskeletal: ROM " without major limitation or discomfort  Integumentary: warm, dry; no rashes, wounds, or skin lesions  Neurological: oriented, appropriate, no acute deficits      Labs:        Component Value Date/Time     (H) 11/07/2022 1210     10/03/2022 1500    K 4.5 11/07/2022 1210    K 4.4 10/03/2022 1500    CO2 30 11/07/2022 1210    CO2 29 10/03/2022 1500    BUN 61.6 (H) 11/07/2022 1210    BUN 47.7 (H) 10/03/2022 1500    CREATININE 2.66 (H) 11/07/2022 1210    CREATININE 1.89 (H) 10/03/2022 1500    EGFRNONAA 42 06/21/2022 1314    EGFRNONAA 22 06/20/2022 0215    CALCIUM 9.8 11/07/2022 1210    CALCIUM 10.0 10/03/2022 1500    PHOS 3.7 09/19/2022 1421    .0 (H) 09/19/2022 1421            Component Value Date/Time    WBC 7.0 11/07/2022 1210    WBC 7.7 10/03/2022 1500    HGB 9.2 (L) 11/07/2022 1210    HGB 9.2 (L) 10/03/2022 1500    HCT 33.3 (L) 11/07/2022 1210    HCT 32.1 (L) 10/03/2022 1500     11/07/2022 1210     10/03/2022 1500         Impression:    Patient Active Problem List   Diagnosis    Arteriosclerosis of coronary artery    Peripheral vascular disease, unspecified    Stage 3a chronic kidney disease    A-fib    Gastroesophageal reflux disease    Mixed hyperlipidemia    Transient ischemic attack    HTN (hypertension)    S/P MVR (mitral valve replacement)    Dyspnea on exertion    Valvular heart disease    RENÉ (acute kidney injury)    Advance care planning    Hypothyroidism     1. Stage 3a chronic kidney disease        2. Hypertension, unspecified type        3. Cardiorenal disease            [id number] secondary to nephrosclerosis and cardiorenal disease: renal indices fluctuate with cardiac output and diuresis  Renal function with significant deterioration over the last 4 weeks.    Relatively low BP for CKD; 110s/60s; and HR in 50s  She is not on anti-hypertensives, however she is on ranexa which can cause hypotension and bradycardia and not clearly recommended in advanced CKD    Plan:  Low  sodium diet  Elevated legs/compression stockings  Monitor BP at home.    DC ranexa    Labs 11/28  Fu within that week    Avoid NSAIDs (Aleve, Mobic, Celebrex, Ibuprofen, Advil, Toradol and Diclofenac).       Gianni DU

## 2022-11-17 PROBLEM — R13.19 ESOPHAGEAL DYSPHAGIA: Status: ACTIVE | Noted: 2022-01-01

## 2022-11-17 PROBLEM — R13.12 DYSPHAGIA, OROPHARYNGEAL PHASE: Status: ACTIVE | Noted: 2022-01-01

## 2022-11-17 NOTE — ANESTHESIA POSTPROCEDURE EVALUATION
Anesthesia Post Evaluation    Patient: Dee Haji    Procedure(s) Performed: Procedure(s) (LRB):  EGD W/DIL (N/A)    Final Anesthesia Type: general      Patient location during evaluation: PACU  Patient participation: Yes- Able to Participate  Level of consciousness: awake and alert  Post-procedure vital signs: reviewed and stable  Pain management: adequate  Airway patency: patent    PONV status at discharge: No PONV  Anesthetic complications: no      Cardiovascular status: hemodynamically stable  Respiratory status: unassisted  Hydration status: euvolemic  Follow-up not needed.          Vitals Value Taken Time   /70 11/17/22 1144   Temp ** 11/17/22 1551   Pulse 70 11/17/22 1144   Resp 20 11/17/22 1144   SpO2 100 % 11/17/22 1144         No case tracking events are documented in the log.      Pain/Sharif Score: Sharif Score: 10 (11/17/2022 11:44 AM)

## 2022-11-17 NOTE — H&P
[facility] End[facility]  Gastroenterology  H&P    Patient Name: Dee Haji  MRN: [id number]  Admission Date: 2022  Code Status: Prior    Attending Provider: CIARA Nunez MD  Primary Care Physician: Tierra Johnson MD  Principal Problem:<principal problem not specified>    Subjective:     History of Present Illness:  88-year-old with a history of several months of increasing solid and liquid dysphagia with a 10 lb weight loss with subsequent 2 lb weight gain after switching over to a higher protein liquid diet.  She notes that she has to swallow several times to get a solid down and almost as many times to get a liquid down.  She has no history of a thyroid surgery radiation or neck surgery.    Past Medical History:   Diagnosis Date    A-fib     CAD (coronary artery disease)     CVA (cerebrovascular accident)     Dyspnea on exertion     GERD (gastroesophageal reflux disease)     HTN (hypertension)     Mixed hyperlipidemia     Renal disorder     S/P AVR (aortic valve replacement)     Synovial cyst of knee     TIA (transient ischemic attack)     Unspecified hemorrhoids     Unspecified osteoarthritis, unspecified site        Past Surgical History:   Procedure Laterality Date    ANGIOGRAM, CORONARY, WITH LEFT HEART CATHETERIZATION      BUNIONECTOMY Right     CATARACT EXTRACTION Right     CHOLECYSTECTOMY      CORONARY STENT PLACEMENT      left lower lobectomy Left        Review of patient's allergies indicates:  No Known Allergies  Family History       Problem Relation (Age of Onset)    Cancer Father    Diabetes Sister, Brother    Heart failure Mother          Tobacco Use    Smoking status: Former     Types: Cigarettes     Quit date: 1/3/1972     Years since quittin.9    Smokeless tobacco: Never    Tobacco comments:     quit in    Substance and Sexual Activity    Alcohol use: Not Currently    Drug use: Not Currently    Sexual activity: Not on file     Review of  Systems  Objective:     Vital Signs (Most Recent):  Temp: 97.2 °F (36.2 °C) (22 1022)  Pulse: 89 (22 1022)  Resp: (!) 26 (22 1022)  BP: 116/69 (22 1022)  SpO2: 97 % (22 1022)   Vital Signs (24h Range):  Temp:  [97.2 °F (36.2 °C)] 97.2 °F (36.2 °C)  Pulse:  [89] 89  Resp:  [26] 26  SpO2:  [97 %] 97 %  BP: (116)/(69) 116/69     Weight: 47.6 kg (105 lb) (22 1342)  Body mass index is 19.2 kg/m².    No intake or output data in the 24 hours ending 22 1102    Lines/Drains/Airways       Peripheral Intravenous Line  Duration                  Peripheral IV - Single Lumen 22 1033 22 G Anterior;Proximal;Right Forearm <1 day                    Physical Exam  Cardiovascular:      Rate and Rhythm: Normal rate.   Pulmonary:      Effort: Pulmonary effort is normal.   Abdominal:      General: Abdomen is flat.   Skin:     General: Skin is warm.   Neurological:      General: No focal deficit present.      Mental Status: She is alert.       Significant Labs:  All pertinent lab results from the last 24 hours have been reviewed.    Significant Imagin-year-old individual with complaints of some dysphagia which sounds more upper esophageal.  She has trouble swallowing both solids and liquids did lose about 10 lb and has gained about 2 back on a more liquid consistent diet.  The patient states she has to swallow 2 or 3 times to either get a liquid or solid down although it is easier to get a liquid down she states.    Assessment/Plan:     There are no hospital problems to display for this patient.      For upper endoscopy.  This still may be more of an oropharyngeal or motor issue rather than any fixed obstructive issue.    CIARA Nunez MD  Gastroenterology  [facility]

## 2022-11-17 NOTE — PROVATION PATIENT INSTRUCTIONS
Discharge Summary/Instructions after an Endoscopic Procedure  Patient Name: Dee Haji  Patient MRN: [id number]  Patient YOB: 1934 Thursday, November 17, 2022  LAKESHIA Nunez MD  Dear patient,  As a result of recent federal legislation (The Federal Cures Act), you may   receive lab or pathology results from your procedure in your [facility]   account before your physician is able to contact you. Your physician or   their representative will relay the results to you with their   recommendations at their soonest availability.  Thank you,  RESTRICTIONS:  During your procedure today, you received medications for sedation.  These   medications may affect your judgment, balance and coordination.  Therefore,   for 24 hours, you have the following restrictions:   - DO NOT drive a car, operate machinery, make legal/financial decisions,   sign important papers or drink alcohol.    ACTIVITY:  Today: no heavy lifting, straining or running due to procedural   sedation/anesthesia.  The following day: return to full activity including work.  DIET:  Eat and drink normally unless instructed otherwise.     TREATMENT FOR COMMON SIDE EFFECTS:  - Mild abdominal pain, nausea, belching, bloating or excessive gas:  rest,   eat lightly and use a heating pad.  - Sore Throat: treat with throat lozenges and/or gargle with warm salt   water.  - Because air was used during the procedure, expelling large amounts of air   from your rectum or belching is normal.  - If a bowel prep was taken, you may not have a bowel movement for 1-3 days.    This is normal.  SYMPTOMS TO WATCH FOR AND REPORT TO YOUR PHYSICIAN:  1. Abdominal pain or bloating, other than gas cramps.  2. Chest pain.  3. Back pain.  4. Signs of infection such as: chills or fever occurring within 24 hours   after the procedure.  5. Rectal bleeding, which would show as bright red, maroon, or black stools.   (A tablespoon of blood from the rectum is not serious,  especially if   hemorrhoids are present.)  6. Vomiting.  7. Weakness or dizziness.  GO DIRECTLY TO THE NEAREST EMERGENCY ROOM IF YOU HAVE ANY OF THE FOLLOWING:      Difficulty breathing              Chills and/or fever over 101 F   Persistent vomiting and/or vomiting blood   Severe abdominal pain   Severe chest pain   Black, tarry stools   Bleeding- more than one tablespoon   Any other symptom or condition that you feel may need urgent attention  Your doctor recommends these additional instructions:  If any biopsies were taken, your doctors clinic will contact you in 1 to 2   weeks with any results.  - Patient has a contact number available for emergencies.  The signs and   symptoms of potential delayed complications were discussed with the   patient.  Return to normal activities tomorrow.  Written discharge   instructions were provided to the patient.   - Resume previous diet.  monitor weight, alternate liquid with solid bite.   follow speech therapy recommendations  For questions, problems or results please call your physician - LAKESHIA Nunez MD at Work:  [phone].  [facility], EMERGENCY ROOM PHONE NUMBER: [phone]  IF A COMPLICATION OR EMERGENCY SITUATION ARISES AND YOU ARE UNABLE TO REACH   YOUR PHYSICIAN - GO DIRECTLY TO THE EMERGENCY ROOM.  MD LAKESHIA Tejada MD  11/17/2022 11:46:15 AM  This report has been verified and signed electronically.  Dear patient,  As a result of recent federal legislation (The Federal Cures Act), you may   receive lab or pathology results from your procedure in your [facility]   account before your physician is able to contact you. Your physician or   their representative will relay the results to you with their   recommendations at their soonest availability.  Thank you,  PROVATION

## 2022-11-17 NOTE — ANESTHESIA PREPROCEDURE EVALUATION
"                                                                                                             11/17/2022  Dee Haji is a 88 y.o., female with ----------------------------  A-fib  CAD (coronary artery disease)  CVA (cerebrovascular accident)  Dyspnea on exertion  GERD (gastroesophageal reflux disease)  HTN (hypertension)  Mixed hyperlipidemia  Renal disorder  S/P AVR (aortic valve replacement)  Synovial cyst of knee  TIA (transient ischemic attack)  Unspecified hemorrhoids  Unspecified osteoarthritis, unspecified site    And ----------------------------  Angiogram, coronary, with left heart catheterization  Bunionectomy  Cataract extraction  Cholecystectomy  Coronary stent placement  Left lower lobectomy    Recent visit last week to nephrologist Dr. Almeida "Dee Haji, 88 y.o. female,  has a past medical history of A-fib, CAD (coronary artery disease), CVA (cerebrovascular accident), Dyspnea on exertion, GERD (gastroesophageal reflux disease), HTN (hypertension), Mixed hyperlipidemia, S/P AVR (aortic valve replacement), Synovial cyst of knee, TIA (transient ischemic attack), Unspecified hemorrhoids, and Unspecified osteoarthritis, unspecified site. Dee Haji presents to office for a follow up visit for CKD 3A be secondary to nephrosclerosis with component of cardiorenal disease.  We brought patient in for a sooner follow-up visit due to creatinine increase from 1.8-2.6 within 1 month.  Pt recent blood work shows worsening creatinine, Here for FU  Has some increase edema  Pt admitted to increase fluid intake     Her BP at home has been 110s/60s"    Here today for EGD with dilation.   .    Vent. Rate : 083 BPM     Atrial Rate : 083 BPM      P-R Int : 272 ms          QRS Dur : 096 ms       QT Int : 400 ms       P-R-T Axes : 085 047 -57 degrees      QTc Int : 470 ms     She is on a diuretic for her CHF - but none today     Sinus rhythm with 1st degree A-V block with Fusion complexes and " Premature   atrial complexes with Aberrant conduction   ST and T wave abnormality, consider inferolateral ischemia   Prolonged QT   Abnormal ECG   Confirmed by Giorgi Mcclendon MD ([id number]) on 9/28/2022 10:44:36 AM     Pre-op Assessment    I have reviewed the NPO Status.      Review of Systems      Physical Exam  General: Well nourished, Cooperative, Alert and Oriented  Elderly but spry - good historian  Airway:  Mallampati: II   Mouth Opening: Normal  TM Distance: Normal  Tongue: Normal  Neck ROM: Normal ROM    Dental:    Chest/Lungs:  Clear to auscultation, Normal Respiratory Rate    Heart:  Rate: Normal  Rhythm: Regular Rhythm  2+ LE edema   Musculoskeletal:+2      Anesthesia Plan  Type of Anesthesia, risks & benefits discussed:    Anesthesia Type: Gen Natural Airway  Intra-op Monitoring Plan: Standard ASA Monitors  Post Op Pain Control Plan: IV/PO Opioids PRN  Induction:  IV  Airway Plan: Direct  Informed Consent: Informed consent signed with the Patient and all parties understand the risks and agree with anesthesia plan.  All questions answered. Patient consented to blood products? No  ASA Score: 3  Day of Surgery Review of History & Physical: H&P Update referred to the surgeon/provider.  Anesthesia Plan Notes: Nasal cannula vs facemask supplemental oxygenation   For patients with DEB/obesity, may consider SuperNoval Nasal CPAP      Ready For Surgery From Anesthesia Perspective.     .

## 2022-12-05 NOTE — TELEPHONE ENCOUNTER
Pt confirmed tomorrows appt. Advised pt to bring a list of their medications. Pt voiced understanding.

## 2022-12-06 NOTE — FIRST PROVIDER EVALUATION
Medical screening examination initiated.  I have conducted a focused provider triage encounter, findings are as follows:    Brief history of present illness:  Patient states that Dr. Almeida sent her to the ED for evaluation. States generalized edema and SOB.     There were no vitals filed for this visit.    Pertinent physical exam:  Awake, alert    Brief workup plan:  labs    Preliminary workup initiated; this workup will be continued and followed by the physician or advanced practice provider that is assigned to the patient when roomed.

## 2022-12-06 NOTE — TELEPHONE ENCOUNTER
----- Message from Dawna Pak sent at 12/6/2022 10:42 AM CST -----  Regarding: last office visit  Please fax last office visit to [facility]  fax # [phone]

## 2022-12-06 NOTE — PROGRESS NOTES
[facility] Progress Note      HPI  Dee Haji, 88 y.o. female,  has a past medical history of A-fib, CAD (coronary artery disease), CVA (cerebrovascular accident), Dyspnea on exertion, GERD (gastroesophageal reflux disease), HTN (hypertension), Mixed hyperlipidemia, Renal disorder, S/P AVR (aortic valve replacement), Synovial cyst of knee, TIA (transient ischemic attack), Unspecified hemorrhoids, and Unspecified osteoarthritis, unspecified site. Dee Haji presents to office for a follow up visit for YLD5s-0 CARDIO- RENAL DISEASE.    Pt has gained 20 pounds in the last 2-3 weeks  She feels poor.   DENIES HEMATOCHEZIA HEMOPTYSIS SHE HAS WORSENING DYSPNEA SHE IS MORE DECOMPENSATING PHYSICALLY REQUIRING WHEELCHAIR FOR AMBULATION      Patient denies taking NSAIDs, new antibiotics or recreational drugs. Denies recent episode of dehydration, diarrhea, vomiting, acute illness, hospitalization, recent angiograms or exposure to IV radiocontrast.      Medical History:   Past Medical History:   Diagnosis Date    A-fib     CAD (coronary artery disease)     CVA (cerebrovascular accident)     Dyspnea on exertion     GERD (gastroesophageal reflux disease)     HTN (hypertension)     Mixed hyperlipidemia     Renal disorder     S/P AVR (aortic valve replacement)     Synovial cyst of knee     TIA (transient ischemic attack)     Unspecified hemorrhoids     Unspecified osteoarthritis, unspecified site        Surgical History:   Past Surgical History:   Procedure Laterality Date    ANGIOGRAM, CORONARY, WITH LEFT HEART CATHETERIZATION      BUNIONECTOMY Right     CATARACT EXTRACTION Right     CHOLECYSTECTOMY      CORONARY STENT PLACEMENT      ESOPHAGOGASTRODUODENOSCOPY N/A 11/17/2022    Procedure: EGD W/DIL;  Surgeon: CIARA Nunez MD;  Location: [facility] ENDOSCOPY;  Service: Gastroenterology;  Laterality: N/A;  48FR  Garcia    left lower lobectomy Left        Family History:   Family History   Problem  Relation Age of Onset    Heart failure Mother     Cancer Father     Diabetes Sister     Diabetes Brother        Social History:   Social History     Tobacco Use    Smoking status: Former     Types: Cigarettes     Quit date: 1/3/1972     Years since quittin.9    Smokeless tobacco: Never    Tobacco comments:     quit in    Substance Use Topics    Alcohol use: Not Currently       Allergies:  Review of patient's allergies indicates:  No Known Allergies    Medications:    Current Outpatient Medications:     cholecalciferol, vitamin D3, 125 mcg (5,000 unit) capsule, Take 5,000 Int'l Units by mouth once daily., Disp: , Rfl:     clopidogreL (PLAVIX) 75 mg tablet, Take 75 mg by mouth once daily., Disp: , Rfl:     docusate sodium (COLACE) 100 MG capsule, Take 100 mg by mouth 2 (two) times daily., Disp: , Rfl:     fenofibrate micronized (LOFIBRA) 134 MG Cap, Take 134 mg by mouth once daily at 6am., Disp: , Rfl:     folic acid/multivit-min/lutein (CENTRUM SILVER ORAL), Take 1 tablet by mouth once daily., Disp: , Rfl:     hydrOXYzine HCL (ATARAX) 25 MG tablet, Take 1 tablet (25 mg total) by mouth every evening., Disp: 30 tablet, Rfl: 5    levothyroxine (SYNTHROID) 50 MCG tablet, Take 1 tablet (50 mcg total) by mouth before breakfast., Disp: 30 tablet, Rfl: 11    nitroGLYCERIN (NITROSTAT) 0.4 MG SL tablet, Place 0.4 mg under the tongue every 5 (five) minutes as needed. X 3 doses, Disp: , Rfl:     ondansetron (ZOFRAN) 4 MG tablet, Take 1 tablet (4 mg total) by mouth every 6 (six) hours as needed for Nausea., Disp: 24 tablet, Rfl: 0    rivaroxaban (XARELTO) 2.5 mg Tab, Take 2.5 mg by mouth 2 (two) times daily., Disp: , Rfl:     rosuvastatin (CRESTOR) 40 MG Tab, Take 40 mg by mouth once daily., Disp: , Rfl:     tamsulosin (FLOMAX) 0.4 mg Cap, Take 1 capsule (0.4 mg total) by mouth once daily. for 14 days, Disp: 14 capsule, Rfl: 0    torsemide (DEMADEX) 20 MG Tab, Take 2 tablets (40 mg total) by mouth once daily., Disp: 60  "tablet, Rfl: 11    vit A/vit C/vit E/zinc/copper (PRESERVISION AREDS ORAL), Take 1 capsule by mouth once daily., Disp: , Rfl:        ROS:    Constitutional: +fatigue, generalized weakness, swelling  Skin: Denies wounds, no rashes, no itching, no new skin lesions  HEENT: Denies acute change in hearing or vision, tinnitus, or dysphagia  Respiratory:  Denies cough, shortness of breath, or wheezing  Cardiovascular: Denies chest pain, palpitations; + generalized swelling  Gastrointestional: Denies abdominal pain, nausea, vomiting, diarrhea, or constipation; +denies occult blood loss  Genitourinary: Denies dysuria, hematuria, or incontinence; reports able to empty bladder  Musculoskeletal: Denies myalgias, back pain, decreased ROM or focal weakness  Neurological: Denies headaches, seizures, dizziness, paresthesias or weakness  Hematological: Denies unusual bruising or bleeding  Psychiatric: Denies hallucinations, depression, or confusion      Vital Signs:  /76 (BP Location: Left arm, Patient Position: Sitting)   Pulse 85   Temp 97.7 °F (36.5 °C) (Temporal)   Resp 20   Ht 5' 2" (1.575 m)   Wt 54.6 kg (120 lb 5.9 oz)   LMP  (LMP Unknown)   SpO2 (!) 94%   BMI 22.02 kg/m²   Body mass index is 22.02 kg/m².      Physical Exam:    General: no acute distress, awake, alert, pale  Eyes: PERRLA, EOMI, conjunctiva clear, eyelids without swelling  HENT: atraumatic, oropharynx and nasal mucosa patent  Neck: full ROM, +JVD, no thyromegaly or lymphadenopathy  Respiratory: equal, unlabored, basilar rales  Cardiovascular: RRR, ++RANDALL g3/6 LSB; radial and pedal pulses felt  Edema: +3 BLLE; BL hand swelling R>L  Gastrointestinal: soft, non-tender, non-distended; positive bowel sounds; no masses to palpation  Musculoskeletal: wheel chair dependent  Integumentary: warm, dry; no rashes, wounds, or skin lesions  Neurological: oriented, appropriate, no acute focal deficits      Labs:        Component Value Date/Time     " 12/01/2022 0746     (H) 11/07/2022 1210    K 4.6 12/01/2022 0746    K 4.5 11/07/2022 1210    CO2 33 (H) 12/01/2022 0746    CO2 30 11/07/2022 1210    BUN 88.1 (H) 12/01/2022 0746    BUN 61.6 (H) 11/07/2022 1210    CREATININE 2.49 (H) 12/01/2022 0746    CREATININE 2.66 (H) 11/07/2022 1210    EGFRNONAA 42 06/21/2022 1314    EGFRNONAA 22 06/20/2022 0215    CALCIUM 9.1 12/01/2022 0746    CALCIUM 9.8 11/07/2022 1210    PHOS 4.1 12/01/2022 0746    .2 (H) 12/01/2022 0746            Component Value Date/Time    WBC 5.1 12/01/2022 0746    WBC 7.0 11/07/2022 1210    HGB 8.6 (L) 12/01/2022 0746    HGB 9.2 (L) 11/07/2022 1210    HCT 32.2 (L) 12/01/2022 0746    HCT 33.3 (L) 11/07/2022 1210     12/01/2022 0746     11/07/2022 1210         Impression:    Patient Active Problem List   Diagnosis    Arteriosclerosis of coronary artery    Peripheral vascular disease, unspecified    Stage 3a chronic kidney disease    A-fib    Gastroesophageal reflux disease    Mixed hyperlipidemia    Transient ischemic attack    HTN (hypertension)    S/P MVR (mitral valve replacement)    Dyspnea on exertion    Valvular heart disease    RENÉ (acute kidney injury)    Advance care planning    Hypothyroidism    Esophageal dysphagia    Dysphagia, oropharyngeal phase     1. Stage 3a chronic kidney disease        2. Hypertension, unspecified type        3. Cardiorenal disease          Worsening volume overload  Decompensated CHF  Worsening anemia low MCV and high BUN concerning for occult GI bleeding  Worsening cardiorenal component with worsening renal function      Plan:  Patient is admitted to being too weak to go back home  Will discuss with her primary physician possibility of admitting her for IV diuresis and try to optimize her renal function  Echo will also be ordered  Iron studies and stool occult blood will be checked and more workup and management will be done accordingly    Avoid NSAIDs (Aleve, Mobic, Celebrex, Ibuprofen,  Advil, Toradol and Diclofenac).       Gianni Payton

## 2022-12-07 PROBLEM — I50.43 ACUTE ON CHRONIC COMBINED SYSTOLIC AND DIASTOLIC HEART FAILURE: Status: ACTIVE | Noted: 2022-01-01

## 2022-12-07 NOTE — H&P
[facility] Hospital Medicine History & Physical Examination       Patient Name: Dee Haji  MRN: [id number]  Patient Class: IP- Inpatient   Admission Date: 12/6/2022   Admitting Physician: DEGLADO Service   Length of Stay: 1  Attending Physician: Tierra Johnson MD  Primary Care Provider: Tierra Johnson MD  Face-to-Face encounter date: 12/07/2022  Code Status:  Do Not resuscitate  Chief Complaint: Leg Swelling (C/o LE and UE swelling x1 week ago. Pt reports occasional SOB. Denies CP and fever. Sent by Dr. Almeida for fluid overload and admission. Hx CHF, on fluid pills. 90% on RA, 2L/min NC placed in triage. )        Patient information was obtained from patient, patient's family, past medical records and ER records.     HISTORY OF PRESENT ILLNESS:   Dee Haji is a 88 y.o. female who  has a past medical history of A-fib, CAD (coronary artery disease), CVA (cerebrovascular accident), Dyspnea on exertion, GERD (gastroesophageal reflux disease), HTN (hypertension), Mixed hyperlipidemia, Renal disorder, S/P AVR (aortic valve replacement), Synovial cyst of knee, TIA (transient ischemic attack), Unspecified hemorrhoids, and Unspecified osteoarthritis, unspecified site.. The patient presented to [facility] on 12/6/2022 with a primary complaint of worsening shortness of breath    She was actually directed to the hospital after she was sent by Dr. Almeida and noted to be and fluid overload.  Her oxygen saturation was noted to be at 90% on room air.  Patient appears to be going into cardiorenal disease and that has been multiple conversations regarding her frail status with discussions regarding possible transition to palliative care depending on how she does.    After she was sent to emergency room, patient was initiated on IV Lasix at 80 mg b.i.d. and at the time of my visit this morning is already starting to feel much better.  States she was able to rest better and is not as  short of breath anymore.  Her oxygen requirement went up to 6 L however patient had received some morphine in the morning.  Her  was at bedside at the time of my visit and patient had no other acute needs or complaints  PAST MEDICAL HISTORY:     Past Medical History:   Diagnosis Date    A-fib     CAD (coronary artery disease)     CVA (cerebrovascular accident)     Dyspnea on exertion     GERD (gastroesophageal reflux disease)     HTN (hypertension)     Mixed hyperlipidemia     Renal disorder     S/P AVR (aortic valve replacement)     Synovial cyst of knee     TIA (transient ischemic attack)     Unspecified hemorrhoids     Unspecified osteoarthritis, unspecified site        PAST SURGICAL HISTORY:     Past Surgical History:   Procedure Laterality Date    ANGIOGRAM, CORONARY, WITH LEFT HEART CATHETERIZATION      BUNIONECTOMY Right     CATARACT EXTRACTION Right     CHOLECYSTECTOMY      CORONARY STENT PLACEMENT      ESOPHAGOGASTRODUODENOSCOPY N/A 2022    Procedure: EGD W/DIL;  Surgeon: CIARA Nunez MD;  Location: [facility] ENDOSCOPY;  Service: Gastroenterology;  Laterality: N/A;  48FR  Garcia    left lower lobectomy Left        ALLERGIES:   Patient has no known allergies.    FAMILY HISTORY:   Reviewed and negative    SOCIAL HISTORY:     Social History     Tobacco Use    Smoking status: Former     Types: Cigarettes     Quit date: 1/3/1972     Years since quittin.9    Smokeless tobacco: Never    Tobacco comments:     quit in    Substance Use Topics    Alcohol use: Not Currently        HOME MEDICATIONS:     Prior to Admission medications    Medication Sig Start Date End Date Taking? Authorizing Provider   clopidogreL (PLAVIX) 75 mg tablet Take 75 mg by mouth once daily. 12/10/21  Yes Historical Provider   docusate sodium (COLACE) 100 MG capsule Take 100 mg by mouth 2 (two) times daily. 22  Yes Historical Provider   fenofibrate micronized (LOFIBRA) 134 MG Cap Take 134 mg by mouth once  daily at 6am. 2/20/22  Yes Historical Provider   folic acid/multivit-min/lutein (CENTRUM SILVER ORAL) Take 1 tablet by mouth once daily.   Yes Historical Provider   hydrOXYzine HCL (ATARAX) 25 MG tablet Take 1 tablet (25 mg total) by mouth every evening. 6/28/22  Yes Tierra Johnson MD   levothyroxine (SYNTHROID) 50 MCG tablet Take 1 tablet (50 mcg total) by mouth before breakfast. 9/6/22 9/6/23 Yes Tierra Johnson MD   nitroGLYCERIN (NITROSTAT) 0.4 MG SL tablet Place 0.4 mg under the tongue every 5 (five) minutes as needed. X 3 doses 12/10/21  Yes Historical Provider   ondansetron (ZOFRAN) 4 MG tablet Take 1 tablet (4 mg total) by mouth every 6 (six) hours as needed for Nausea. 9/28/22  Yes Manish Mcdonald IV, MD   rivaroxaban (XARELTO) 2.5 mg Tab Take 2.5 mg by mouth 2 (two) times daily. 12/12/21  Yes Historical Provider   rosuvastatin (CRESTOR) 40 MG Tab Take 40 mg by mouth once daily. 12/22/21  Yes Historical Provider   tamsulosin (FLOMAX) 0.4 mg Cap Take 1 capsule (0.4 mg total) by mouth once daily. for 14 days 9/28/22 12/6/22 Yes Manish Mcdonald IV, MD   torsemide (DEMADEX) 20 MG Tab Take 2 tablets (40 mg total) by mouth once daily. 9/20/22 9/20/23 Yes Antonio Almeida MD   vit A/vit C/vit E/zinc/copper (PRESERVISION AREDS ORAL) Take 1 capsule by mouth once daily. 5/26/21  Yes Historical Provider   cholecalciferol, vitamin D3, 125 mcg (5,000 unit) capsule Take 5,000 Int'l Units by mouth once daily. 5/26/21   Historical Provider   hydroCHLOROthiazide (HYDRODIURIL) 25 MG tablet  12/9/21 6/19/22  Historical Provider   olmesartan (BENICAR) 20 MG tablet  12/9/21 6/19/22  Historical Provider       REVIEW OF SYSTEMS:   Except as documented, all other systems reviewed and negative     PHYSICAL EXAM:     VITAL SIGNS: 24 HRS MIN & MAX LAST   Temp  Min: 97.5 °F (36.4 °C)  Max: 97.7 °F (36.5 °C) 97.5 °F (36.4 °C)   BP  Min: 92/58  Max: 132/79 (!) 92/58     Pulse  Min: 76  Max: 93  81   Resp  Min: 13  Max: 21 18    SpO2  Min: 94 %  Max: 100 % 100 %       General appearance:  Alert and oriented, no acute distress, appears tired    HENT: Atraumatic head. Moist mucous membranes of oral cavity.  Eyes: Normal extraocular movements.   Neck: Supple.   Lungs:  Bibasilar crackles, nonlabored   Heart:  3/6 systolic murmur, regular rate and rhythm    Abdomen: Soft, non-distended, non-tender. No rebound tenderness/guarding. Bowel sounds are normal.   Extremities:  3+ bilateral lower extremity pitting edema, some upper extremity edema 2/3+    Neuro:  Alert and oriented, nonfocal   Psych/mental status: Appropriate mood and affect. Responds appropriately to questions.     LABS AND IMAGING:     Recent Labs   Lab 12/01/22  0746 12/06/22  1419   WBC 5.1 6.7   RBC 4.63 4.82   HGB 8.6* 8.9*   HCT 32.2* 33.7*   MCV 69.5* 69.9*   MCH 18.6* 18.5*   MCHC 26.7* 26.4*   RDW 21.5* 21.4*    299       Recent Labs   Lab 12/01/22  0746 12/06/22  1419    143   K 4.6 4.1   CO2 33* 31   BUN 88.1* 89.6*   CREATININE 2.49* 2.23*   CALCIUM 9.1 9.9   ALBUMIN 2.8* 3.0*   ALKPHOS 149 126   ALT 29 25   AST 54* 45*   BILITOT 1.0 1.4       Microbiology Results (last 7 days)       ** No results found for the last 168 hours. **             X-Ray Chest PA And Lateral  Narrative: EXAMINATION:  XR CHEST PA AND LATERAL    CLINICAL HISTORY:  Shortness of Breath;, .    COMPARISON:  November 7, 2022    FINDINGS:  Examination reveals mediastinal silhouette to be within normal limits cardiac silhouette is not enlarged.    There is blunting of both costophrenic angles and posterior sulci indicating the presence of bilateral pleural effusions.    There is some increased left retrocardiac density and silhouetting of the left hemidiaphragm although these might be related to pleural fluid it may also represent an infiltrate/atelectasis    No other focal consolidative changes  Impression: Bilateral pleural effusions.    Increased left retrocardiac density and  silhouetting of the left hemidiaphragm as above    Electronically signed by: [place]  Date:    12/06/2022  Time:    15:08        ASSESSMENT & PLAN:     Acute on chronic decompensated systolic heart failure   -bilateral pleural effusions which should start responding to the diuresis   - echocardiogram has been ordered, follow-up on results   -patient has been initiated on IV Lasix for diuresis at 80 mg b.i.d. 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 daily has been resumed     History of hypertension   -monitor blood pressure closely  -patient's blood pressure on lower side of normal, getting large doses of IV Lasix, monitor blood pressure     Iron deficiency anemia   -a dose of injectefer was ordered per Renal   -concern for GI loss, occult blood in stool  -high possibility of being high considering patient being on Eliquis   -monitor H&H and transfuse p.r.n.    Valvular heart disease with MVR  -Stable      VTE Prophylaxis: Eliquis renal dosed      Patient condition:  Fair __________________________________________________________________________  INPATIENT LIST OF MEDICATIONS     Scheduled Meds:   atorvastatin  20 mg Oral Daily    clopidogreL  75 mg Oral Daily    docusate sodium  100 mg Oral BID    ferric carboxymaltose (INJECTAFER) IVPB  750 mg Intravenous Once    furosemide (LASIX) injection  80 mg Intravenous Q12H    hydrOXYzine HCL  25 mg Oral QHS    [START ON 12/8/2022] levothyroxine  50 mcg Oral Before breakfast    polyethylene glycol  17 g Oral Daily     rivaroxaban  2.5 mg Oral BID     Continuous Infusions:  PRN Meds:.acetaminophen, melatonin, morphine, morphine, ondansetron        All diagnosis and differential diagnosis have been reviewed; assessment and plan has been documented; I have personally reviewed the labs and test results that are presently available; I have reviewed the patients medication list; I have reviewed the consulting providers response and recommendations. I have reviewed or attempted to review medical records based upon their availability.    All of the patient and family questions have been addressed and answered. Patient's is agreeable to the above stated plan. I will continue to monitor closely and make adjustments to medical management as needed.      Tierra Jhonson MD   12/07/2022

## 2022-12-07 NOTE — ED NOTES
Pt given drink per request with diet order in place. Pt gcs 15. Denies pain at present. States discomfort from laying on back. Pt repositioned with a wedge and CBWR. Side rail x 2. Bed in low position. Pt gcs 15.

## 2022-12-07 NOTE — ED NOTES
Pt given bed bath and palced in hosp bed with heels floated. Kristan care given. Pt placed on Face mask @ 6L after morphine admin and bed making process.

## 2022-12-07 NOTE — CONSULTS
[facility] New Consult Note    Patient Name: Dee Haji  Admission Date: 12/6/2022  Hospital Length of Stay: 1 days  Code Status: Full Code   Attending Physician: Tierra Johnson MD   Primary Care Physician: Tierra Johnson MD  Principal Problem:<principal problem not specified>    HPI:    Dee Haji 88 y.o. female  has a past medical history of A-fib, CAD (coronary artery disease), CVA (cerebrovascular accident), Dyspnea on exertion, GERD (gastroesophageal reflux disease), HTN (hypertension), Mixed hyperlipidemia, Renal disorder, S/P AVR (aortic valve replacement), Synovial cyst of knee, TIA (transient ischemic attack), Unspecified hemorrhoids, and Unspecified osteoarthritis, unspecified site. presents for had concerns including Leg Swelling (C/o LE and UE swelling x1 week ago. Pt reports occasional SOB. Denies CP and fever. Sent by Dr. Almeida for fluid overload and admission. Hx CHF, on fluid pills. 90% on RA, 2L/min NC placed in triage. ). on 12/6/2022.       We have been consulted for renal management.  We sent patient to the hospital yesterday from our office for fluid volume overload.  She is known to have CKD 3B related to nephrosclerosis and cardiorenal disease.  She had reported a weight gain of over 20 lb in the last 2-3 weeks.  She was diuresed starting in the emergency room yesterday and today she reports feeling much better, less shortness of breath, less edema.  She does have a face mask on at 6 L however she received morphine this morning.        Medical History:   Past Medical History:   Diagnosis Date    A-fib     CAD (coronary artery disease)     CVA (cerebrovascular accident)     Dyspnea on exertion     GERD (gastroesophageal reflux disease)     HTN (hypertension)     Mixed hyperlipidemia     Renal disorder     S/P AVR (aortic valve replacement)     Synovial cyst of knee     TIA (transient ischemic attack)     Unspecified hemorrhoids     Unspecified osteoarthritis,  unspecified site        Surgical History:   Past Surgical History:   Procedure Laterality Date    ANGIOGRAM, CORONARY, WITH LEFT HEART CATHETERIZATION      BUNIONECTOMY Right     CATARACT EXTRACTION Right     CHOLECYSTECTOMY      CORONARY STENT PLACEMENT      ESOPHAGOGASTRODUODENOSCOPY N/A 2022    Procedure: EGD W/DIL;  Surgeon: CIARA Nunez MD;  Location: [facility] ENDOSCOPY;  Service: Gastroenterology;  Laterality: N/A;  48FR  Garcia    left lower lobectomy Left        Family History:   Family History   Problem Relation Age of Onset    Heart failure Mother     Cancer Father     Diabetes Sister     Diabetes Brother    .     Social History:   Social History     Tobacco Use    Smoking status: Former     Types: Cigarettes     Quit date: 1/3/1972     Years since quittin.9    Smokeless tobacco: Never    Tobacco comments:     quit in    Substance Use Topics    Alcohol use: Not Currently       Allergies:  Review of patient's allergies indicates:  No Known Allergies      Review of Systems:  Constitutional: +fatigue, generalized weakness, swelling  Skin: Denies wounds, no rashes, no itching, no new skin lesions  HEENT: Denies acute change in hearing or vision, tinnitus, or dysphagia  Respiratory:  Denies cough, +shortness of breath; no wheezing  Cardiovascular: Denies chest pain, palpitations; + generalized swelling  Gastrointestional: Denies abdominal pain, nausea, vomiting, diarrhea, or constipation; +denies occult blood loss  Genitourinary: Denies dysuria, hematuria; reports able to empty bladder  Musculoskeletal: Denies myalgias, back pain, decreased ROM or focal weakness  Neurological: Denies headaches, seizures, dizziness, paresthesias or weakness  Hematological: Denies unusual bruising or bleeding  Psychiatric: Denies hallucinations, depression, or confusion    Medications:    Current Facility-Administered Medications:     acetaminophen tablet 650 mg, 650 mg, Oral, Q8H PRN, Filemon Dickerson,  MD    melatonin tablet 6 mg, 6 mg, Oral, Nightly PRN, Filemon Dickerson MD    morphine injection 2 mg, 2 mg, Intravenous, Q4H PRN, Filemon Dickerson MD    morphine injection 4 mg, 4 mg, Intravenous, Q4H PRN, Filemon Dickerson MD, 4 mg at 12/07/22 0534    ondansetron injection 4 mg, 4 mg, Intravenous, Q8H PRN, Filemon Dickerson MD, 4 mg at 12/07/22 0534    polyethylene glycol packet 17 g, 17 g, Oral, Daily, Filemon Dickerson MD     Scheduled Meds:   polyethylene glycol  17 g Oral Daily     Continuous Infusions:      Objective:  /67   Pulse 78   Temp 97.7 °F (36.5 °C) (Oral)   Resp 18   Ht 5' (1.524 m)   Wt 54.4 kg (120 lb)   LMP  (LMP Unknown)   SpO2 100%   BMI 23.44 kg/m²  Body mass index is 23.44 kg/m².    I/O last 3 completed shifts:  In: -   Out: 1300 [Urine:1300]  No intake/output data recorded.        Physical Exam:  General: no acute distress, awake, alert; face mask on; pale  Eyes: PERRLA, EOMI, conjunctiva clear, eyelids without swelling  HENT: atraumatic, oropharynx and nasal mucosa patent, no difficulty hearing  Neck: full ROM, + JVD, no thyromegaly or lymphadenopathy  Respiratory: equal, unlabored; bilateral crackles A/P  Cardiovascular: RRR; + RANDALL 3/6; no rub; BL radial and pedal pulses felt  Edema:  Less swelling to hands and arms; bilateral lower extremities +3 to +4 pitting edema, less tense than yesterday  Gastrointestinal: soft, non-tender, non-distended; positive bowel sounds; no masses to palpation  Musculoskeletal:  Generalized weakness  Integumentary: warm, dry; no rashes, wounds, or skin lesions  Neurological: oriented, appropriate, no acute deficits    Labs:  Chemistries:   Recent Labs   Lab 12/01/22  0746 12/06/22  1419    143   K 4.6 4.1   CO2 33* 31   BUN 88.1* 89.6*   CREATININE 2.49* 2.23*   CALCIUM 9.1 9.9   BILITOT 1.0 1.4   ALKPHOS 149 126   ALT 29 25   AST 54* 45*   GLUCOSE 93 93   PHOS 4.1  --         CBC/Anemia Labs: [place]:    Recent Labs   Lab 12/01/22  0746  12/06/22  1419   WBC 5.1 6.7   HGB 8.6* 8.9*   HCT 32.2* 33.7*    299   MCV 69.5* 69.9*   RDW 21.5* 21.4*    Recent Labs   Lab 12/06/22  1419   INR 1.89*        POCT Glucose: HbA1c:      Impression:    Patient Active Problem List   Diagnosis    Arteriosclerosis of coronary artery    Peripheral vascular disease, unspecified    Stage 3a chronic kidney disease    A-fib    Gastroesophageal reflux disease    Mixed hyperlipidemia    Transient ischemic attack    HTN (hypertension)    S/P MVR (mitral valve replacement)    Dyspnea on exertion    Valvular heart disease    RENÉ (acute kidney injury)    Advance care planning    Hypothyroidism    Esophageal dysphagia    Dysphagia, oropharyngeal phase     Fluid volume overload   Decompensated CHF  Chest x-ray= bilateral pleural effusions  CKD 3B related to nephrosclerosis and cardiorenal disease  Worsening anemia low MCV and high BUN concerning for occult GI bleeding    Plan:  Echo  Iron studies  Stool occult blood  Continue Lasix IV diuresis  Labs in the a.m.    ANA LAURA Vazquez        Cardiorenal disease  Improved Creatinine related to improved preload  Volume status already improving with less edema and improved breathing  Still concerned with occult GI bleed explaining the iron deficiency anemia and high BUN  Will give one dose injectafer  Need to check stools occult blood    GABO lu    Thank you for this consult.

## 2022-12-07 NOTE — PLAN OF CARE
12/07/22 1011   Discharge Assessment   Assessment Type Discharge Planning Assessment   Confirmed/corrected address, phone number and insurance Yes   Confirmed Demographics Correct on Facesheet   Source of Information patient   Does patient/caregiver understand observation status Yes   Communicated JOO with patient/caregiver Yes;Date not available/Unable to determine   People in Home facility resident   Facility Arrived From: [facility]   Do you expect to return to your current living situation? Yes   Prior to hospitilization cognitive status: Alert/Oriented   Current cognitive status: Alert/Oriented   Walking or Climbing Stairs ambulation difficulty, requires equipment   Dressing/Bathing bathing difficulty, assistance 1 person   Equipment Currently Used at Home wheelchair   Readmission within 30 days? No   Do you currently have service(s) that help you manage your care at home? Yes   Name and Contact number of agency [facility]   Is the pt/caregiver preference to resume services with current agency Yes   Do you take prescription medications? Yes   Do you have prescription coverage? Yes   Coverage Medicare Part A & B and    Are you on dialysis? No   Discharge Plan A Assisted Living   Discharge Plan B Assisted Living   Discharge Plan discussed with: Patient

## 2022-12-07 NOTE — TELEPHONE ENCOUNTER
----- Message from Dawna Pak sent at 12/7/2022 10:09 AM CST -----  Regarding: FYI   called to let doc know that patient is in [facility]. In Room 315

## 2022-12-07 NOTE — ED PROVIDER NOTES
Encounter Date: 12/6/2022    SCRIBE #1 NOTE: I, Radha Parrish, am scribing for, and in the presence of,  Filemon Dickerson MD. I have scribed the following portions of the note - Other sections scribed: HPI, ROS, PE.     History     Chief Complaint   Patient presents with    Leg Swelling     C/o LE and UE swelling x1 week ago. Pt reports occasional SOB. Denies CP and fever. Sent by Dr. Almeida for fluid overload and admission. Hx CHF, on fluid pills. 90% on RA, 2L/min NC placed in triage.      88 year old female with a hx of Afib, CAD, CVA, CHF, GERD, HTN, HLD, TIA, stage 3 CKD, and 2 valve replacements presents to the ED from Dr. Almeida's office for possible fluid overload and admission. The patient reports recent weight gain, bilateral hand and foot swelling, and shortness of breath. The patient had an oxygen saturation of 90% on room air. She does not attend dialysis. She is on xarelto and plavix. Her PCP is Dr. Johnson.     The history is provided by the patient. No  was used.   Illness   The current episode started several days ago. The problem occurs continuously. The problem has been gradually worsening. Nothing relieves the symptoms. Associated symptoms include shortness of breath. Pertinent negatives include no fever, no abdominal pain, no diarrhea, no nausea, no vomiting, no cough and no rash. Recently, medical care has been given by a specialist (Dr. Almeida).   Review of patient's allergies indicates:  No Known Allergies  Past Medical History:   Diagnosis Date    A-fib     CAD (coronary artery disease)     CVA (cerebrovascular accident)     Dyspnea on exertion     GERD (gastroesophageal reflux disease)     HTN (hypertension)     Mixed hyperlipidemia     Renal disorder     S/P AVR (aortic valve replacement)     Synovial cyst of knee     TIA (transient ischemic attack)     Unspecified hemorrhoids     Unspecified osteoarthritis, unspecified site      Past Surgical History:   Procedure  Laterality Date    ANGIOGRAM, CORONARY, WITH LEFT HEART CATHETERIZATION      BUNIONECTOMY Right     CATARACT EXTRACTION Right     CHOLECYSTECTOMY      CORONARY STENT PLACEMENT      ESOPHAGOGASTRODUODENOSCOPY N/A 2022    Procedure: EGD W/DIL;  Surgeon: CIARA Nunez MD;  Location: [facility] ENDOSCOPY;  Service: Gastroenterology;  Laterality: N/A;  48FR  Garcia    left lower lobectomy Left      Family History   Problem Relation Age of Onset    Heart failure Mother     Cancer Father     Diabetes Sister     Diabetes Brother      Social History     Tobacco Use    Smoking status: Former     Types: Cigarettes     Quit date: 1/3/1972     Years since quittin.9    Smokeless tobacco: Never    Tobacco comments:     quit in    Substance Use Topics    Alcohol use: Not Currently    Drug use: Not Currently     Review of Systems   Constitutional:  Positive for unexpected weight change. Negative for chills and fever.   HENT:  Negative for sinus pain.    Respiratory:  Positive for shortness of breath. Negative for cough and chest tightness.    Cardiovascular:  Positive for leg swelling. Negative for chest pain.   Gastrointestinal:  Negative for abdominal pain, diarrhea, nausea and vomiting.   Genitourinary:  Negative for dysuria and hematuria.   Musculoskeletal:         Hand swelling    Skin:  Negative for rash.   Neurological:  Negative for syncope and weakness.   All other systems reviewed and are negative.    Physical Exam     Initial Vitals [22 1350]   BP Pulse Resp Temp SpO2   98/66 88 18 97.7 °F (36.5 °C) (!) 91 %      MAP       --         Physical Exam    Nursing note and vitals reviewed.  Constitutional: She appears well-developed and well-nourished. No distress.   HENT:   Head: Normocephalic and atraumatic.   Eyes: EOM are normal. Pupils are equal, round, and reactive to light.   Cardiovascular:  Normal rate, regular rhythm, normal heart sounds and intact distal pulses.            Pulmonary/Chest: No respiratory distress.   Crackles bilaterally, on nasal cannula    Abdominal: Abdomen is soft. Bowel sounds are normal. There is no abdominal tenderness. There is no rebound and no guarding.   Musculoskeletal:         General: Normal range of motion.      Right lower leg: Pitting Edema present.      Left lower leg: Pitting Edema present.      Comments: Non-pitting edema to the hands      Neurological: She is alert. She has normal strength.   Skin: Skin is warm and dry.   Psychiatric: She has a normal mood and affect.       ED Course   Critical Care    Date/Time: 12/6/2022 6:50 PM  Performed by: Filemon Dickerson MD  Authorized by: Tierra Johnson MD   Total critical care time (exclusive of procedural time) : 0 minutes  Critical care was necessary to treat or prevent imminent or life-threatening deterioration of the following conditions: cardiac failure, circulatory failure, renal failure and respiratory failure.  Critical care was time spent personally by me on the following activities: discussions with consultants, discussions with primary provider, interpretation of cardiac output measurements, evaluation of patient's response to treatment, examination of patient, obtaining history from patient or surrogate, re-evaluation of patient's condition, pulse oximetry, ordering and review of radiographic studies and ordering and review of laboratory studies.      Labs Reviewed   COMPREHENSIVE METABOLIC PANEL - Abnormal; Notable for the following components:       Result Value    Blood Urea Nitrogen 89.6 (*)     Creatinine 2.23 (*)     Albumin Level 3.0 (*)     Globulin 3.6 (*)     Albumin/Globulin Ratio 0.8 (*)     Aspartate Aminotransferase 45 (*)     All other components within normal limits   B-TYPE NATRIURETIC PEPTIDE - Abnormal; Notable for the following components:    Natriuretic Peptide 4,952.5 (*)     All other components within normal limits   TROPONIN I - Abnormal; Notable for the  following components:    Troponin-I 0.117 (*)     All other components within normal limits   CBC WITH DIFFERENTIAL - Abnormal; Notable for the following components:    Hgb 8.9 (*)     Hct 33.7 (*)     MCV 69.9 (*)     MCH 18.5 (*)     MCHC 26.4 (*)     RDW 21.4 (*)     All other components within normal limits   PROTIME-INR - Abnormal; Notable for the following components:    PT 21.4 (*)     INR 1.89 (*)     All other components within normal limits   CBC W/ AUTO DIFFERENTIAL    Narrative:     The following orders were created for panel order CBC Auto Differential.  Procedure                               Abnormality         Status                     ---------                               -----------         ------                     CBC with Differential[[id number]]        Abnormal            Final result                 Please view results for these tests on the individual orders.          Imaging Results              X-Ray Chest PA And Lateral (Final result)  Result time 12/06/22 15:08:14      Final result by South De Jesus MD (12/06/22 15:08:14)                   Impression:      Bilateral pleural effusions.    Increased left retrocardiac density and silhouetting of the left hemidiaphragm as above      Electronically signed by: South De Jesus  Date:    12/06/2022  Time:    15:08               Narrative:    EXAMINATION:  XR CHEST PA AND LATERAL    CLINICAL HISTORY:  Shortness of Breath;, .    COMPARISON:  November 7, 2022    FINDINGS:  Examination reveals mediastinal silhouette to be within normal limits cardiac silhouette is not enlarged.    There is blunting of both costophrenic angles and posterior sulci indicating the presence of bilateral pleural effusions.    There is some increased left retrocardiac density and silhouetting of the left hemidiaphragm although these might be related to pleural fluid it may also represent an infiltrate/atelectasis    No other focal consolidative changes                                        Medications   furosemide injection 60 mg (60 mg Intravenous Given 12/6/22 1821)     Medical Decision Making:   Differential Diagnosis:   MI, CHF exacerbation, acute renal failure, volume overload, hypoxia  Clinical Tests:   Lab Tests: Ordered and Reviewed  Radiological Study: Ordered and Reviewed  ED Management:  Volume overload worsening CKD.  BNP is significantly elevated despite home torsemide.  She is crackles on exam pleural effusions on x-ray.  Started on 60 of IV Lasix.  Supplemental oxygen to keep sats over 94%.  Minimal elevation of troponin 0.1 likely from CHF exacerbation.  Will trend enzymes.  She is already on Plavix and Xarelto and aspirin.  I will hold further anticoagulation unless troponin elevates.  Discussed with Dr. Almeida this morning he advised us he was sending her in.  Discussed with Internal Medicine who will admit        Scribe Attestation:   Scribe #1: I performed the above scribed service and the documentation accurately describes the services I performed. I attest to the accuracy of the note.    Attending Attestation:           Physician Attestation for Scribe:  Physician Attestation Statement for Scribe #1: I, reviewed documentation, as scribed by Radha Parrish in my presence, and it is both accurate and complete.           ED Course as of 12/06/22 1932   Tue Dec 06, 2022   1818 Discussed the case with Dr. Alcantara.  Will admit. [LF]      ED Course User Index  [LF] Filemon Dickerson MD                 Clinical Impression:   Final diagnoses:  [R06.02] SOB (shortness of breath)  [R60.0] Bilateral lower extremity edema (Primary)  [J90] Pleural effusion  [E87.70] Hypervolemia, unspecified hypervolemia type  [R01.1] Heart murmur  [N17.9, N18.9] Acute kidney injury superimposed on CKD  [R09.02] Hypoxia        ED Disposition Condition    Admit Stable                Filemon Dickerson MD  12/06/22 1851       Filemon Dickerson MD  12/06/22 1932

## 2022-12-08 PROBLEM — N18.9 ACUTE KIDNEY INJURY SUPERIMPOSED ON CKD: Status: ACTIVE | Noted: 2022-01-01

## 2022-12-08 PROBLEM — R06.02 SOB (SHORTNESS OF BREATH): Status: ACTIVE | Noted: 2022-01-01

## 2022-12-08 NOTE — PROGRESS NOTES
[facility] Inpatient Progress Note      HPI:     Patient Name: Dee Haji  Admission Date: 12/6/2022  Hospital Length of Stay: 2 days  Code Status: Full Code   Attending Physician: Tierra Johnson MD   Primary Care Physician: Tierra Johnson MD  Principal Problem:<principal problem not specified>      Today pt seen and examined  Labs, events, imaging and meds reviewed for this hospital stay  Feels better  Breathing better        Review of Systems:   Constitutional: less weakness, breathing better  Skin: Denies wounds, no rashes, no itching, no new skin lesions  HEENT: Denies acute change in hearing or vision, tinnitus, or dysphagia  Respiratory:  Denies cough, still requiring some oxygen  Cardiovascular: Denies chest pain, palpitations, or swelling  Gastrointestional: Denies abdominal pain, nausea, vomiting, diarrhea, or constipation  Genitourinary: Denies dysuria, hematuria, or incontinence; reports able to empty bladder  Musculoskeletal: Denies myalgias, back pain, decreased ROM or focal weakness  Neurological: Denies headaches, seizures, dizziness, paresthesias or weakness  Hematological: Denies unusual bruising or bleeding  Psychiatric: Denies hallucinations, depression, or confusion      Medications:   Scheduled Meds:   atorvastatin  20 mg Oral Daily    clopidogreL  75 mg Oral Daily    docusate sodium  100 mg Oral BID    epoetin soledad-ebpx (RETACRIT) injection  20,000 Units Subcutaneous Once    hydrOXYzine HCL  25 mg Oral QHS    levothyroxine  50 mcg Oral Before breakfast    polyethylene glycol  17 g Oral Daily    rivaroxaban  2.5 mg Oral BID    torsemide  20 mg Oral Daily     Continuous Infusions:      Vitals:     Vitals:    12/07/22 2250 12/08/22 0001 12/08/22 0355 12/08/22 0729   BP: (!) 97/57 108/67 102/62 99/66   Pulse:   85 82   Resp:       Temp:  98 °F (36.7 °C) 97.7 °F (36.5 °C) 97.7 °F (36.5 °C)   TempSrc:  Oral Oral Oral   SpO2:   100% 100%   Weight:       Height:             I/O  last 3 completed shifts:  In: 580 [P.O.:580]  Out: 2625 [Urine:2625]    Intake/Output Summary (Last 24 hours) at 12/8/2022 0923  Last data filed at 12/8/2022 0444  Gross per 24 hour   Intake 580 ml   Output 1325 ml   Net -745 ml       Physical Exam:   General: no acute distress, awake, alert, on O2 face charli  Eyes: PERRLA, EOMI, conjunctiva clear, eyelids without swelling  HENT: atraumatic, oropharynx and nasal mucosa patent  Neck: full ROM, + JVD, no thyromegaly or lymphadenopathy  Respiratory: equal, unlabored, fine basilar rhonchi  Cardiovascular: RRR , RANDALL g3/6 LSB; BL radial and pedal pulses felt  Edema: significant decrease peripherally,( +1)  Gastrointestinal: soft, non-tender, non-distended; positive bowel sounds; no masses to palpation  Genitourinary: no CVA tenderness upon palpation  Musculoskeletal: ROM without new  limitation or discomfort  Integumentary: warm, dry; no rashes, wounds, or  new skin lesions  Neurological: oriented, appropriate, no acute deficits        Labs:     Cardiac Enzymes: Ejection Fractions:    Recent Labs     12/06/22  1419   TROPONINI 0.117*    EF   Date Value Ref Range Status   12/07/2022 50 % Final        Chemistries:   Recent Labs   Lab 12/06/22  1419 12/08/22  0403    145   K 4.1 3.8   CO2 31 37*   BUN 89.6* 77.9*   CREATININE 2.23* 1.86*   CALCIUM 9.9 9.7   BILITOT 1.4 1.0   ALKPHOS 126 104   ALT 25 17   AST 45* 39*   GLUCOSE 93 100        CBC/Anemia Labs: Coags:    Recent Labs   Lab 12/06/22  1419 12/08/22  0403   WBC 6.7 7.9   HGB 8.9* 8.0*   HCT 33.7* 30.8*    219   MCV 69.9* 70.3*   RDW 21.4* 20.7*   IRON 24*  --    FERRITIN 21.35  --     Recent Labs   Lab 12/06/22  1419   INR 1.89*        POCT Glucose: HbA1c:    No results for input(s): POCTGLUCOSE in the last 168 hours. Hemoglobin A1c   Date Value Ref Range Status   01/25/2022 5.0 <<=7.0 % Final          Impression:       Patient Active Problem List   Diagnosis    Arteriosclerosis of coronary artery     Peripheral vascular disease, unspecified    Stage 3a chronic kidney disease    A-fib    Gastroesophageal reflux disease    Mixed hyperlipidemia    Transient ischemic attack    HTN (hypertension)    S/P MVR (mitral valve replacement)    Dyspnea on exertion    Valvular heart disease    RENÉ (acute kidney injury)    Advance care planning    Hypothyroidism    Esophageal dysphagia    Dysphagia, oropharyngeal phase    Acute on chronic combined systolic and diastolic heart failure     Severe cardiorenal disease  Volume overload better  RENÉ better since Preload decreased ( improved CO)  Met alkalosis related to diuresis  Anemia and iron deficiency, concerning for occult GI bleed, there is also a component of anemia related to CKD    Plan:     Pt received injectafer yesterday  Will give one dose of procrit today  Change diuresis to PO torsemide ( lower dose)  Wean O2\  Hopefully DC soon       Antonio Almeida

## 2022-12-08 NOTE — PROGRESS NOTES
[facility] Hospital Medicine Progress Note        Chief Complaint: Inpatient Follow-up for volume overload      HPI: Dee Haji is a 88 y.o. female who  has a past medical history of A-fib, CAD (coronary artery disease), CVA (cerebrovascular accident), Dyspnea on exertion, GERD (gastroesophageal reflux disease), HTN (hypertension), Mixed hyperlipidemia, Renal disorder, S/P AVR (aortic valve replacement), Synovial cyst of knee, TIA (transient ischemic attack), Unspecified hemorrhoids, and Unspecified osteoarthritis, unspecified site.. The patient presented to [facility] on 12/6/2022 with a primary complaint of worsening shortness of breath     She was actually directed to the hospital after she was sent by Dr. Almeida and noted to be and fluid overload.  Her oxygen saturation was noted to be at 90% on room air.  Patient appears to be going into cardiorenal disease and that has been multiple conversations regarding her frail status with discussions regarding possible transition to palliative care depending on how she does.    After she was sent to emergency room, patient was initiated on IV Lasix at 80 mg b.i.d  It well with diuresis and was eventually switched back to torsemide.    Interval Hx:   The patient was seen and examined.  No family members at bedside.  She was dozing off since she did not sleep very well at night.  Nursing staff advised to get trazodone ordered for insomnia.    Overall feeling much better after diuresis     Objective/physical exam:  General: In no acute distress, afebrile  Chest:  Breath sounds much improved, decreased fine crackles in bilateral bases, improved from yesterday  Heart: RRR, +S1, S2, E/6 systolic murmur   Abdomen: Soft, nontender, BS +  MSK: Warm, improving edema daily, 1+ bilaterally, no clubbing or cyanosis  Neurologic:  nonfocal    VITAL SIGNS: 24 HRS MIN & MAX LAST   Temp  Min: 97.4 °F (36.3 °C)  Max: 98.1 °F (36.7 °C) 97.9 °F (36.6 °C)   BP   Min: 95/55  Max: 123/69 103/63   Pulse  Min: 76  Max: 85  76   Resp  Min: 18  Max: 18 18   SpO2  Min: 90 %  Max: 100 % 99 %       Recent Labs   Lab 12/06/22  1419 12/08/22  0403   WBC 6.7 7.9   RBC 4.82 4.38   HGB 8.9* 8.0*   HCT 33.7* 30.8*   MCV 69.9* 70.3*   MCH 18.5* 18.3*   MCHC 26.4* 26.0*   RDW 21.4* 20.7*    219       Recent Labs   Lab 12/06/22  1419 12/08/22  0403    145   K 4.1 3.8   CO2 31 37*   BUN 89.6* 77.9*   CREATININE 2.23* 1.86*   CALCIUM 9.9 9.7   ALBUMIN 3.0* 2.5*   ALKPHOS 126 104   ALT 25 17   AST 45* 39*   BILITOT 1.4 1.0          Microbiology Results (last 7 days)       ** No results found for the last 168 hours. **             See below for Radiology    Scheduled Med:   atorvastatin  20 mg Oral Daily    clopidogreL  75 mg Oral Daily    docusate sodium  100 mg Oral BID    hydrOXYzine HCL  25 mg Oral QHS    levothyroxine  50 mcg Oral Before breakfast    polyethylene glycol  17 g Oral Daily    rivaroxaban  2.5 mg Oral BID    torsemide  20 mg Oral Daily    trazodone  50 mg Oral QHS        Continuous Infusions:       PRN Meds:  acetaminophen, melatonin, morphine, morphine, ondansetron       Assessment/Plan:  Acute on chronic decompensated systolic heart failure   -significant improvement in volume status with IV diuresis with Lasix.    -switched over to oral torsemide and IV Lasix discontinued per Renal   -continue to monitor I/O accurately      Stage IIIB CKD with acute kidney injury  -cardiorenal disease  -has been evaluated by Nephrology, currently undergoing diuresis, monitor renal indices closely , now switched to p.o. diuresis  -avoid nephrotoxic medications and renally dose all meds      History of atrial fibrillation   -home medications have been resumed   -patient on renal does anticoagulation with Eliquis      Hypothyroidism   -home medication levothyroxine 50 mcg p.o. daily has been resumed     History of hypertension   -monitor blood pressure closely  -patient's blood  pressure on lower side of normal, getting large doses of IV Lasix, monitor blood pressure      Iron deficiency anemia   -sp injectefer on 12/07 2022, Procrit ordered today per Renal   -concern for GI loss, occult blood in stool  -high possibility of being high considering patient being on Eliquis   -monitor H&H and transfuse p.r.n.     Valvular heart disease with MVR  -Stable    VTE prophylaxis: renal dose Eliquis    Patient condition:  Fait    Anticipated discharge and Disposition:   Home with  when stable       All diagnosis and differential diagnosis have been reviewed; assessment and plan has been documented; I have personally reviewed the labs and test results that are presently available; I have reviewed the patients medication list; I have reviewed the consulting providers response and recommendations. I have reviewed or attempted to review medical records based upon their availability    All of the patient's questions have been  addressed and answered. Patient's is agreeable to the above stated plan. I will continue to monitor closely and make adjustments to medical management as needed.  _____________________________________________________________________    Nutrition Status:    Radiology:  X-Ray Shoulder 1 View Right  Narrative: EXAMINATION:  XR SHOULDER 1 VIEW RIGHT    CLINICAL HISTORY:  Pain;    TECHNIQUE:  Frontal view of the right shoulder.    COMPARISON:  None    FINDINGS:  Limited assessment on this single radiograph.  No gross evidence of fracture or dislocation.  However evaluation of alignment is limited without orthogonal view.  Unchanged subtle rib contour deformities.    Unchanged right pleural effusion versus pleural thickening.  Impression: No appreciable acute osseous abnormality on this single radiograph.    Electronically signed by: Joceline Quiñones  Date:    12/08/2022  Time:    14:48  Echo  · The estimated ejection fraction is 40-45%.  · Eccentric hypertrophy and low normal systolic  function.  · Indeterminate left ventricular diastolic function.  · Mild to moderate left atrial enlargement.  · Severe right ventricular enlargement.  · Likely mechanical prosthesis present.  · The mean diastolic gradient across the mitral valve is 10 mmHg at a   heart rate of 75 bpm.  · Mild mitral regurgitation.  · There is a bioprosthetic aortic valve present. There is mild central   aortic insufficiency present.  · The aortic valve mean gradient is 10 mmHg with a dimensionless index of   0.44.  · Moderate tricuspid regurgitation.  · Elevated central venous pressure (15 mmHg).  · The estimated PA systolic pressure is 60 mmHg.  · There is pulmonary hypertension.         Tierra Johnson MD   12/08/2022

## 2022-12-08 NOTE — PROGRESS NOTES
"Inpatient Nutrition Evaluation    Admit Date: 2022   Total duration of encounter: 2 days    Nutrition Recommendation/Prescription     -Continue diet as ordered and tolerated.   -Assistance with meals as needed.     Nutrition Assessment     Chart Review    Reason Seen: continuous nutrition monitoring    Diagnosis:  Acute on chronic decompensated systolic heart failure   Stage IIIB CKD with acute kidney injury  Hypothyroidism   History of hypertension   Iron deficiency anemia     Nutrition-Related Medications: docusate sodium, levothyroxine, miralax, torsemide. PRN: ondansetron    Nutrition-Related Labs:  22 CO2 37, BUN 77.9, Crea 1.86, GFR 26, Total pro 5.5, Alb 2.5, AST 39    Diet Order: DIET RENAL NON-DIALYSIS  Oral Supplement Order: none  Appetite/Oral Intake: good/50-75% of meals, her normal   Factors Affecting Nutritional Intake: none identified  Food/Restoration/Cultural Preferences: none reported  Food Allergies: none reported       Wound(s):   none documented    Comments    22 Good appetite.  does mention pt needs to be "Set up" to eat, tray pushed close but can feed herself. Denies any abdominal pain, nausea or vomiting. No BM since admit, on stool softeners. On diuretics. Anticipate decrease in wt closer to dry UBW (190lb).     Anthropometrics    Height: 5' (152.4 cm)    Last Weight: 54.4 kg (120 lb) (22 1350)    BMI (Calculated): 23.4  BMI Classification: normal (BMI 18.5-24.9)     Ideal Body Weight (IBW), Female: 100 lb     % Ideal Body Weight, Female (lb): 120 %                    Usual Body Weight (UBW), k.5 kg  % Usual Body Weight: 110.19     Usual Weight Provided By: patient    Wt Readings from Last 5 Encounters:   22 54.4 kg (120 lb)   22 54.6 kg (120 lb 5.9 oz)   22 47.6 kg (105 lb)   22 48.9 kg (107 lb 12.9 oz)   22 48.3 kg (106 lb 6.4 oz)     Weight Change(s) Since Admission:  Admit Weight: 54.4 kg (120 lb) (22 1350)  22 " 54.4kg admit wt     Patient Education    Not applicable.    Monitoring & Evaluation     Dietitian will monitor food and beverage intake and weight change.  Nutrition Risk/Follow-Up: low (follow-up in 5-7 days)  Patients assigned 'low nutrition risk' status do not qualify for a full nutritional assessment but will be monitored and re-evaluated in a 5-7 day time period. Please consult if re-evaluation needed sooner.

## 2022-12-08 NOTE — NURSING
Nurses Note -- 4 Eyes      12/8/2022   11:06 AM      Skin assessed during: Admit      [x] No Pressure Injuries Present    []Prevention Measures Documented      [] Yes- Altered Skin Integrity Present or Discovered   [] LDA Added if Not in Epic (Describe Wound)   [] New Altered Skin Integrity was Present on Admit and Documented in LDA   [] Wound Image Taken    Wound Care Consulted? No    Attending Nurse:  Rona Gutierrez RN     Second RN/Staff Member:  Osbaldo Clay RN

## 2022-12-09 NOTE — PROGRESS NOTES
[facility] Inpatient Progress Note      HPI:     Patient Name: Dee Haji  Admission Date: 12/6/2022  Hospital Length of Stay: 3 days  Code Status: Full Code   Attending Physician: Tierra Johnson MD   Primary Care Physician: Tierra Johnson MD  Principal Problem:Acute on chronic combined systolic and diastolic heart failure      Today pt seen and examined  Labs, events, imaging and meds reviewed for this hospital stay  Feels the same  Maybe more dyspnea  Still 02 dependent  More edema    Review of Systems:   Constitutional: more dyspnea  Skin: Denies wounds, no rashes, no itching, no new skin lesions  HEENT: Denies acute change in hearing or vision, tinnitus, or dysphagia  Respiratory:  Denies cough, shortness of breath, or wheezing  Cardiovascular: Denies chest pain, palpitations, +++increase edema  Gastrointestional: Denies abdominal pain, nausea, vomiting, diarrhea, or constipation  Genitourinary: Denies dysuria, hematuria, or incontinence; reports able to empty bladder  Musculoskeletal: Denies myalgias, back pain, decreased ROM or focal weakness  Neurological: Denies headaches, seizures, dizziness, paresthesias or weakness  Hematological: Denies unusual bruising or bleeding  Psychiatric: Denies hallucinations, depression, or confusion      Medications:   Scheduled Meds:   acetaZOLAMIDE  250 mg Oral Once    atorvastatin  20 mg Oral Daily    clopidogreL  75 mg Oral Daily    docusate sodium  100 mg Oral BID    hydrOXYzine HCL  25 mg Oral QHS    levothyroxine  50 mcg Oral Before breakfast    metOLazone  5 mg Oral Daily    polyethylene glycol  17 g Oral Daily    potassium chloride  20 mEq Oral Daily    rivaroxaban  2.5 mg Oral BID    torsemide  20 mg Oral BID loop    trazodone  50 mg Oral QHS     Continuous Infusions:      Vitals:     Vitals:    12/08/22 2357 12/09/22 0457 12/09/22 0724 12/09/22 0907   BP: (!) 93/56 (!) 94/57 (!) 105/58 (!) 105/58   Pulse: 82 80 79    Resp: 19 19 18    Temp:  97.8 °F (36.6 °C) 97.8 °F (36.6 °C) 97.8 °F (36.6 °C)    TempSrc: Oral Oral Oral    SpO2: 100% 100% 98%    Weight:       Height:             I/O last 3 completed shifts:  In: 600 [P.O.:600]  Out: 2125 [Urine:2125]    Intake/Output Summary (Last 24 hours) at 12/9/2022 0913  Last data filed at 12/9/2022 0427  Gross per 24 hour   Intake 360 ml   Output 1000 ml   Net -640 ml       Physical Exam:   General: no acute distress, awake, alert, on OXYGEN  Eyes: PERRLA, EOMI, conjunctiva clear, eyelids without swelling  HENT: atraumatic, oropharynx and nasal mucosa patent  Neck: full ROM,+JVD, no thyromegaly or lymphadenopathy  Respiratory: rhonchi and dec BS both bases  Cardiovascular: RRR ++RANDALL BL radial and pedal pulses felt  Edema: worsening (+2 l;ower ext)  Gastrointestinal: soft, non-tender, non-distended; positive bowel sounds; no masses to palpation  Genitourinary: no CVA tenderness upon palpation  Integumentary: warm, dry; no rashes, wounds, or skin lesions  Neurological: oriented, appropriate, no acute deficits        Labs:     Cardiac Enzymes: Ejection Fractions:    Recent Labs     12/06/22  1419   TROPONINI 0.117*    EF   Date Value Ref Range Status   12/07/2022 50 % Final        Chemistries:   Recent Labs   Lab 12/06/22  1419 12/08/22  0403 12/09/22  0740    145 147*   K 4.1 3.8 3.7   CO2 31 37* 38*   BUN 89.6* 77.9* 65.2*   CREATININE 2.23* 1.86* 1.61*   CALCIUM 9.9 9.7 9.4   BILITOT 1.4 1.0 1.0   ALKPHOS 126 104 119   ALT 25 17 18   AST 45* 39* 47*   GLUCOSE 93 100 91   MG  --   --  1.90        CBC/Anemia Labs: Coags:    Recent Labs   Lab 12/06/22  1419 12/08/22  0403 12/09/22  0740   WBC 6.7 7.9 6.8   HGB 8.9* 8.0* 8.8*   HCT 33.7* 30.8* 34.2*    219 207   MCV 69.9* 70.3* 71.0*   RDW 21.4* 20.7* 21.4*   IRON 24*  --   --    FERRITIN 21.35  --   --     Recent Labs   Lab 12/06/22  1419   INR 1.89*        POCT Glucose: HbA1c:    No results for input(s): POCTGLUCOSE in the last 168 hours. Hemoglobin  A1c   Date Value Ref Range Status   01/25/2022 5.0 <<=7.0 % Final          Impression:     CXR Worse  Patient Active Problem List   Diagnosis    Arteriosclerosis of coronary artery    Peripheral vascular disease, unspecified    Stage 3a chronic kidney disease    A-fib    Gastroesophageal reflux disease    Mixed hyperlipidemia    Transient ischemic attack    HTN (hypertension)    S/P MVR (mitral valve replacement)    SOB (shortness of breath)    Valvular heart disease    Acute kidney injury superimposed on CKD    Advance care planning    Hypothyroidism    Esophageal dysphagia    Dysphagia, oropharyngeal phase    Acute on chronic combined systolic and diastolic heart failure     Unfortunately worsening volume overload  Alkalosis and hypernatremia    Renal function relatively stable    Plan:     Add metolazone  Increase torsemide to bid  KLC PO  One dose diamox  Labs in am       Antonio Almeida

## 2022-12-09 NOTE — PROGRESS NOTES
[facility] Hospital Medicine Progress Note        Chief Complaint: Inpatient Follow-up for     HPI:   Dee Haji is a 88 y.o. female who  has a past medical history of A-fib, CAD (coronary artery disease), CVA (cerebrovascular accident), Dyspnea on exertion, GERD (gastroesophageal reflux disease), HTN (hypertension), Mixed hyperlipidemia, Renal disorder, S/P AVR (aortic valve replacement), Synovial cyst of knee, TIA (transient ischemic attack), Unspecified hemorrhoids, and Unspecified osteoarthritis, unspecified site.. The patient presented to [facility] on 12/6/2022 with a primary complaint of worsening shortness of breath     She was actually directed to the hospital after she was sent by Dr. Almeida and noted to be and fluid overload.  Her oxygen saturation was noted to be at 90% on room air.  Patient appears to be going into cardiorenal disease and that has been multiple conversations regarding her frail status with discussions regarding possible transition to palliative care depending on how she does.    After she was sent to emergency room, patient was initiated on IV Lasix at 80 mg b.i.d  It well with diuresis and was eventually switched back to torsemide.    Interval Hx:   The patient was seen and examined.  Still appears somewhat deconditioned and short of breath.  Bilateral lower extremity swelling is also noted to be worse after she was transitioned to oral torsemide on 12/08/2022.  I will go ahead and increase it to 20 mg BID and continue to diurese her in-house.    Nursing staff advised to get her out of bed, therapy services will be consulted if needed.    Objective/physical exam:  General:  Appears tired and very deconditioned   Chest:  Scattered crackles, decreased in bilateral bases    Heart: RRR, +S1, S2, 3/6 systolic murmur   Abdomen: Soft, nontender, BS +  MSK: Warm, no lower extremity edema, no clubbing or cyanosis  Neurologic: Alert and oriented x4, Cranial nerve  II-XII intact, Strength 5/5 in all 4 extremities    VITAL SIGNS: 24 HRS MIN & MAX LAST   Temp  Min: 97.4 °F (36.3 °C)  Max: 97.9 °F (36.6 °C) 97.8 °F (36.6 °C)   BP  Min: 93/56  Max: 123/69 (!) 105/58     Pulse  Min: 68  Max: 82  79   Resp  Min: 18  Max: 19 18   SpO2  Min: 94 %  Max: 100 % 98 %       Recent Labs   Lab 12/06/22  1419 12/08/22  0403 12/09/22  0740   WBC 6.7 7.9 6.8   RBC 4.82 4.38 4.82   HGB 8.9* 8.0* 8.8*   HCT 33.7* 30.8* 34.2*   MCV 69.9* 70.3* 71.0*   MCH 18.5* 18.3* 18.3*   MCHC 26.4* 26.0* 25.7*   RDW 21.4* 20.7* 21.4*    219 207       Recent Labs   Lab 12/06/22  1419 12/08/22  0403 12/09/22  0740    145 147*   K 4.1 3.8 3.7   CO2 31 37* 38*   BUN 89.6* 77.9* 65.2*   CREATININE 2.23* 1.86* 1.61*   CALCIUM 9.9 9.7 9.4   MG  --   --  1.90   ALBUMIN 3.0* 2.5* 2.6*   ALKPHOS 126 104 119   ALT 25 17 18   AST 45* 39* 47*   BILITOT 1.4 1.0 1.0          Microbiology Results (last 7 days)       ** No results found for the last 168 hours. **             See below for Radiology    Scheduled Med:   acetaZOLAMIDE  250 mg Oral Once    atorvastatin  20 mg Oral Daily    clopidogreL  75 mg Oral Daily    docusate sodium  100 mg Oral BID    hydrOXYzine HCL  25 mg Oral QHS    levothyroxine  50 mcg Oral Before breakfast    metOLazone  5 mg Oral Daily    polyethylene glycol  17 g Oral Daily    potassium chloride  20 mEq Oral Daily    rivaroxaban  2.5 mg Oral BID    torsemide  20 mg Oral BID loop    trazodone  50 mg Oral QHS        Continuous Infusions:       PRN Meds:  acetaminophen, melatonin, morphine, morphine, ondansetron       Assessment/Plan:  Acute on chronic decompensated systolic heart failure   -significant improvement in volume status with IV diuresis with Lasix.    -switched over to oral torsemide and IV Lasix discontinued per Renal   -continue to monitor I/O accurately   -echo ordered and was noted to show an ejection fraction of 40-45%     Stage IIIB CKD with acute kidney injury  -advanced  cardiorenal disease  -has been evaluated by Nephrology, currently undergoing diuresis, monitor renal indices closely   -worsening edema and chest x-ray after switching to oral torsemide   -renal adjusting diuresis with increasing torsemide to 20 mg p.o. b.i.d., metolazone was added to help as well.    -avoid nephrotoxic medications and renally dose all meds      History of atrial fibrillation   -home medications have been resumed   -patient on renal does anticoagulation with Eliquis      Hypothyroidism   -home medication levothyroxine 50 mcg p.o. daily has been resumed     History of hypertension   -monitor blood pressure closely  -patient's blood pressure on lower side of normal, getting large doses of IV Lasix, monitor blood pressure      Iron deficiency anemia   -sp injectefer on 12/07 2022, Procrit 1 dose  -concern for GI loss, occult blood in stool  -high possibility  considering patient being on Eliquis   -monitor H&H and transfuse p.r.n.     Valvular heart disease with MVR  -Stable    VTE prophylaxis:  Eliquis, renal dose    Patient condition:  Fair    Anticipated discharge and Disposition:   Home with home health when stable      All diagnosis and differential diagnosis have been reviewed; assessment and plan has been documented; I have personally reviewed the labs and test results that are presently available; I have reviewed the patients medication list; I have reviewed the consulting providers response and recommendations. I have reviewed or attempted to review medical records based upon their availability    All of the patient's questions have been  addressed and answered. Patient's is agreeable to the above stated plan. I will continue to monitor closely and make adjustments to medical management as needed.  _____________________________________________________________________    Nutrition Status:    Radiology:  X-Ray Shoulder 1 View Right  Narrative: EXAMINATION:  XR SHOULDER 1 VIEW RIGHT    CLINICAL  HISTORY:  Pain;    TECHNIQUE:  Frontal view of the right shoulder.    COMPARISON:  None    FINDINGS:  Limited assessment on this single radiograph.  No gross evidence of fracture or dislocation.  However evaluation of alignment is limited without orthogonal view.  Unchanged subtle rib contour deformities.    Unchanged right pleural effusion versus pleural thickening.  Impression: No appreciable acute osseous abnormality on this single radiograph.    Electronically signed by: Joceline Quiñones  Date:    12/08/2022  Time:    14:48  Echo  · The estimated ejection fraction is 40-45%.  · Eccentric hypertrophy and low normal systolic function.  · Indeterminate left ventricular diastolic function.  · Mild to moderate left atrial enlargement.  · Severe right ventricular enlargement.  · Likely mechanical prosthesis present.  · The mean diastolic gradient across the mitral valve is 10 mmHg at a   heart rate of 75 bpm.  · Mild mitral regurgitation.  · There is a bioprosthetic aortic valve present. There is mild central   aortic insufficiency present.  · The aortic valve mean gradient is 10 mmHg with a dimensionless index of   0.44.  · Moderate tricuspid regurgitation.  · Elevated central venous pressure (15 mmHg).  · The estimated PA systolic pressure is 60 mmHg.  · There is pulmonary hypertension.         Tierra Johnson MD   12/09/2022

## 2022-12-10 PROBLEM — D64.9 ANEMIA: Status: ACTIVE | Noted: 2022-01-01

## 2022-12-10 NOTE — SUBJECTIVE & OBJECTIVE
Interval History: Patient reports she is feeling good.  No new complaints.    Review of Systems  Objective:     Vital Signs (Most Recent):  Temp: 97.3 °F (36.3 °C) (12/10/22 1134)  Pulse: 86 (12/10/22 1134)  Resp: 20 (12/10/22 1134)  BP: (!) 93/52 (12/10/22 1134)  SpO2: 96 % (12/10/22 1134)   Vital Signs (24h Range):  Temp:  [97.3 °F (36.3 °C)-98 °F (36.7 °C)] 97.3 °F (36.3 °C)  Pulse:  [71-86] 86  Resp:  [18-20] 20  SpO2:  [96 %-100 %] 96 %  BP: ()/(52-68) 93/52     Weight: 54.4 kg (120 lb)  Body mass index is 23.44 kg/m².    Intake/Output Summary (Last 24 hours) at 12/10/2022 1404  Last data filed at 12/10/2022 1353  Gross per 24 hour   Intake 800 ml   Output 420 ml   Net 380 ml      Physical Exam  Constitutional:       General: She is not in acute distress.     Appearance: Normal appearance.   HENT:      Head: Normocephalic and atraumatic.   Eyes:      General: No scleral icterus.     Conjunctiva/sclera: Conjunctivae normal.   Neck:      Vascular: No carotid bruit.   Cardiovascular:      Rate and Rhythm: Normal rate. Rhythm irregular.      Pulses: Normal pulses.      Heart sounds: Murmur (musical blowing murmur III/VI) heard.     No friction rub. No gallop.   Pulmonary:      Effort: Pulmonary effort is normal.      Breath sounds: Normal breath sounds.   Abdominal:      General: Bowel sounds are normal.      Palpations: Abdomen is soft. There is no mass.      Tenderness: There is no abdominal tenderness. There is no guarding or rebound.   Musculoskeletal:         General: No deformity.      Cervical back: No rigidity or tenderness.      Right lower leg: Edema present.      Left lower leg: Edema (moderate pitting generalized edema of bilatera LE) present.   Lymphadenopathy:      Cervical: No cervical adenopathy.   Skin:     Coloration: Skin is not jaundiced or pale.      Findings: No erythema.   Neurological:      General: No focal deficit present.      Mental Status: She is alert and oriented to person,  place, and time.      Gait: Gait normal.   Psychiatric:         Mood and Affect: Mood normal.         Behavior: Behavior normal.         Thought Content: Thought content normal.         Judgment: Judgment normal.       Significant Labs: All pertinent labs within the past 24 hours have been reviewed.    Significant Imaging: I have reviewed all pertinent imaging results/findings within the past 24 hours.

## 2022-12-10 NOTE — PROGRESS NOTES
[facility] - 3rd Floor [facility] Medicine  Progress Note    Patient Name: Dee Haji  MRN: [id number]  Patient Class: IP- Inpatient   Admission Date: 12/6/2022  Length of Stay: 4 days  Attending Physician: Tierra Johnson MD  Primary Care Provider: Tierra Johnson MD        Subjective:     Principal Problem:Acute on chronic combined systolic and diastolic heart failure        HPI:  No notes on file    Overview/Hospital Course:  No notes on file    Interval History: Patient reports she is feeling good.  No new complaints.    Review of Systems  Objective:     Vital Signs (Most Recent):  Temp: 97.3 °F (36.3 °C) (12/10/22 1134)  Pulse: 86 (12/10/22 1134)  Resp: 20 (12/10/22 1134)  BP: (!) 93/52 (12/10/22 1134)  SpO2: 96 % (12/10/22 1134)   Vital Signs (24h Range):  Temp:  [97.3 °F (36.3 °C)-98 °F (36.7 °C)] 97.3 °F (36.3 °C)  Pulse:  [71-86] 86  Resp:  [18-20] 20  SpO2:  [96 %-100 %] 96 %  BP: ()/(52-68) 93/52     Weight: 54.4 kg (120 lb)  Body mass index is 23.44 kg/m².    Intake/Output Summary (Last 24 hours) at 12/10/2022 1404  Last data filed at 12/10/2022 1353  Gross per 24 hour   Intake 800 ml   Output 420 ml   Net 380 ml      Physical Exam  Constitutional:       General: She is not in acute distress.     Appearance: Normal appearance.   HENT:      Head: Normocephalic and atraumatic.   Eyes:      General: No scleral icterus.     Conjunctiva/sclera: Conjunctivae normal.   Neck:      Vascular: No carotid bruit.   Cardiovascular:      Rate and Rhythm: Normal rate. Rhythm irregular.      Pulses: Normal pulses.      Heart sounds: Murmur (musical blowing murmur III/VI) heard.     No friction rub. No gallop.   Pulmonary:      Effort: Pulmonary effort is normal.      Breath sounds: Normal breath sounds.   Abdominal:      General: Bowel sounds are normal.      Palpations: Abdomen is soft. There is no mass.      Tenderness: There is no abdominal tenderness. There is no  guarding or rebound.   Musculoskeletal:         General: No deformity.      Cervical back: No rigidity or tenderness.      Right lower leg: Edema present.      Left lower leg: Edema (moderate pitting generalized edema of bilatera LE) present.   Lymphadenopathy:      Cervical: No cervical adenopathy.   Skin:     Coloration: Skin is not jaundiced or pale.      Findings: No erythema.   Neurological:      General: No focal deficit present.      Mental Status: She is alert and oriented to person, place, and time.      Gait: Gait normal.   Psychiatric:         Mood and Affect: Mood normal.         Behavior: Behavior normal.         Thought Content: Thought content normal.         Judgment: Judgment normal.       Significant Labs: All pertinent labs within the past 24 hours have been reviewed.    Significant Imaging: I have reviewed all pertinent imaging results/findings within the past 24 hours.      Assessment/Plan:      * Acute on chronic combined systolic and diastolic heart failure  Symptomatically improved though she still has a lot of edema.  Primary and renal slowly uptitrating the diuretics.  Echo results reviewed.      Anemia  There is a component of iron deficiency.  FOB was negative.  She is s/p Injectafer on Dec 7 and procrit x 1.      Acute kidney injury superimposed on CKD  Renal function improving w/ uptitration of the diuresis.  Renal following. Check urine protein creatinine ratio.      HTN (hypertension)    BP is low.      A-fib  She is on Xarelto   Rate is controlled.      VTE Risk Mitigation (From admission, onward)         Ordered     IP VTE HIGH RISK PATIENT  Once         12/06/22 1959     Place sequential compression device  Until discontinued         12/06/22 1959                Discharge Planning   JOO: 12/10/2022     Code Status: Full Code   Is the patient medically ready for discharge?:     Reason for patient still in hospital (select all that apply): Patient trending condition  Discharge Plan  A: Assisted Living                  Radha Xiong MD  Department of Hospital Medicine   [facility] - 3rd Floor Medical Telemetry

## 2022-12-10 NOTE — PROGRESS NOTES
[facility]      HPI:     Patient Name: Dee Haji  Admission Date: 12/6/2022  Hospital Length of Stay: 4 days  Code Status: Full Code   Attending Physician: Tierra Johnson MD   Primary Care Physician: Tierra Johnson MD  Principal Problem:Acute on chronic combined systolic and diastolic heart failure      Today pt seen and examined  Labs, events, imaging and meds reviewed for this hospital stay  Still with intermittent episodes of worsening dyspnea  On oxygen      Review of Systems:   Constitutional: +fatigue  Skin: Denies wounds, no rashes, no itching, no new skin lesions  HEENT: Denies acute change in hearing or vision, tinnitus, or dysphagia  Respiratory:  ++dyspnea  Cardiovascular: Denies chest pain, palpitations,   Gastrointestional: Denies abdominal pain, nausea, vomiting, diarrhea, or constipation  Genitourinary: Denies dysuria, hematuria, or incontinence; reports able to empty bladder  Musculoskeletal: Denies myalgias, back pain, decreased ROM or focal weakness  Neurological: Denies headaches, seizures, dizziness, paresthesias or weakness  Hematological: Denies unusual bruising or bleeding  Psychiatric: Denies hallucinations, depression, or confusion      Medications:   Scheduled Meds:   acetaZOLAMIDE  250 mg Oral BID    atorvastatin  20 mg Oral Daily    clopidogreL  75 mg Oral Daily    docusate sodium  100 mg Oral BID    hydrOXYzine HCL  25 mg Oral QHS    levothyroxine  50 mcg Oral Before breakfast    metOLazone  5 mg Oral Daily    polyethylene glycol  17 g Oral Daily    potassium chloride  20 mEq Oral BID    rivaroxaban  2.5 mg Oral BID    torsemide  40 mg Oral BID loop    trazodone  50 mg Oral QHS     Continuous Infusions:      Vitals:     Vitals:    12/09/22 2352 12/10/22 0417 12/10/22 0735 12/10/22 0920   BP: 102/64 107/60 98/62 98/62   Pulse: 77 78 71    Resp: 19  20    Temp: 98 °F (36.7 °C) 97.5 °F (36.4 °C) 97.7 °F (36.5 °C)    TempSrc: Oral Oral Oral     SpO2: 100% 99% 99%    Weight:       Height:             I/O last 3 completed shifts:  In: 320 [P.O.:320]  Out: 500 [Urine:500]    Intake/Output Summary (Last 24 hours) at 12/10/2022 0942  Last data filed at 12/9/2022 1408  Gross per 24 hour   Intake 320 ml   Output 300 ml   Net 20 ml       Physical Exam:   General: mild resp distress  Eyes: PERRLA, EOMI, conjunctiva clear, eyelids without swelling  HENT: atraumatic, oropharynx and nasal mucosa patent  Neck: full ROM, +JVD, no thyromegaly or lymphadenopathy  Respiratory: rhonchi and dec BS at bases  Cardiovascular:  without  rub; ++RANDALL G3/6 LSB, BL radial and pedal pulses felt  Edema: +2 pitting lower ext  Gastrointestinal: soft, non-tender, non-distended; positive bowel sounds; no masses to palpation  Genitourinary: no CVA tenderness upon palpation  Musculoskeletal: full ROM without limitation or discomfort  Integumentary: warm, dry; no rashes, wounds, or skin lesions  Neurological: oriented, appropriate, no acute deficits        Labs:     Cardiac Enzymes: Ejection Fractions:    No results for input(s): CPK, CPKMB, MB, TROPONINI in the last 72 hours. EF   Date Value Ref Range Status   12/07/2022 50 % Final        Chemistries:   Recent Labs   Lab 12/08/22  0403 12/09/22  0740 12/10/22  0356    147* 144   K 3.8 3.7 3.5   CO2 37* 38* 38*   BUN 77.9* 65.2* 59.4*   CREATININE 1.86* 1.61* 1.57*   CALCIUM 9.7 9.4 9.6   BILITOT 1.0 1.0 0.9   ALKPHOS 104 119 124   ALT 17 18 16   AST 39* 47* 49*   GLUCOSE 100 91 86   MG  --  1.90 1.96   PHOS  --   --  2.9        CBC/Anemia Labs: Coags:    Recent Labs   Lab 12/06/22  1419 12/08/22  0403 12/09/22  0740   WBC 6.7 7.9 6.8   HGB 8.9* 8.0* 8.8*   HCT 33.7* 30.8* 34.2*    219 207   MCV 69.9* 70.3* 71.0*   RDW 21.4* 20.7* 21.4*   IRON 24*  --   --    FERRITIN 21.35  --   --     Recent Labs   Lab 12/06/22  1419   INR 1.89*        POCT Glucose: HbA1c:    No results for input(s): POCTGLUCOSE in the last 168 hours.  Hemoglobin A1c   Date Value Ref Range Status   01/25/2022 5.0 <<=7.0 % Final          Impression:       Patient Active Problem List   Diagnosis    Arteriosclerosis of coronary artery    Peripheral vascular disease, unspecified    Stage 3a chronic kidney disease    A-fib    Gastroesophageal reflux disease    Mixed hyperlipidemia    Transient ischemic attack    HTN (hypertension)    S/P MVR (mitral valve replacement)    SOB (shortness of breath)    Valvular heart disease    Acute kidney injury superimposed on CKD    Advance care planning    Hypothyroidism    Esophageal dysphagia    Dysphagia, oropharyngeal phase    Acute on chronic combined systolic and diastolic heart failure     Severe cardiorenal disease  Volume overload  Alkalosis related to diuresis    Plan:   Increase torsemide to 40mg po bid  Diamox  Increase KCL dose  Cont metolazone 5mg daily    Pt not ready for DC yet         Antonio Almeida

## 2022-12-11 NOTE — PROGRESS NOTES
[facility] - 3rd Floor [facility] Medicine  Progress Note    Patient Name: Dee Haji  MRN: [id number]  Patient Class: IP- Inpatient   Admission Date: 12/6/2022  Length of Stay: 5 days  Attending Physician: Tierra Johnson MD  Primary Care Provider: Tierra Johnson MD        Subjective:     Principal Problem:Acute on chronic combined systolic and diastolic heart failure        HPI:  No notes on file    Overview/Hospital Course:  No notes on file    Interval History: No complaints.    Review of Systems  Objective:     Vital Signs (Most Recent):  Temp: 97.8 °F (36.6 °C) (12/11/22 0733)  Pulse: 88 (12/11/22 1115)  Resp: 20 (12/11/22 1115)  BP: (!) 100/56 (12/11/22 1222)  SpO2: 95 % (12/11/22 1115)   Vital Signs (24h Range):  Temp:  [97.4 °F (36.3 °C)-97.9 °F (36.6 °C)] 97.8 °F (36.6 °C)  Pulse:  [77-88] 88  Resp:  [16-20] 20  SpO2:  [94 %-100 %] 95 %  BP: ()/(52-72) 100/56     Weight: 54.4 kg (120 lb)  Body mass index is 23.44 kg/m².    Intake/Output Summary (Last 24 hours) at 12/11/2022 1520  Last data filed at 12/11/2022 1339  Gross per 24 hour   Intake 2208 ml   Output 1375 ml   Net 833 ml      Physical Exam  Constitutional:       Comments: Sleeping but arousable.  NAD   HENT:      Head: Normocephalic and atraumatic.   Cardiovascular:      Rate and Rhythm: Normal rate.      Heart sounds: Murmur (III/VI musical blowing murmur) heard.   Pulmonary:      Breath sounds: Rales present. No wheezing or rhonchi.   Abdominal:      General: Abdomen is flat.      Palpations: Abdomen is soft.   Skin:     General: Skin is warm and dry.   Neurological:      General: No focal deficit present.       Significant Labs: All pertinent labs within the past 24 hours have been reviewed.  BMP:   Recent Labs   Lab 12/11/22  0502      K 3.3*   CO2 39*   BUN 57.1*   CREATININE 1.58*   CALCIUM 10.1   MG 1.80     CBC:   Recent Labs   Lab 12/11/22  0502   WBC 7.5   HGB 8.8*   HCT 33.4*           Significant Imaging: I have reviewed all pertinent imaging results/findings within the past 24 hours.      Assessment/Plan:      * Acute on chronic combined systolic and diastolic heart failure  Symptomatically improved and LE edema looks better today as well.  Primary and renal slowly uptitrating the diuretics.  Echo results reviewed.      Anemia  There is a component of iron deficiency.  FOB was negative.  She is s/p Injectafer on Dec 7 and procrit x 1.      Acute kidney injury superimposed on CKD  Renal function improving w/ uptitration of the diuresis.  Renal following. Urine protein creatinin ratio was 1500.  A repeat was ordered to double check and it looks better.    HTN (hypertension)    BP is low.      A-fib  She is on Xarelto   Rate is controlled.      VTE Risk Mitigation (From admission, onward)         Ordered     IP VTE HIGH RISK PATIENT  Once         12/06/22 1959     Place sequential compression device  Until discontinued         12/06/22 1959                Discharge Planning   JOO:      Code Status: Full Code   Is the patient medically ready for discharge?:     Reason for patient still in hospital (select all that apply): Patient trending condition  Discharge Plan A: Assisted Living                  Radha Xiong MD  Department of Hospital Medicine   [facility] - 3rd Floor Medical Telemetry

## 2022-12-11 NOTE — PROGRESS NOTES
[facility] Inpatient Progress Note      HPI:     Patient Name: Dee Haji  Admission Date: 12/6/2022  Hospital Length of Stay: 5 days  Code Status: Full Code   Attending Physician: Tirera Johnson MD   Primary Care Physician: Tierra Johnson MD  Principal Problem:Acute on chronic combined systolic and diastolic heart failure      Today pt seen and examined  Labs, events, imaging and meds reviewed for this hospital stay  Feeling the same  Still with REDDING  Still requiring 02      Review of Systems:   Constitutional: gen weakness  Skin: Denies wounds, no rashes, no itching, no new skin lesions  HEENT: Denies acute change in hearing or vision, tinnitus, or dysphagia  Respiratory:  REDDING on oxygen  Cardiovascular: Denies chest pain, palpitations, or swelling  Gastrointestional: Denies abdominal pain, nausea, vomiting, diarrhea, or constipation  Genitourinary:suprapubic discomfort  Musculoskeletal: Denies myalgias, back pain, decreased ROM or focal weakness  Neurological: Denies headaches, seizures, dizziness, paresthesias or weakness  Hematological: Denies unusual bruising or bleeding  Psychiatric: Denies hallucinations, depression, or confusion      Medications:   Scheduled Meds:   acetaZOLAMIDE (DIAMOX) IVPB  250 mg Intravenous Q12H    atorvastatin  20 mg Oral Daily    bumetanide  2 mg Intravenous BID    clopidogreL  75 mg Oral Daily    docusate sodium  100 mg Oral BID    hydrOXYzine HCL  25 mg Oral QHS    levothyroxine  50 mcg Oral Before breakfast    potassium chloride in water  20 mEq Intravenous Once    potassium chloride  20 mEq Oral BID    rivaroxaban  2.5 mg Oral BID    spironolactone  25 mg Oral Daily    trazodone  50 mg Oral QHS     Continuous Infusions:      Vitals:     Vitals:    12/11/22 0554 12/11/22 0733 12/11/22 0804 12/11/22 1115   BP: 110/68 120/72 120/72 (!) 100/56   Pulse: 81 82  88   Resp: 16 20  20   Temp: 97.4 °F (36.3 °C) 97.8 °F (36.6 °C)     TempSrc: Oral Oral     SpO2: 98%  (!) 94%  95%   Weight:       Height:             I/O last 3 completed shifts:  In: 2088 [P.O.:2088]  Out: 1495 [Urine:1495]    Intake/Output Summary (Last 24 hours) at 12/11/2022 1146  Last data filed at 12/11/2022 0602  Gross per 24 hour   Intake 2088 ml   Output 1375 ml   Net 713 ml       Physical Exam:   General: no acute distress, awake, alert, chronically ill looking  Eyes: PERRLA, EOMI, conjunctiva clear, eyelids without swelling  HENT: atraumatic, oropharynx and nasal mucosa patent  Neck: full ROM, +JVD, no thyromegaly or lymphadenopathy  Respiratory: rhonchi,. Basilar rales, and dec BS L base  Cardiovascular: ++RANDALL G3./6 LSB  BL radial and pedal pulses felt  Edema: dec go +1 lower ext today  Gastrointestinal: soft, non-tender, non-distended; positive bowel sounds; no masses to palpation  Genitourinary: mild suprapubic tenderness  Musculoskeletal: full ROM without limitation or discomfort  Integumentary: warm, dry; no rashes, wounds, or skin lesions  Neurological: oriented, appropriate, no acute deficits        Labs:     Cardiac Enzymes: Ejection Fractions:    No results for input(s): CPK, CPKMB, MB, TROPONINI in the last 72 hours. EF   Date Value Ref Range Status   12/07/2022 50 % Final        Chemistries:   Recent Labs   Lab 12/09/22  0740 12/10/22  0356 12/11/22  0502   * 144 142   K 3.7 3.5 3.3*   CO2 38* 38* 39*   BUN 65.2* 59.4* 57.1*   CREATININE 1.61* 1.57* 1.58*   CALCIUM 9.4 9.6 10.1   BILITOT 1.0 0.9 0.8   ALKPHOS 119 124 184*   ALT 18 16 17   AST 47* 49* 48*   GLUCOSE 91 86 91   MG 1.90 1.96 1.80   PHOS  --  2.9  --         CBC/Anemia Labs: Coags:    Recent Labs   Lab 12/06/22  1419 12/08/22  0403 12/09/22  0740 12/11/22  0502   WBC 6.7 7.9 6.8 7.5   HGB 8.9* 8.0* 8.8* 8.8*   HCT 33.7* 30.8* 34.2* 33.4*    219 207 191   MCV 69.9* 70.3* 71.0* 70.3*   RDW 21.4* 20.7* 21.4* 22.8*   IRON 24*  --   --   --    FERRITIN 21.35  --   --   --     Recent Labs   Lab 12/06/22  1419   INR 1.89*         POCT Glucose: HbA1c:    No results for input(s): POCTGLUCOSE in the last 168 hours. Hemoglobin A1c   Date Value Ref Range Status   01/25/2022 5.0 <<=7.0 % Final          Impression:       Patient Active Problem List   Diagnosis    Arteriosclerosis of coronary artery    Peripheral vascular disease, unspecified    Stage 3a chronic kidney disease    A-fib    Gastroesophageal reflux disease    Mixed hyperlipidemia    Transient ischemic attack    HTN (hypertension)    S/P MVR (mitral valve replacement)    SOB (shortness of breath)    Valvular heart disease    Acute kidney injury superimposed on CKD    Advance care planning    Hypothyroidism    Esophageal dysphagia    Dysphagia, oropharyngeal phase    Acute on chronic combined systolic and diastolic heart failure    Anemia   Cardiorenal disease  Volume overload    ??increase in proteinuria      Plan:   Repeat Urine protein/creatinine  Doubt she has GN, and in case she does , risks of treatment outweighs the benefit      Resume IV bumex  DC metolazone and torsemide  Aldactone   Change diamox to IV         Antonio Almeida

## 2022-12-11 NOTE — SUBJECTIVE & OBJECTIVE
Interval History: No complaints.    Review of Systems  Objective:     Vital Signs (Most Recent):  Temp: 97.8 °F (36.6 °C) (12/11/22 0733)  Pulse: 88 (12/11/22 1115)  Resp: 20 (12/11/22 1115)  BP: (!) 100/56 (12/11/22 1222)  SpO2: 95 % (12/11/22 1115)   Vital Signs (24h Range):  Temp:  [97.4 °F (36.3 °C)-97.9 °F (36.6 °C)] 97.8 °F (36.6 °C)  Pulse:  [77-88] 88  Resp:  [16-20] 20  SpO2:  [94 %-100 %] 95 %  BP: ()/(52-72) 100/56     Weight: 54.4 kg (120 lb)  Body mass index is 23.44 kg/m².    Intake/Output Summary (Last 24 hours) at 12/11/2022 1520  Last data filed at 12/11/2022 1339  Gross per 24 hour   Intake 2208 ml   Output 1375 ml   Net 833 ml      Physical Exam  Constitutional:       Comments: Sleeping but arousable.  NAD   HENT:      Head: Normocephalic and atraumatic.   Cardiovascular:      Rate and Rhythm: Normal rate.      Heart sounds: Murmur (III/VI musical blowing murmur) heard.   Pulmonary:      Breath sounds: Rales present. No wheezing or rhonchi.   Abdominal:      General: Abdomen is flat.      Palpations: Abdomen is soft.   Skin:     General: Skin is warm and dry.   Neurological:      General: No focal deficit present.       Significant Labs: All pertinent labs within the past 24 hours have been reviewed.  BMP:   Recent Labs   Lab 12/11/22  0502      K 3.3*   CO2 39*   BUN 57.1*   CREATININE 1.58*   CALCIUM 10.1   MG 1.80     CBC:   Recent Labs   Lab 12/11/22  0502   WBC 7.5   HGB 8.8*   HCT 33.4*          Significant Imaging: I have reviewed all pertinent imaging results/findings within the past 24 hours.

## 2022-12-12 NOTE — PROGRESS NOTES
[facility] Hospital Medicine Progress Note        Chief Complaint: Inpatient Follow-up for volume overload    HPI:   Dee Haji is a 88 y.o. female who  has a past medical history of A-fib, CAD (coronary artery disease), CVA (cerebrovascular accident), Dyspnea on exertion, GERD (gastroesophageal reflux disease), HTN (hypertension), Mixed hyperlipidemia, Renal disorder, S/P AVR (aortic valve replacement), Synovial cyst of knee, TIA (transient ischemic attack), Unspecified hemorrhoids, and Unspecified osteoarthritis, unspecified site.. The patient presented to [facility] on 12/6/2022 with a primary complaint of worsening shortness of breath     She was actually directed to the hospital after she was sent by Dr. Almeida and noted to be and fluid overload.  Her oxygen saturation was noted to be at 90% on room air.  Patient appears to be going into cardiorenal disease and that has been multiple conversations regarding her frail status with discussions regarding possible transition to palliative care depending on how she does.    After she was sent to emergency room, patient was initiated on IV Lasix at 80 mg b.i.d  It well with diuresis and was eventually switched back to torsemide.    Interval Hx:   The patient was seen and examined.  Feeling better.  Ambulating to the bathroom with help of therapy.  SLP has been consulted since patient has been having coughing spells with attempts to eat.    Patient did have urinary retention needing a John catheter with drainage of 1000 cc of clear urine upon placement.  Overall doing well, edema hands shortness of breath are improved.   was at bedside    Objective/physical exam:  General: In no acute distress, afebrile, frail and appears tired  Chest: Clear to auscultation bilaterally  Heart: RRR, +S1, S2, 3/6 systolic murmur    Abdomen: Soft, nontender, BS +  MSK: Warm, no lower extremity edema, no clubbing or cyanosis  Neurologic:  Generalized  weakness, nonfocal     VITAL SIGNS: 24 HRS MIN & MAX LAST   Temp  Min: 97.3 °F (36.3 °C)  Max: 98.2 °F (36.8 °C) 97.3 °F (36.3 °C)   BP  Min: 97/62  Max: 116/70 107/63   Pulse  Min: 72  Max: 83  80   Resp  Min: 16  Max: 20 20   SpO2  Min: 95 %  Max: 100 % 100 %       Recent Labs   Lab 12/09/22  0740 12/11/22  0502 12/12/22  0420   WBC 6.8 7.5 7.2   RBC 4.82 4.75 4.86   HGB 8.8* 8.8* 9.2*   HCT 34.2* 33.4* 34.6*   MCV 71.0* 70.3* 71.2*   MCH 18.3* 18.5* 18.9*   MCHC 25.7* 26.3* 26.6*   RDW 21.4* 22.8* 24.0*    191 189       Recent Labs   Lab 12/09/22  0740 12/10/22  0356 12/11/22  0502 12/12/22  0420   * 144 142 144   K 3.7 3.5 3.3* 3.4*   CO2 38* 38* 39* 40*   BUN 65.2* 59.4* 57.1* 53.6*   CREATININE 1.61* 1.57* 1.58* 1.47*   CALCIUM 9.4 9.6 10.1 10.6*   MG 1.90 1.96 1.80  --    ALBUMIN 2.6* 2.6* 2.5* 2.6*   ALKPHOS 119 124 184* 205*   ALT 18 16 17 18   AST 47* 49* 48* 49*   BILITOT 1.0 0.9 0.8 0.9          Microbiology Results (last 7 days)       ** No results found for the last 168 hours. **             See below for Radiology    Scheduled Med:   acetaZOLAMIDE  250 mg Oral TID    atorvastatin  20 mg Oral Daily    clopidogreL  75 mg Oral Daily    docusate sodium  100 mg Oral BID    hydrOXYzine HCL  25 mg Oral QHS    levothyroxine  50 mcg Oral Before breakfast    potassium chloride  20 mEq Oral BID    rivaroxaban  2.5 mg Oral BID    spironolactone  25 mg Oral Daily    trazodone  50 mg Oral QHS        Continuous Infusions:       PRN Meds:  acetaminophen, melatonin, morphine, morphine, ondansetron       Assessment/Plan:  * Acute on chronic combined systolic and diastolic heart failure  Symptomatically improved and LE edema looks better today as well.    Echo with ejection fraction at 40-45%      Anemia  There is a component of iron deficiency.  FOB was negative.  She is s/p Injectafer on Dec 7 and procrit x 1.        Acute kidney injury superimposed on CKD  Renal function improving w/ uptitration of the  diuresis.    Nephrology on board  Needed John catheter placement for retention purposes, was able to diurese more than 1000 cc upon placement of John      HTN (hypertension)     BP is low.        A-fib  Anticoagulated with renal dose Xarelto   Rate is controlled.    VTE prophylaxis:  Xarelto    Patient condition:  Fair    Anticipated discharge and Disposition:   Home when stable with home health      All diagnosis and differential diagnosis have been reviewed; assessment and plan has been documented; I have personally reviewed the labs and test results that are presently available; I have reviewed the patients medication list; I have reviewed the consulting providers response and recommendations. I have reviewed or attempted to review medical records based upon their availability    All of the patient's questions have been  addressed and answered. Patient's is agreeable to the above stated plan. I will continue to monitor closely and make adjustments to medical management as needed.  _____________________________________________________________________    Nutrition Status:    Radiology:  Fl Modified Barium Swallow Speech  See procedure notes from Speech Pathologist.    This procedure was auto-finalized.      Tierra Johnson MD   12/12/2022

## 2022-12-12 NOTE — PT/OT/SLP EVAL
Speech Language Pathology Department  Clinical Swallow Evaluation    Patient Name:  Dee Haji   MRN:  [id number]  Admitting Diagnosis: Acute on chronic combined systolic and diastolic heart failure    Recommendations:     General recommendations:  Modified Barium Swallow Study  Precautions: Standard, aspiration    History:     Past Medical History:   Diagnosis Date    A-fib     CAD (coronary artery disease)     CVA (cerebrovascular accident)     Dyspnea on exertion     GERD (gastroesophageal reflux disease)     HTN (hypertension)     Mixed hyperlipidemia     Renal disorder     S/P AVR (aortic valve replacement)     Synovial cyst of knee     TIA (transient ischemic attack)     Unspecified hemorrhoids     Unspecified osteoarthritis, unspecified site        Past Surgical History:   Procedure Laterality Date    ANGIOGRAM, CORONARY, WITH LEFT HEART CATHETERIZATION      BUNIONECTOMY Right     CATARACT EXTRACTION Right     CHOLECYSTECTOMY      CORONARY STENT PLACEMENT      ESOPHAGOGASTRODUODENOSCOPY N/A 11/17/2022    Procedure: EGD W/DIL;  Surgeon: CIARA Nunez MD;  Location: [facility] ENDOSCOPY;  Service: Gastroenterology;  Laterality: N/A;  48FR  Garcia    left lower lobectomy Left      Chest X-Rays: 12/10 Slight overall improvement in bilateral lower lung opacities with little change in small pleural effusions.  Electronically signed by: Ravinder Price    Home Diet: Regular and thin liquids  Current Method of Nutrition: PO diet (renal, non-dialysis)    Subjective     Patient awake, alert, and cooperative.  [place].    Patient goals: regular diet     Spiritual/Cultural/Scientology Beliefs/Practices that affect care: no    Pain/Comfort: Pain Rating 1: 0/10    Respiratory Status: nasal cannula    Objective:     Oral Musculature Evaluation  Oral Musculature: general weakness  Dentition: upper dentures, partials  Secretion Management: adequate  Mucosal Quality: good  Lingual Strength and Mobility: impaired  strength  Voice Prior to PO Intake: clear    Consistency Fed By Oral Symptoms Pharyngeal Symptoms   Thin liquid by cup Self Anterior spillage Multiple swallows  Throat clear after swallow   Puree SLP None Multiple swallows  Throat clear after swallow   Thin liquid by straw Self None Multiple swallows  Throat clear after swallow     Assessment:     Pt presents with signs/sx oropharyngeal dysphagia warranting comprehensive assessment of swallow function to determine safety of PO intake.    Patient Education:     Patient and spouse provided with verbal education regarding SLP POC and risks of aspiration.  Understanding was verbalized.    Plan:     Plan of Care reviewed with:  patient, spouse     Time Tracking:     SLP Treatment Date:   12/12/22  Speech Start Time:  0955  Speech Stop Time:  1010     Speech Total Time (min):  15 min    Billable minutes:  Swallow and Oral Function Evaluation, 15 minutes      12/12/2022

## 2022-12-12 NOTE — PROCEDURES
Speech Language Pathology Department  Modified Barium Swallow Study    Patient Name:  Dee Haji   MRN:  [id number]  Diagnosis: Acute on chronic combined systolic and diastolic heart failure     Recommendations:     General recommendations:  dysphagia therapy  Repeat MBS study: 10-14 days  Diet recommendations:  Easy to Chew Diet - IDDSI Level 7, Liquid Diet Level: Mildly thick liquids - IDDSI Level 2   Swallow strategies/precautions: small bites/sips, slow rate, additional swallow per bite/sip, and medications whole in puree  General precautions: Standard, aspiration    History:     A Modified Barium Swallow Study was completed to assess the efficiency of his/her swallow function, rule out aspiration and make recommendations regarding safe dietary consistencies, effective compensatory strategies, and safe eating environment.     Past Medical History:   Diagnosis Date    A-fib     CAD (coronary artery disease)     CVA (cerebrovascular accident)     Dyspnea on exertion     GERD (gastroesophageal reflux disease)     HTN (hypertension)     Mixed hyperlipidemia     Renal disorder     S/P AVR (aortic valve replacement)     Synovial cyst of knee     TIA (transient ischemic attack)     Unspecified hemorrhoids     Unspecified osteoarthritis, unspecified site      Past Surgical History:   Procedure Laterality Date    ANGIOGRAM, CORONARY, WITH LEFT HEART CATHETERIZATION      BUNIONECTOMY Right     CATARACT EXTRACTION Right     CHOLECYSTECTOMY      CORONARY STENT PLACEMENT      ESOPHAGOGASTRODUODENOSCOPY N/A 11/17/2022    Procedure: EGD W/DIL;  Surgeon: CIARA Nunez MD;  Location: [facility] ENDOSCOPY;  Service: Gastroenterology;  Laterality: N/A;  48FR  Garcia    left lower lobectomy Left        Home Diet: Regular and thin liquids  Current Method of Nutrition: PO diet (renal, non-dialysis)    Subjective:     Patient awake, alert, and cooperative.    Spiritual/Cultural/Buddhism Beliefs/Practices that affect  care: no    Pain/Comfort: Pain Rating 1: 0/10    Respiratory Status: nasal cannula    Fluoroscopic Results:     Oral Musculature Evaluation:  Oral Musculature: general weakness  Dentition: upper dentures, partials  Secretion Management: adequate  Mucosal Quality: good  Lingual Strength and Mobility: impaired strength  Voice Prior to PO Intake: clear    Visualization  Patient was seen in the lateral view    Oral Phase:   Adequate lip closure  Prolonged/disorganized bolus formation  Prolonged mastication  Loss of bolus control with liquids  Mild oral residue    Pharyngeal Phase:   Swallow delay with spill to the pyriform sinuses  Reduced base of tongue retraction  Adequate epiglottic deflection  Reduced hyolaryngeal excursion  Poor airway protection  Mild base of tongue residue with all consistencies  Consistency Laryngeal Penetration Aspiration   Mildly thick liquid by cup None None   Thin liquid by cup During the swallow During the swallow  SILENT  Cued cough NOT productive   Puree None None   Mildly thick liquid by straw None None   Chewable solid None None     Cervical Esophageal Phase:   UES appeared to accommodate all bolus types without stasis or retrograde movement visualized    Compensatory Strategies:  Additional swallows cleared oral and base of tongue residue    Assessment:     Pt exhibited mild-moderate oropharyngeal dysphagia with SILENT aspiration of thin liquids    Goals:   Multidisciplinary Problems       SLP Goals          Problem: SLP    Goal Priority Disciplines Outcome   SLP Goal     SLP Ongoing, Progressing   Description:   LTG: Tolerate least restrictive PO diet with no clinical signs/sx aspiration  STGs:  1.  Complete base of tongue and laryngeal strengthening exercises with min cues  2.  Tolerate thermal stimulation to the anterior faucial pillars with 100% effortful swallow responses and delay less than 2 seconds.                       Patient Education:     Patient provided with verbal  education regarding MBS results/recommendations and SLP POC.  Understanding was verbalized, however additional teaching warranted.    Plan:     Patient to be seen:  5 x/week   Plan of Care expires:  12/26/22  Plan of Care reviewed with:  patient   SLP Follow-Up:  Yes      Time Tracking:     SLP Treatment Date:   12/12/22  Speech Start Time:  1055  Speech Stop Time:  1120     Speech Total Time (min):  25 min    Billable minutes:   Motion Fluoroscopic Evaluation, Video Recording, 25 minutes      12/12/2022

## 2022-12-12 NOTE — PLAN OF CARE
Problem: Physical Therapy  Goal: Physical Therapy Goal  Description: Goals to be met by: 23     Patient will increase functional independence with mobility by performin. Supine to sit with Stand-by Assistance  2. Sit to supine with Stand-by Assistance  3. Sit to stand transfer with Stand-by Assistance  4. Gait  x 100 feet with Stand-by Assistance using Rolling Walker.     Outcome: Ongoing, Progressing

## 2022-12-12 NOTE — PT/OT/SLP EVAL
"Physical Therapy Evaluation    Patient Name:  Dee Haji   MRN:  [id number]    Recommendations:     Discharge Recommendations:  (return to assisted living facility with HH PT)   Discharge Equipment Recommendations: none   Barriers to discharge:  medical dx; decreased functional independence    Assessment:     Dee Haji is a 88 y.o. female admitted with a medical diagnosis of Acute on chronic combined systolic and diastolic heart failure.  She presents with the following impairments/functional limitations: weakness, gait instability, impaired endurance, impaired balance, decreased lower extremity function, impaired functional mobility, impaired self care skills.    Rehab Prognosis: Good; patient would benefit from acute skilled PT services to address these deficits and reach maximum level of function.    Recent Surgery: * No surgery found *      Plan:     During this hospitalization, patient to be seen 6 x/week to address the identified rehab impairments via gait training, therapeutic activities, therapeutic exercises and progress toward the following goals:    Plan of Care Expires:  01/12/23    Subjective     Chief Complaint: n/a  Patient/Family Comments/goals: to get better and go home  Pain/Comfort:  Pain Rating 1: 0/10    Patients cultural, spiritual, Alevism conflicts given the current situation: no    Living Environment:  Pt lives with her  in an assisted living facility. The facility has a ramp entrance   Prior to admission, patients level of function was independent-- however, it appeared as if she has been limited with her mobility "for a couple weeks"... stated she would sit on the chair and scoot herself places/ambulation was overall limited & to stand for transfer "was difficult".    Equipment used/owned at home:  (wheelchair, transport chair, RW).    Upon discharge, patient will have assistance from unsure.    Objective:     Communicated with NSG prior to session.    Patient found " up in chair with pulse ox (continuous), telemetry, oxygen, peripheral IV, gunter catheter upon PT entry to room.    General Precautions: Standard, fall  Orthopedic Precautions:N/A   Braces: N/A  Respiratory Status: Nasal cannula, flow 3 L/min    Exams:  Cognitive Exam:  Patient is oriented to Person, Place, Time, and Situation  RLE Strength: WFL  LLE Strength: WFL    Functional Mobility:  Transfers:     Sit to Stand:  minimum assistance with rolling walker  Toilet: minimum assistance with  rolling walker; stand stand transfer performed with bedside chair brought in to restroom close to toilet   +BM; required totA for jeramie-care  Pre-gait/Gait: standing within RW pt performed marches x10 ea LE + 4 feet fwd/bkwd(x2) with use of RW and David; seated rest break in between; limited by decreased tolerance to ax  Following these activities pt reported urge for BM therefore toilet transfer was performed       Patient left up in chair with all lines intact and call button in reach.    GOALS:   Multidisciplinary Problems       Physical Therapy Goals          Problem: Physical Therapy    Goal Priority Disciplines Outcome Goal Variances Interventions   Physical Therapy Goal     PT, PT/OT Ongoing, Progressing     Description: Goals to be met by: 23     Patient will increase functional independence with mobility by performin. Supine to sit with Stand-by Assistance  2. Sit to supine with Stand-by Assistance  3. Sit to stand transfer with Stand-by Assistance  4. Gait  x 100 feet with Stand-by Assistance using Rolling Walker.                          History:     Past Medical History:   Diagnosis Date    A-fib     CAD (coronary artery disease)     CVA (cerebrovascular accident)     Dyspnea on exertion     GERD (gastroesophageal reflux disease)     HTN (hypertension)     Mixed hyperlipidemia     Renal disorder     S/P AVR (aortic valve replacement)     Synovial cyst of knee     TIA (transient ischemic attack)     Unspecified  hemorrhoids     Unspecified osteoarthritis, unspecified site        Past Surgical History:   Procedure Laterality Date    ANGIOGRAM, CORONARY, WITH LEFT HEART CATHETERIZATION      BUNIONECTOMY Right     CATARACT EXTRACTION Right     CHOLECYSTECTOMY      CORONARY STENT PLACEMENT      ESOPHAGOGASTRODUODENOSCOPY N/A 11/17/2022    Procedure: EGD W/DIL;  Surgeon: CIARA Nunez MD;  Location: [facility] ENDOSCOPY;  Service: Gastroenterology;  Laterality: N/A;  48FR  Garcia    left lower lobectomy Left        Time Tracking:     PT Received On: 12/12/22  PT Start Time: 1009     PT Stop Time: 1037  PT Total Time (min): 28 min     Billable Minutes: Evaluation mod and Therapeutic Activity 1      12/12/2022

## 2022-12-12 NOTE — PROGRESS NOTES
[facility] Inpatient Progress Note      HPI:     Patient Name: Dee Haji  Admission Date: 12/6/2022  Hospital Length of Stay: 6 days  Code Status: Full Code   Attending Physician: Tierra Johnson MD   Primary Care Physician: Tierra Johnson MD  Principal Problem:Acute on chronic combined systolic and diastolic heart failure      Today pt seen and examined  Labs, events, imaging and meds reviewed for this hospital stay  Feels better  Needed a gunter due to urinary retention ( Had > 600cc residual)  Better urine output  Less dyspnea  Less edema      Review of Systems:   Constitutional: Denies fever, fatigue, generalized weakness, night sweats,   Skin: Denies wounds, no rashes, no itching, no new skin lesions  HEENT: Denies acute change in hearing or vision, tinnitus, or dysphagia  Respiratory:  Denies cough, much improved dyspnea  Cardiovascular: Denies chest pain, palpitations, less edema  Gastrointestional: Denies abdominal pain, nausea, vomiting, diarrhea, or constipation  Genitourinary: gunter  Musculoskeletal: Denies myalgias, back pain, decreased ROM or new focal weakness  Neurological: Denies headaches, seizures, dizziness, paresthesias or weakness  Hematological: Denies unusual bruising or bleeding  Psychiatric: Denies hallucinations, depression, or confusion      Medications:   Scheduled Meds:   acetaZOLAMIDE  250 mg Oral TID    atorvastatin  20 mg Oral Daily    clopidogreL  75 mg Oral Daily    docusate sodium  100 mg Oral BID    hydrOXYzine HCL  25 mg Oral QHS    levothyroxine  50 mcg Oral Before breakfast    potassium chloride in water  20 mEq Intravenous Once    potassium chloride  20 mEq Oral BID    rivaroxaban  2.5 mg Oral BID    spironolactone  25 mg Oral Daily    trazodone  50 mg Oral QHS     Continuous Infusions:      Vitals:     Vitals:    12/11/22 2341 12/12/22 0331 12/12/22 0500 12/12/22 0731   BP: 111/64 116/70  103/64   Pulse: 83 76  77   Resp: 18 16  18   Temp: 97.3 °F  (36.3 °C) 97.9 °F (36.6 °C)  97.7 °F (36.5 °C)   TempSrc: Oral Oral  Oral   SpO2: 96% 100%  100%   Weight:   53.8 kg (118 lb 9.7 oz)    Height:             I/O last 3 completed shifts:  In: 1423 [P.O.:1423]  Out: 3175 [Urine:3175]    Intake/Output Summary (Last 24 hours) at 12/12/2022 0827  Last data filed at 12/12/2022 0549  Gross per 24 hour   Intake 840 ml   Output 1800 ml   Net -960 ml       Physical Exam:   General: no acute distress, awake, alert  Eyes: PERRLA, EOMI, conjunctiva clear, eyelids without swelling  HENT: atraumatic, oropharynx and nasal mucosa patent  Neck: full ROM, no JVD, no thyromegaly or lymphadenopathy  Respiratory: equal, better air entry, less rhonchi  Cardiovascular:++RANDALL g3/6 LSB; BL radial and pedal pulses felt  Edema: significant decrease  Gastrointestinal: soft, non-tender, non-distended; positive bowel sounds; no masses to palpation  Genitourinary: gunter  Musculoskeletal: ROM without new limitation or discomfort  Integumentary: warm, dry; no rashes, wounds, or new skin lesions  Neurological: oriented, appropriate, no acute focal deficits        Labs:     Cardiac Enzymes: Ejection Fractions:    No results for input(s): CPK, CPKMB, MB, TROPONINI in the last 72 hours. EF   Date Value Ref Range Status   12/07/2022 50 % Final        Chemistries:   Recent Labs   Lab 12/09/22  0740 12/10/22  0356 12/11/22  0502 12/12/22  0420   * 144 142 144   K 3.7 3.5 3.3* 3.4*   CO2 38* 38* 39* 40*   BUN 65.2* 59.4* 57.1* 53.6*   CREATININE 1.61* 1.57* 1.58* 1.47*   CALCIUM 9.4 9.6 10.1 10.6*   BILITOT 1.0 0.9 0.8 0.9   ALKPHOS 119 124 184* 205*   ALT 18 16 17 18   AST 47* 49* 48* 49*   GLUCOSE 91 86 91 92   MG 1.90 1.96 1.80  --    PHOS  --  2.9  --   --         CBC/Anemia Labs: Coags:    Recent Labs   Lab 12/06/22  1419 12/08/22  0403 12/09/22  0740 12/11/22  0502 12/12/22  0420   WBC 6.7   < > 6.8 7.5 7.2   HGB 8.9*   < > 8.8* 8.8* 9.2*   HCT 33.7*   < > 34.2* 33.4* 34.6*      < > 207 191  189   MCV 69.9*   < > 71.0* 70.3* 71.2*   RDW 21.4*   < > 21.4* 22.8* 24.0*   IRON 24*  --   --   --   --    FERRITIN 21.35  --   --   --   --     < > = values in this interval not displayed.    Recent Labs   Lab 12/06/22  1419   INR 1.89*        POCT Glucose: HbA1c:    No results for input(s): POCTGLUCOSE in the last 168 hours. Hemoglobin A1c   Date Value Ref Range Status   01/25/2022 5.0 <<=7.0 % Final          Impression:       Patient Active Problem List   Diagnosis    Arteriosclerosis of coronary artery    Peripheral vascular disease, unspecified    Stage 3a chronic kidney disease    A-fib    Gastroesophageal reflux disease    Mixed hyperlipidemia    Transient ischemic attack    HTN (hypertension)    S/P MVR (mitral valve replacement)    SOB (shortness of breath)    Valvular heart disease    Acute kidney injury superimposed on CKD    Advance care planning    Hypothyroidism    Esophageal dysphagia    Dysphagia, oropharyngeal phase    Acute on chronic combined systolic and diastolic heart failure    Anemia     Cardiorenal disease  Repeat urine : NO significant proteinuria  Volume overload much better  Met alkalosis( related to loop diuretics)    Plan:     DC BUMEX IV  Extra dose of KCL IV  Acetazolamide change to PO  Cont aldactone  Labs in am       Antonio Almeida

## 2022-12-12 NOTE — PHYSICIAN QUERY
PT Name: Dee Haji  MR #: [id number]     DOCUMENTATION CLARIFICATION     CDS/: Sole Coulter RN             Contact information: [email or web address]  This form is a permanent document in the medical record.     Query Date: December 12, 2022    By submitting this query, we are merely seeking further clarification of documentation.  Please utilize your independent clinical judgment when addressing the question(s) below.  The Medical Record contains the following   Indicators   Supporting Clinical Findings   Location in Medical Record   x SOB, REDDING, Wheezing, Productive Cough, Use of Accessory Muscles, etc. Positive for shortness of breath  Crackles bilaterally 12/6 ED MD PN       x RR         ABGs         O2 sat 90% on RA, 2L/min placed in triage    The patient had an oxygen saturation of 90% on room air    O2 sat 91% on 2L NC     12/6 ED MD PN        12/6 Flowsheet     x Hypoxia/Hypercapnia Hypoxia 12/6 ED MD PN      BiPAP/Intubation/Mechanical Ventilation     x Supplemental O2 O2 2-3L NC 12/6-8 flowsheet      Home O2, Oxygen Dependence      Respiratory distress or failure     x Radiology findings Bilateral pleural effusions.  Increased left retrocardiac density and silhouetting of the left hemidiaphragm    Slight overall improvement in bilateral lower lung opacities with little change in small pleural effusions 12/6 CXR      12/11 CXR     x Acute/Chronic Illness Acute on chronic decompensated systolic heart failure  RENÉ  Bilateral pleural effusions  CKD 3b  PAF  GREER   12/7 HP       x Treatment Oxygen  Pulse oximetry  Cardiac monitor  ECHO  CXR   12/6 NSG orders    Other         The noted clinical guidelines are only system guidelines and do not replace the providers clinical judgment.    Provider, please specify the diagnosis or diagnoses associated with above clinical findings.     [  X  ] Acute Respiratory Failure with Hypoxia -   ABG pO2 < 60 mmHg or O2 sat of <91% on room air and  respiratory symptoms documented     [    ] Hypoxia Only     [    ] Other Respiratory Diagnosis (please specify): _________________     [   ] Clinically Undetermined         Please document in your progress notes daily for the duration of treatment until resolved and include in your discharge summary.     Reference:    EMILY Roldan MD. (2020, March 13). Acute respiratory distress syndrome: Clinical features, diagnosis, and complications in adults ([id number] MICHAEL Holden MD & [id number] LAURO Hill MD, Eds.). Retrieved November 13, 2020, from [email or web address]/contents/acute-respiratory-distress-syndrome-clinical-features-diagnosis-and-complications-in-adults?search=ards&source=search_result&selectedTitle=1~150&usage_type=default&display_rank=1  Form No. 02330

## 2022-12-13 NOTE — PLAN OF CARE
AVS sent to [facility]. Order for BSC sent to [facility] to call pt's  for payment and delivery with home o2.   Home

## 2022-12-13 NOTE — PROGRESS NOTES
Discharge instructions given to patient and . New medications and side effects were discussed. Follow-up appointments were reviewed. IV and telemetry were discontinued. Pt and  educated on mildly thick liquids. Redness noted to patient's bottom. Barrier cream and foam dressing applied, note left for HH nurse to monitor. Pt educated on importance of turning in bed and ambulating daily. Pt urinated after gunter removal. Bladder scan showed 199ml. Dr. Almeida notified and okay with discharge. Pt will return to assisted living with [facility]. Bedside commode being ordered and delivered to patient's home. Pt will be wheeled down via wheelchair and picked up via private vehicle.

## 2022-12-13 NOTE — PLAN OF CARE
Home O2 orders sent to [facility] via [employer]. DC orders sent to [facility] via [employer]. Pt's gunter was dc this am.  states he will be able to drive pt home when dc ready.

## 2022-12-13 NOTE — PT/OT/SLP PROGRESS
Physical Therapy Treatment    Patient Name:  Dee Haji   MRN:  [id number]    Recommendations:     Discharge Recommendations:   (return to assisted living facility with HH PT)   Discharge Equipment Recommendations: none     Subjective     Patient awake and alert.     Objective:     General Precautions: Standard, fall   Orthopedic Precautions:N/A   Braces:    Respiratory Status: Nasal cannula, flow 1 L/min     Communicated with nurse prior to treatment.     Functional Mobility:    Rolling:Stand-by Assistance  Supine to sit:Minimal Assistance  Sit to stand transfer: Minimal Assistance  Bed to chair transfer:SBA  Gait to bathroom ~10 ft x 2 CGA and patient able to have BM and also voided.       AM-PAC 6 CLICK MOBILITY        Patient left up in chair with   present..    GOALS:   Multidisciplinary Problems       Physical Therapy Goals          Problem: Physical Therapy    Goal Priority Disciplines Outcome Goal Variances Interventions   Physical Therapy Goal     PT, PT/OT Ongoing, Progressing     Description: Goals to be met by: 23     Patient will increase functional independence with mobility by performin. Supine to sit with Stand-by Assistance  2. Sit to supine with Stand-by Assistance  3. Sit to stand transfer with Stand-by Assistance  4. Gait  x 100 feet with Stand-by Assistance using Rolling Walker.                          Assessment:     Dee Haji is a 88 y.o. female admitted with a medical diagnosis of Acute on chronic combined systolic and diastolic heart failure.     Patient Active Problem List   Diagnosis    Arteriosclerosis of coronary artery    Peripheral vascular disease, unspecified    Stage 3a chronic kidney disease    A-fib    Gastroesophageal reflux disease    Mixed hyperlipidemia    Transient ischemic attack    HTN (hypertension)    S/P MVR (mitral valve replacement)    SOB (shortness of breath)    Valvular heart disease    Acute kidney injury superimposed on CKD     Advance care planning    Hypothyroidism    Esophageal dysphagia    Dysphagia, oropharyngeal phase    Acute on chronic combined systolic and diastolic heart failure    Anemia        Rehab Prognosis: Good; patient would benefit from acute skilled PT services to address these deficits and reach maximum level of function.    Recent Surgery: * No surgery found *      Plan:     During this hospitalization, patient to be seen 6 x/week to address the identified rehab impairments via gait training, therapeutic activities, therapeutic exercises and progress toward the following goals:    Plan of Care Expires:  01/12/23    Billable Minutes     Billable Minutes: Gait Training 15 and Therapeutic Activity 15    Treatment Type: Treatment  PT/PTA: PTA     PTA Visit Number: 1     12/13/2022

## 2022-12-13 NOTE — PROGRESS NOTES
[facility] Inpatient Progress Note      HPI:     Patient Name: Dee Haji  Admission Date: 12/6/2022  Hospital Length of Stay: 7 days  Code Status: Full Code   Attending Physician: Tierra Johnson MD   Primary Care Physician: Tierra Johnson MD  Principal Problem:Acute on chronic combined systolic and diastolic heart failure      Today pt seen and examined  Labs, events, imaging and meds reviewed for this hospital stay  Feels better  Breathing better  Good urine output      Review of Systems:   Constitutional: Denies fever, fatigue, generalized weakness, night sweats, or acute weight change  Skin: Denies wounds, no rashes, no itching, no new skin lesions  HEENT: Denies acute change in hearing or vision, tinnitus, or dysphagia  Respiratory:  Denies cough, dyspnea much better  Cardiovascular: Denies chest pain, palpitations, or swelling  Gastrointestional: Denies abdominal pain, nausea, vomiting, diarrhea, or constipation  Genitourinary: gunter  Musculoskeletal: Denies myalgias, back pain, decreased ROM or focal weakness  Neurological: Denies headaches, seizures, dizziness, paresthesias or weakness  Hematological: Denies unusual bruising or bleeding  Psychiatric: Denies hallucinations, depression, or confusion      Medications:   Scheduled Meds:   acetaZOLAMIDE  250 mg Oral TID    atorvastatin  20 mg Oral Daily    clopidogreL  75 mg Oral Daily    docusate sodium  100 mg Oral BID    hydrOXYzine HCL  25 mg Oral QHS    levothyroxine  50 mcg Oral Before breakfast    potassium chloride  20 mEq Oral BID    rivaroxaban  2.5 mg Oral BID    spironolactone  25 mg Oral Daily    trazodone  50 mg Oral QHS     Continuous Infusions:      Vitals:     Vitals:    12/12/22 1929 12/12/22 2348 12/13/22 0529 12/13/22 0734   BP: 111/67 104/63 107/64 106/64   Pulse: 79 75 78 79   Resp: 20 19 18 20   Temp: 97.9 °F (36.6 °C) 97.9 °F (36.6 °C) 97.5 °F (36.4 °C) 97.9 °F (36.6 °C)   TempSrc: Oral Oral Oral Oral   SpO2: 100%  100% 100% 100%   Weight:       Height:             I/O last 3 completed shifts:  In: 520 [P.O.:520]  Out: 3500 [Urine:3500]    Intake/Output Summary (Last 24 hours) at 12/13/2022 0826  Last data filed at 12/13/2022 0036  Gross per 24 hour   Intake 280 ml   Output 1700 ml   Net -1420 ml       Physical Exam:   General: no acute distress, awake, alert  Eyes: PERRLA, EOMI, conjunctiva clear, eyelids without swelling  HENT: atraumatic, oropharynx and nasal mucosa patent  Neck: full ROM, no JVD, no thyromegaly or lymphadenopathy  Respiratory: equal, unlabored, basilar rhonchi  Cardiovascular: RRR , ++RANDALL G3/6 LSB BL radial and pedal pulses felt  Edema: none  Gastrointestinal: soft, non-tender, non-distended; positive bowel sounds; no masses to palpation  Genitourinary: gunter  Musculoskeletal: full ROM without limitation or discomfort  Integumentary: warm, dry; no rashes, wounds, or skin lesions  Neurological: oriented, appropriate, no acute deficits        Labs:     Cardiac Enzymes: Ejection Fractions:    No results for input(s): CPK, CPKMB, MB, TROPONINI in the last 72 hours. EF   Date Value Ref Range Status   12/07/2022 50 % Final        Chemistries:   Recent Labs   Lab 12/09/22  0740 12/10/22  0356 12/11/22  0502 12/12/22  0420   * 144 142 144   K 3.7 3.5 3.3* 3.4*   CO2 38* 38* 39* 40*   BUN 65.2* 59.4* 57.1* 53.6*   CREATININE 1.61* 1.57* 1.58* 1.47*   CALCIUM 9.4 9.6 10.1 10.6*   BILITOT 1.0 0.9 0.8 0.9   ALKPHOS 119 124 184* 205*   ALT 18 16 17 18   AST 47* 49* 48* 49*   GLUCOSE 91 86 91 92   MG 1.90 1.96 1.80  --    PHOS  --  2.9  --   --         CBC/Anemia Labs: Coags:    Recent Labs   Lab 12/06/22  1419 12/08/22  0403 12/09/22  0740 12/11/22  0502 12/12/22  0420   WBC 6.7   < > 6.8 7.5 7.2   HGB 8.9*   < > 8.8* 8.8* 9.2*   HCT 33.7*   < > 34.2* 33.4* 34.6*      < > 207 191 189   MCV 69.9*   < > 71.0* 70.3* 71.2*   RDW 21.4*   < > 21.4* 22.8* 24.0*   IRON 24*  --   --   --   --    FERRITIN 21.35  --    --   --   --     < > = values in this interval not displayed.    Recent Labs   Lab 12/06/22  1419   INR 1.89*        POCT Glucose: HbA1c:    No results for input(s): POCTGLUCOSE in the last 168 hours. Hemoglobin A1c   Date Value Ref Range Status   01/25/2022 5.0 <<=7.0 % Final          Impression:       Patient Active Problem List   Diagnosis    Arteriosclerosis of coronary artery    Peripheral vascular disease, unspecified    Stage 3a chronic kidney disease    A-fib    Gastroesophageal reflux disease    Mixed hyperlipidemia    Transient ischemic attack    HTN (hypertension)    S/P MVR (mitral valve replacement)    SOB (shortness of breath)    Valvular heart disease    Acute kidney injury superimposed on CKD    Advance care planning    Hypothyroidism    Esophageal dysphagia    Dysphagia, oropharyngeal phase    Acute on chronic combined systolic and diastolic heart failure    Anemia     Volume overload---> much improved  Alkalosis --> on diamox  Cardiorenal disease  RENÉ--> recovering  Plan:     Awaiting labs  DC jani  More management and DC planning accordingly       Antonio Almeida

## 2022-12-13 NOTE — PT/OT/SLP PROGRESS
Speech Language Pathology Department  Dysphagia Therapy Progress Note    Patient Name:  Dee Haji   MRN:  [id number]  Admitting Diagnosis: Acute on chronic combined systolic and diastolic heart failure    Recommendations:     General recommendations:  dysphagia therapy  Diet recommendations:  Easy to Chew Diet - IDDSI Level 7, Liquid Diet Level: Mildly thick liquids - IDDSI Level 2   Aspiration precautions: small bites/sips and slow rate    Discharge recommendations:  home health speech therapy   Barriers to safe discharge:  acuity of illness    Subjective     Patient awake and alert.    Pain/Comfort:  0/10    Spiritual/Cultural/Yarsanism Beliefs/Practices that affect care: no      Objective:     Oral Musculature Evaluation:  Oral Musculature: general weakness  Dentition: upper dentures, partials  Secretion Management: adequate  Mucosal Quality: good  Lingual Strength and Mobility: impaired strength  Voice Prior to PO Intake: clear    Therapeutic Activities:  Pt completed base of tongue and laryngeal x30 with moderate cues.      Assessment:     Pt continues to present with dysphagia requiring diet modification to reduce the risk of aspiration.     Goals:   Multidisciplinary Problems       SLP Goals          Problem: SLP    Goal Priority Disciplines Outcome   SLP Goal     SLP Ongoing, Progressing   Description:   LTG: Tolerate least restrictive PO diet with no clinical signs/sx aspiration  STGs:  1.  Complete base of tongue and laryngeal strengthening exercises with min cues  2.  Tolerate thermal stimulation to the anterior faucial pillars with 100% effortful swallow responses and delay less than 2 seconds.                       Patient Education:     Patient and spouse provided with verbal, written, and demonstration education regarding POC.  Understanding was verbalized.    Plan:     Will continue to follow and tx as appropriate.    Patient to be seen:  5 x/week   Plan of Care expires:  12/26/22  Plan of Care  reviewed with:  patient   SLP Follow-Up:  Yes       Time Tracking:     SLP Treatment Date:    12/13/22  Speech Start Time:   1110  Speech Stop Time:     1125   Speech Total Time (min):   15    Billable minutes:  Treatment of Swallow Dysfunction, 15        12/13/2022

## 2022-12-14 NOTE — DISCHARGE SUMMARY
[facility] Medicine Discharge Summary    Admit Date: 12/6/2022  Discharge Date and Time: 12/14/202211:48 AM  Admitting Physician:  Team  Discharging Physician: Tierra Johnson MD.  Primary Care Physician: Tierra Johnson MD  Consults: Nephrology    Discharge Diagnoses:  Acute on chronic heart failure, volume overload and cardiorenal     Hospital Course:   Dee Haji is a 88 y.o. female who  has a past medical history of A-fib, CAD (coronary artery disease), CVA (cerebrovascular accident), Dyspnea on exertion, GERD (gastroesophageal reflux disease), HTN (hypertension), Mixed hyperlipidemia, Renal disorder, S/P AVR (aortic valve replacement), Synovial cyst of knee, TIA (transient ischemic attack), Unspecified hemorrhoids, and Unspecified osteoarthritis, unspecified site.. The patient presented to [facility] on 12/6/2022 with a primary complaint of worsening shortness of breath     She was actually directed to the hospital after she was sent by Dr. Almeida and noted to be and fluid overload.  Her oxygen saturation was noted to be at 90% on room air.  Patient appears to be going into cardiorenal disease and that has been multiple conversations regarding her frail status with discussions regarding possible transition to palliative care depending on how she does.    After she was sent to emergency room, patient was initiated on IV Lasix at 80 mg b.i.d  Did well with diuresis and was eventually switched back to torsemide.  It took longer than expected for patient's volume status to normalize however she also had issues with retention and needed a John catheterization.  She was able to void after discontinuing of the John before discharge.  Her diuretics have been adjusted and she will be discharging home on oral torsemide.  Diamox was discontinued.  We will keep a close follow-up to monitor her volume status closely between Nephrology and myself.  Her other  medical comorbidities remained stable.  She was also evaluated by SLP and was cleared from their standpoint for diet.      Pt was seen and examined on the day of discharge  Vitals:  VITAL SIGNS: 24 HRS MIN & MAX LAST   No data recorded 97.9 °F (36.6 °C)   No data recorded 112/62   No data recorded  75   No data recorded 20   No data recorded 100 %       Physical Exam:  General: In no acute distress, afebrile, frail and appears tired  Chest: Clear to auscultation bilaterally  Heart: RRR, +S1, S2, 3/6 systolic murmur    Abdomen: Soft, nontender, BS +  MSK: Warm, no lower extremity edema, no clubbing or cyanosis  Neurologic:  Generalized weakness, nonfocal     Procedures Performed: No admission procedures for hospital encounter.     Significant Diagnostic Studies: See Full reports for all details    Recent Labs   Lab 12/11/22  0502 12/12/22  0420 12/13/22  0755   WBC 7.5 7.2 8.6   RBC 4.75 4.86 4.85   HGB 8.8* 9.2* 9.5*   HCT 33.4* 34.6* 35.9*   MCV 70.3* 71.2* 74.0*   MCH 18.5* 18.9* 19.6*   MCHC 26.3* 26.6* 26.5*   RDW 22.8* 24.0* 25.5*    189 185       Recent Labs   Lab 12/10/22  0356 12/11/22  0502 12/12/22  0420 12/13/22  0755    142 144 146*   K 3.5 3.3* 3.4* 3.5   CO2 38* 39* 40* 36*   BUN 59.4* 57.1* 53.6* 52.1*   CREATININE 1.57* 1.58* 1.47* 1.56*   CALCIUM 9.6 10.1 10.6* 10.6*   MG 1.96 1.80  --  1.90   ALBUMIN 2.6* 2.5* 2.6* 2.6*   ALKPHOS 124 184* 205* 299*   ALT 16 17 18 29   AST 49* 48* 49* 72*   BILITOT 0.9 0.8 0.9 0.8        Microbiology Results (last 7 days)       ** No results found for the last 168 hours. **             Fl Modified Barium Swallow Speech  See procedure notes from Speech Pathologist.    This procedure was auto-finalized.         Medication List        START taking these medications      potassium chloride SA 20 MEQ tablet  Commonly known as: K-DURVALENTINOR-CON  Take 1 tablet (20 mEq total) by mouth once daily.     spironolactone 25 MG tablet  Commonly known as: ALDACTONE  Take  1 tablet (25 mg total) by mouth once daily.            CHANGE how you take these medications      torsemide 20 MG Tab  Commonly known as: DEMADEX  Take 1 tablet (20 mg total) by mouth once daily.  What changed: how much to take            CONTINUE taking these medications      CENTRUM SILVER ORAL     cholecalciferol (vitamin D3) 125 mcg (5,000 unit) capsule     clopidogreL 75 mg tablet  Commonly known as: PLAVIX     docusate sodium 100 MG capsule  Commonly known as: COLACE     fenofibrate micronized 134 MG Cap  Commonly known as: LOFIBRA     hydrOXYzine HCL 25 MG tablet  Commonly known as: ATARAX  Take 1 tablet (25 mg total) by mouth every evening.     levothyroxine 50 MCG tablet  Commonly known as: SYNTHROID  Take 1 tablet (50 mcg total) by mouth before breakfast.     nitroGLYCERIN 0.4 MG SL tablet  Commonly known as: NITROSTAT     ondansetron 4 MG tablet  Commonly known as: ZOFRAN  Take 1 tablet (4 mg total) by mouth every 6 (six) hours as needed for Nausea.     PRESERVISION AREDS ORAL     rosuvastatin 40 MG Tab  Commonly known as: CRESTOR     tamsulosin 0.4 mg Cap  Commonly known as: FLOMAX  Take 1 capsule (0.4 mg total) by mouth once daily. for 14 days     XARELTO 2.5 mg Tab  Generic drug: rivaroxaban            ASK your doctor about these medications      ranolazine 500 MG Tb12  Commonly known as: RANEXA               Where to Get Your Medications        These medications were sent to [facility][id number] - [address] AT [address] [address]      Phone: [phone]   potassium chloride SA 20 MEQ tablet  spironolactone 25 MG tablet       Information about where to get these medications is not yet available    Ask your nurse or doctor about these medications  torsemide 20 MG Tab          Explained in detail to the patient about the discharge plan, medications, and follow-up visits. Pt understands and agrees with the treatment  plan  [place] [facility] [place] Condition: stable  Diet-    Medications Per DC med rec  Activities as tolerated   Follow-up Information       Tierra Johnson MD Follow up on 12/19/2022.    Specialty: Internal Medicine  Why: @ 11am  BMP prior to appointment.  Contact information:  [address] [place] [address]  [phone]               Antonio Almeida MD Follow up on 12/20/2022.    Specialty: Nephrology  Why: @ 1:30  BMP prior to appointment.  Contact information:  [facility] [place] [address]  [phone]               [facility] Follow up.    Specialties: Home Health Services, Home Therapy Services, Home Living Aide Services  Why: This is your current home health provider. They will see you the day after discharge from hospital.  Contact information:  [address] [place] [address]  [phone]             [facility], [facility] Follow up.    Why: This is your provider for home oxygen.  Contact information:  [address] [place] [address]  [phone]                         For further questions contact Dr Johnson's office    Discharge time 33 minutes    For worsening symptoms, chest pain, shortness of breath, increased abdominal pain, high grade fever, stroke or stroke like symptoms, immediately go to the nearest Emergency Room or call 911 as soon as possible.      Tierra Andrew M.D, on 12/14/2022. at 11:48 AM.

## 2022-12-14 NOTE — PROGRESS NOTES
C3 nurse spoke with patient's  for a TCC post hospital discharge follow up call. Stated patient lives in an assisted living facility and unable to verify medications patient takes. Stated assisted living staff responsible for giving patient medications. The patient has a scheduled HOS appointment with Tierra Johnson MD  on 12/19/2022 @ 11 am.  Appointment with Dr. Almeida 12/20/2022 @ 130 pm.

## 2022-12-19 PROBLEM — I50.43 ACUTE ON CHRONIC COMBINED SYSTOLIC AND DIASTOLIC HEART FAILURE: Chronic | Status: ACTIVE | Noted: 2022-01-01

## 2022-12-19 PROBLEM — I48.91 A-FIB: Chronic | Status: ACTIVE | Noted: 2022-01-01

## 2022-12-19 NOTE — PROGRESS NOTES
Transitional Care Note  Subjective:   Patient ID: Dee Haji is a 88 y.o. female.    Chief Complaint: Follow-up      Date of Hospital Discharge: 12/13/22   Family and/or Caretaker present at visit?  Yes  Diagnostic tests reviewed/disposition: No diagnosic tests pending after this hospitalization.  Disease/illness education: performed  Home health/community services discussion/referrals: Patient has home health established at   .   Establishment or re-establishment of referral orders for community resources: No other necessary community resources.   Discussion with other health care providers: No discussion with other health care providers necessary.     HPI: Dee Haji is a 88 y.o. female who  has a past medical history of A-fib, CAD (coronary artery disease), CVA (cerebrovascular accident), Dyspnea on exertion, GERD (gastroesophageal reflux disease), HTN (hypertension), Mixed hyperlipidemia, Renal disorder, S/P AVR (aortic valve replacement), Synovial cyst of knee, TIA (transient ischemic attack), Unspecified hemorrhoids, and Unspecified osteoarthritis, unspecified site.. The patient presented to [facility] on 12/6/2022 with a primary complaint of worsening shortness of breath     She was actually directed to the hospital after she was sent by Dr. Almeida and noted to be and fluid overload.  Her oxygen saturation was noted to be at 90% on room air.  P  Responded well to diuretics however took longer than expected for her to get to euvolemic status again.  Did well with diuresis and was eventually switched back to torsemide.  She did have some issues with retention and needed John catheterization however was able to void after discontinuing of the John before discharge.  Her diuretics were adjusted and she was be discharging home on oral torsemide.  Diamox was discontinued.  Her other medical comorbidities remained stable.  She was also evaluated by SLP and was cleared from their standpoint for diet.         Health maintenance reviewed with the patient.  Health maintenance completed:  Health Maintenance Topics with due status: Not Due       Topic Last Completion Date    Lipid Panel 2022    Aspirin/Antiplatelet Therapy 2022      Health maintenance due:  Health Maintenance Due   Topic Date Due    Influenza Vaccine (1) 2022      Review of Systems  A comprehensive review of systems is obtained and is essentially negative except for that stated in the HPI    History:     Past Medical History:   Diagnosis Date    A-fib     CAD (coronary artery disease)     CVA (cerebrovascular accident)     Dyspnea on exertion     GERD (gastroesophageal reflux disease)     HTN (hypertension)     Mixed hyperlipidemia     Renal disorder     S/P AVR (aortic valve replacement)     Synovial cyst of knee     TIA (transient ischemic attack)     Unspecified hemorrhoids     Unspecified osteoarthritis, unspecified site       Past Surgical History:   Procedure Laterality Date    ANGIOGRAM, CORONARY, WITH LEFT HEART CATHETERIZATION      BUNIONECTOMY Right     CATARACT EXTRACTION Right     CHOLECYSTECTOMY      CORONARY STENT PLACEMENT      ESOPHAGOGASTRODUODENOSCOPY N/A 2022    Procedure: EGD W/DIL;  Surgeon: CIARA Nunez MD;  Location: [facility] ENDOSCOPY;  Service: Gastroenterology;  Laterality: N/A;  48FR  Garcia    left lower lobectomy Left      Family History   Problem Relation Age of Onset    Heart failure Mother     Cancer Father     Diabetes Sister     Diabetes Brother       Social History     Tobacco Use    Smoking status: Former     Types: Cigarettes     Quit date: 1/3/1972     Years since quittin.9    Smokeless tobacco: Never    Tobacco comments:     quit in    Substance and Sexual Activity    Alcohol use: Not Currently    Drug use: Not Currently    Sexual activity: Not on file      Smoking Cessation:  Counseling given: Not Answered  Tobacco comments: quit in      Non-smoker    Allergies:  Review of patient's allergies indicates:  No Known Allergies  Objective:     Vitals:    12/19/22 1122   BP: 110/78   Temp: 98.4 °F (36.9 °C)   Weight: 53.5 kg (118 lb)   Height: 5' (1.524 m)     Body mass index is 23.05 kg/m².   Wt Readings from Last 4 Encounters:   12/19/22 53.5 kg (118 lb)   12/12/22 53.8 kg (118 lb 9.7 oz)   12/06/22 54.6 kg (120 lb 5.9 oz)   11/16/22 47.6 kg (105 lb)     Weight change: has been stable.    Physical Examination:   Physical Exam  Constitutional:       Appearance: Normal appearance.   HENT:      Head: Normocephalic and atraumatic.      Nose: Nose normal.      Mouth/Throat:      Mouth: Mucous membranes are moist.      Pharynx: Oropharynx is clear.   Eyes:      Extraocular Movements: Extraocular movements intact.      Pupils: Pupils are equal, round, and reactive to light.   Cardiovascular:      Rate and Rhythm: Normal rate and regular rhythm.      Pulses: Normal pulses.   Pulmonary:      Effort: Pulmonary effort is normal.      Breath sounds: Normal breath sounds.   Abdominal:      General: Bowel sounds are normal.      Palpations: Abdomen is soft.   Musculoskeletal:         General: Normal range of motion.      Cervical back: Normal range of motion and neck supple.   Skin:     General: Skin is warm.   Neurological:      General: No focal deficit present.      Mental Status: She is alert and oriented to person, place, and time. Mental status is at baseline.   Psychiatric:         Mood and Affect: Mood normal.       Diagnostic Tests:  Significant Imaging: I have reviewed and interpreted all pertinent imaging results/findings.  Fl Modified Barium Swallow Speech  See procedure notes from Speech Pathologist.    This procedure was auto-finalized.      Labs:   Lab Results   Component Value Date    WBC 4.4 (L) 12/16/2022    RBC 5.39 12/16/2022    HGB 10.8 (L) 12/16/2022    HCT 41.2 12/16/2022    MCV 76.4 (L) 12/16/2022    MCH 20.0 12/16/2022     12/16/2022     12/16/2022    K  4.0 12/16/2022    BUN 57.0 (H) 12/16/2022    CREATININE 1.67 (H) 12/16/2022    AST 81 (H) 12/16/2022    ALT 35 12/16/2022    BILITOT 1.2 12/16/2022    TSH 2.2040 12/10/2022    HGBA1C 5.0 01/25/2022        Laboratory Data:  All pertinent labs have been reviewed.  I have reviewed the following records today:     Details:   [x] Labs [] Internal  [] External    [] Micro [] Internal  [] External    [] Pathology [] Internal  [] External    [x] Imaging [] Internal  [] External    [x] Cardiology Procedures [] Internal  [] External    [x] Provider Records [] Internal  [] External    [] Other [] Internal  [] External      Medications:     Medication List with Changes/Refills   Current Medications    CHOLECALCIFEROL, VITAMIN D3, 125 MCG (5,000 UNIT) CAPSULE    Take 5,000 Int'l Units by mouth once daily.    CLOPIDOGREL (PLAVIX) 75 MG TABLET    Take 75 mg by mouth once daily.    DOCUSATE SODIUM (COLACE) 100 MG CAPSULE    Take 100 mg by mouth 2 (two) times daily.    FENOFIBRATE MICRONIZED (LOFIBRA) 134 MG CAP    Take 134 mg by mouth once daily at 6am.    FOLIC ACID/MULTIVIT-MIN/LUTEIN (CENTRUM SILVER ORAL)    Take 1 tablet by mouth once daily.    HYDROXYZINE HCL (ATARAX) 25 MG TABLET    Take 1 tablet (25 mg total) by mouth every evening.    LEVOTHYROXINE (SYNTHROID) 50 MCG TABLET    Take 1 tablet (50 mcg total) by mouth before breakfast.    NITROGLYCERIN (NITROSTAT) 0.4 MG SL TABLET    Place 0.4 mg under the tongue every 5 (five) minutes as needed. X 3 doses    ONDANSETRON (ZOFRAN) 4 MG TABLET    Take 1 tablet (4 mg total) by mouth every 6 (six) hours as needed for Nausea.    POTASSIUM CHLORIDE SA (K-DUR,KLOR-CON) 20 MEQ TABLET    Take 1 tablet (20 mEq total) by mouth once daily.    RANOLAZINE (RANEXA) 500 MG TB12    Take 500 mg by mouth 2 (two) times daily.    RIVAROXABAN (XARELTO) 2.5 MG TAB    Take 2.5 mg by mouth 2 (two) times daily.    ROSUVASTATIN (CRESTOR) 40 MG TAB    Take 40 mg by mouth once daily.    SPIRONOLACTONE  (ALDACTONE) 25 MG TABLET    Take 1 tablet (25 mg total) by mouth once daily.    TAMSULOSIN (FLOMAX) 0.4 MG CAP    Take 1 capsule (0.4 mg total) by mouth once daily. for 14 days    TORSEMIDE (DEMADEX) 20 MG TAB    Take 1 tablet (20 mg total) by mouth once daily.    VIT A/VIT C/VIT E/ZINC/COPPER (PRESERVISION AREDS ORAL)    Take 1 capsule by mouth once daily.     Assessment:     1. S/P MVR (mitral valve replacement)    2. Primary hypertension    3. Arteriosclerosis of coronary artery    4. Acute on chronic combined systolic and diastolic heart failure    5. Paroxysmal atrial fibrillation    6. Hypothyroidism, unspecified type    7. Hospital discharge follow-up    8. Gastroesophageal reflux disease without esophagitis      Plan:     Problem List Items Addressed This Visit          Cardiac/Vascular    Arteriosclerosis of coronary artery (Chronic)    Current Assessment & Plan     -stable, no complaints of chest pain         A-fib (Chronic)    HTN (hypertension) (Chronic)    S/P MVR (mitral valve replacement) - Primary (Chronic)    Acute on chronic combined systolic and diastolic heart failure (Chronic)    Current Assessment & Plan     -volume status is very labile   -currently remains euvolemic after adjustments being made to her diuretics and after recent hospital admission for heart failure exacerbation with cardiorenal syndrome   -advised on importance of volume restriction and low-sodium diet   -followed by home health            Endocrine    Hypothyroidism (Chronic)    Current Assessment & Plan       Continue levothyroxine 50 mcg p.o. daily  Take medicine on an empty stomach with water (no other medications or beverages). Wait 30 minutes to eat or drink.  Report any symptoms of thinning hair, breaking nails, fatigue, weight gain or loss, palpitations.             GI    Gastroesophageal reflux disease (Chronic)    Current Assessment & Plan     -patient advised to avoid foods that trigger acid reflux and he had at  least 2 hours before bedtime.            Other    RESOLVED: Hospital discharge follow-up    Current Assessment & Plan     -patient doing well post discharge, currently still using oxygen however maintaining sats at 95+%  -can slowly start to wean down at least during the daytime   -medications need to be tweaked, volume status seems to be much better controlled now and patient has an appointment scheduled to see Dr. Almeida with Nephrology in a.m..             1.  Continue current medications  2.  Side effects and expected results discussed  3.  Diet and exercise as tolerated  4.  Nutritional support  5.  Health maintenance updated accordingly  6.  Call with increased complaints or concerns  7.      Follow Up:   No follow-ups on file.    I spent greater than 45 minutes today both in chart review and greater than 50% of that time in discussion with the patient regarding health maintenance, diagnoses, diagnostic tests, medications, treatments, symptom management, expected results and adverse effects. Patient verbalized understanding and all questions were answered.    This note includes dictation done on M*Modal word recognition program.  There are word recognition mistakes that are occasionally missed on review.

## 2022-12-19 NOTE — ASSESSMENT & PLAN NOTE
Continue levothyroxine 50 mcg p.o. daily  Take medicine on an empty stomach with water (no other medications or beverages). Wait 30 minutes to eat or drink.  Report any symptoms of thinning hair, breaking nails, fatigue, weight gain or loss, palpitations.

## 2022-12-19 NOTE — ASSESSMENT & PLAN NOTE
-patient doing well post discharge, currently still using oxygen however maintaining sats at 95+%  -can slowly start to wean down at least during the daytime   -medications need to be tweaked, volume status seems to be much better controlled now and patient has an appointment scheduled to see Dr. Almeida with Nephrology in a.m..

## 2022-12-19 NOTE — ASSESSMENT & PLAN NOTE
-volume status is very labile   -currently remains euvolemic after adjustments being made to her diuretics and after recent hospital admission for heart failure exacerbation with cardiorenal syndrome   -advised on importance of volume restriction and low-sodium diet   -followed by home health

## 2022-12-20 NOTE — PROGRESS NOTES
[facility] Progress Note      HPI  Dee Haji, 88 y.o. female,  has a past medical history of A-fib, CAD (coronary artery disease), CVA (cerebrovascular accident), Dyspnea on exertion, GERD (gastroesophageal reflux disease), HTN (hypertension), Mixed hyperlipidemia, Renal disorder, S/P AVR (aortic valve replacement), Synovial cyst of knee, TIA (transient ischemic attack), Unspecified hemorrhoids, and Unspecified osteoarthritis, unspecified site. 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 Hospitalization required aggressive diuresis.     She is slowly getting stronger.    Patient denies taking NSAIDs, new antibiotics or recreational drugs. Denies recent episode of dehydration, diarrhea, vomiting, acute illness, hospitalization, recent angiograms or exposure to IV radiocontrast.      Medical History:   Past Medical History:   Diagnosis Date    A-fib     CAD (coronary artery disease)     CVA (cerebrovascular accident)     Dyspnea on exertion     GERD (gastroesophageal reflux disease)     HTN (hypertension)     Mixed hyperlipidemia     Renal disorder     S/P AVR (aortic valve replacement)     Synovial cyst of knee     TIA (transient ischemic attack)     Unspecified hemorrhoids     Unspecified osteoarthritis, unspecified site        Surgical History:   Past Surgical History:   Procedure Laterality Date    ANGIOGRAM, CORONARY, WITH LEFT HEART CATHETERIZATION      BUNIONECTOMY Right     CATARACT EXTRACTION Right     CHOLECYSTECTOMY      CORONARY STENT PLACEMENT      ESOPHAGOGASTRODUODENOSCOPY N/A 11/17/2022    Procedure: EGD W/DIL;  Surgeon: CIARA Nunez MD;  Location: [facility] ENDOSCOPY;  Service: Gastroenterology;  Laterality: N/A;  48FR  Garcia    left lower lobectomy Left        Family History:   Family History   Problem Relation Age of Onset    Heart failure Mother     Cancer Father     Diabetes Sister     Diabetes  Brother        Social History:   Social History     Tobacco Use    Smoking status: Former     Types: Cigarettes     Quit date: 1/3/1972     Years since quittin.9    Smokeless tobacco: Never    Tobacco comments:     quit in    Substance Use Topics    Alcohol use: Not Currently       Allergies:  Review of patient's allergies indicates:  No Known Allergies    Medications:    Current Outpatient Medications:     cholecalciferol, vitamin D3, 125 mcg (5,000 unit) capsule, Take 5,000 Int'l Units by mouth once daily., Disp: , Rfl:     clopidogreL (PLAVIX) 75 mg tablet, Take 75 mg by mouth once daily., Disp: , Rfl:     docusate sodium (COLACE) 100 MG capsule, Take 100 mg by mouth 2 (two) times daily., Disp: , Rfl:     fenofibrate micronized (LOFIBRA) 134 MG Cap, Take 134 mg by mouth once daily at 6am., Disp: , Rfl:     folic acid/multivit-min/lutein (CENTRUM SILVER ORAL), Take 1 tablet by mouth once daily., Disp: , Rfl:     hydrOXYzine HCL (ATARAX) 25 MG tablet, Take 1 tablet (25 mg total) by mouth every evening., Disp: 30 tablet, Rfl: 5    levothyroxine (SYNTHROID) 50 MCG tablet, Take 1 tablet (50 mcg total) by mouth before breakfast., Disp: 30 tablet, Rfl: 11    nitroGLYCERIN (NITROSTAT) 0.4 MG SL tablet, Place 0.4 mg under the tongue every 5 (five) minutes as needed. X 3 doses, Disp: , Rfl:     ondansetron (ZOFRAN) 4 MG tablet, Take 1 tablet (4 mg total) by mouth every 6 (six) hours as needed for Nausea., Disp: 24 tablet, Rfl: 0    potassium chloride SA (K-DUR,KLOR-CON) 20 MEQ tablet, Take 1 tablet (20 mEq total) by mouth once daily., Disp: 30 tablet, Rfl: 2    ranolazine (RANEXA) 500 MG Tb12, Take 500 mg by mouth 2 (two) times daily., Disp: , Rfl:     rivaroxaban (XARELTO) 2.5 mg Tab, Take 2.5 mg by mouth 2 (two) times daily., Disp: , Rfl:     rosuvastatin (CRESTOR) 40 MG Tab, Take 40 mg by mouth once daily., Disp: , Rfl:     spironolactone (ALDACTONE) 25 MG tablet, Take 1 tablet (25 mg total) by mouth once  daily., Disp: 30 tablet, Rfl: 11    tamsulosin (FLOMAX) 0.4 mg Cap, Take 1 capsule (0.4 mg total) by mouth once daily. for 14 days, Disp: 14 capsule, Rfl: 0    torsemide (DEMADEX) 20 MG Tab, Take 1 tablet (20 mg total) by mouth once daily., Disp: 30 tablet, Rfl: 11    vit A/vit C/vit E/zinc/copper (PRESERVISION AREDS ORAL), Take 1 capsule by mouth once daily., Disp: , Rfl:        ROS:    Constitutional: Denies fever, fatigue, generalized weakness, night sweats, still weak, but better ( on wheelchair and home Oxygen)  Skin: Denies wounds, no rashes, no itching, no new skin lesions  HEENT: Denies acute change in hearing or vision, tinnitus, or dysphagia  Respiratory:  Denies cough, shortness of breath, or wheezing  Cardiovascular: Denies chest pain, palpitations, or swelling  Gastrointestional: Denies abdominal pain, nausea, vomiting, diarrhea, or constipation  Genitourinary: Denies dysuria, hematuria, or incontinence; reports able to empty bladder  Musculoskeletal: Denies myalgias, back pain, decreased ROM or focal weakness  Neurological: Denies headaches, seizures, dizziness, paresthesias or weakness  Hematological: Denies unusual bruising or bleeding  Psychiatric: Denies hallucinations, depression, or confusion      Vital Signs:  /76 (BP Location: Left arm, Patient Position: Sitting)   Pulse 82   Temp 97.5 °F (36.4 °C) (Temporal)   Resp 20   Ht 5' (1.524 m)   Wt 42.1 kg (92 lb 13 oz)   LMP  (LMP Unknown)   SpO2 95%   BMI 18.13 kg/m²   Body mass index is 18.13 kg/m².      Physical Exam:    General: no acute distress, awake, alert, pale  Eyes: PERRLA, EOMI, conjunctiva clear, eyelids without swelling  HENT: atraumatic, oropharynx and nasal mucosa patent  Neck: full ROM, +JVD, no thyromegaly or lymphadenopathy  Respiratory: equal, unlabored, clear to auscultation A/P  Cardiovascular: RRR , +RANDALL g2/6; radial and pedal pulses felt  Edema: none  Gastrointestinal: soft, non-tender, non-distended; positive  bowel sounds; no masses to palpation  Musculoskeletal: wheelchair  Integumentary: warm, dry; no rashes, wounds, or new skin lesions  Neurological: oriented, appropriate, no acute focal deficits      Labs:        Component Value Date/Time     12/16/2022 1030     (H) 12/13/2022 0755    K 4.0 12/16/2022 1030    K 3.5 12/13/2022 0755    CO2 31 12/16/2022 1030    CO2 36 (H) 12/13/2022 0755    BUN 57.0 (H) 12/16/2022 1030    BUN 52.1 (H) 12/13/2022 0755    CREATININE 1.67 (H) 12/16/2022 1030    CREATININE 1.56 (H) 12/13/2022 0755    EGFRNONAA 42 06/21/2022 1314    EGFRNONAA 22 06/20/2022 0215    CALCIUM 10.4 (H) 12/16/2022 1030    CALCIUM 10.6 (H) 12/13/2022 0755    PHOS 3.2 12/13/2022 0755    .2 (H) 12/01/2022 0746            Component Value Date/Time    WBC 4.4 (L) 12/16/2022 1030    WBC 8.6 12/13/2022 0755    HGB 10.8 (L) 12/16/2022 1030    HGB 9.5 (L) 12/13/2022 0755    HCT 41.2 12/16/2022 1030    HCT 35.9 (L) 12/13/2022 0755     12/16/2022 1030     12/13/2022 0755         Impression:    Patient Active Problem List   Diagnosis    Arteriosclerosis of coronary artery    Peripheral vascular disease, unspecified    Stage 3a chronic kidney disease    A-fib    Gastroesophageal reflux disease    Mixed hyperlipidemia    Transient ischemic attack    HTN (hypertension)    S/P MVR (mitral valve replacement)    SOB (shortness of breath)    Valvular heart disease    Acute kidney injury superimposed on CKD    Advance care planning    Hypothyroidism    Esophageal dysphagia    Dysphagia, oropharyngeal phase    Acute on chronic combined systolic and diastolic heart failure    Anemia     1. Hypervolemia, unspecified hypervolemia type        2. RENÉ (acute kidney injury)        3. Cardiorenal disease        4. Alkalosis          CKD3b;cardiorenal disease; stable, at baseline  Severe cardiorenal disease with renal function fluctuating with CO and Diuresis  HTN: stable  No S/S of fluid overload    Plan:  FU  with labs in 3 weeks  Continue current medications    Avoid NSAIDs (Aleve, Mobic, Celebrex, Ibuprofen, Advil, Toradol and Diclofenac).       Gianni Payton

## 2023-01-01 ENCOUNTER — PATIENT OUTREACH (OUTPATIENT)
Dept: ADMINISTRATIVE | Facility: CLINIC | Age: 88
End: 2023-01-01
Payer: MEDICARE

## 2023-01-01 ENCOUNTER — OFFICE VISIT (OUTPATIENT)
Dept: INTERNAL MEDICINE | Facility: CLINIC | Age: 88
End: 2023-01-01
Payer: MEDICARE

## 2023-01-01 ENCOUNTER — LAB REQUISITION (OUTPATIENT)
Dept: LAB | Facility: HOSPITAL | Age: 88
End: 2023-01-01
Payer: MEDICARE

## 2023-01-01 ENCOUNTER — PATIENT OUTREACH (OUTPATIENT)
Dept: HOME HEALTH SERVICES | Facility: HOSPITAL | Age: 88
End: 2023-01-01
Payer: MEDICARE

## 2023-01-01 ENCOUNTER — HOSPITAL ENCOUNTER (INPATIENT)
Facility: HOSPITAL | Age: 88
LOS: 1 days | DRG: 085 | End: 2023-02-02
Attending: INTERNAL MEDICINE | Admitting: INTERNAL MEDICINE
Payer: MEDICARE

## 2023-01-01 ENCOUNTER — DOCUMENT SCAN (OUTPATIENT)
Dept: HOME HEALTH SERVICES | Facility: HOSPITAL | Age: 88
End: 2023-01-01
Payer: MEDICARE

## 2023-01-01 ENCOUNTER — OUTPATIENT CASE MANAGEMENT (OUTPATIENT)
Dept: ADMINISTRATIVE | Facility: OTHER | Age: 88
End: 2023-01-01
Payer: MEDICARE

## 2023-01-01 ENCOUNTER — HOSPITAL ENCOUNTER (EMERGENCY)
Facility: HOSPITAL | Age: 88
Discharge: HOME | End: 2023-01-27
Attending: INTERNAL MEDICINE
Payer: MEDICARE

## 2023-01-01 ENCOUNTER — EXTERNAL HOME HEALTH (OUTPATIENT)
Dept: HOME HEALTH SERVICES | Facility: HOSPITAL | Age: 88
End: 2023-01-01
Payer: MEDICARE

## 2023-01-01 ENCOUNTER — HOSPITAL ENCOUNTER (INPATIENT)
Facility: HOSPITAL | Age: 88
LOS: 6 days | Discharge: HOME | DRG: 683 | End: 2023-01-16
Attending: STUDENT IN AN ORGANIZED HEALTH CARE EDUCATION/TRAINING PROGRAM | Admitting: INTERNAL MEDICINE
Payer: MEDICARE

## 2023-01-01 ENCOUNTER — OFFICE VISIT (OUTPATIENT)
Dept: NEPHROLOGY | Facility: CLINIC | Age: 88
DRG: 085 | End: 2023-01-01
Payer: MEDICARE

## 2023-01-01 VITALS
DIASTOLIC BLOOD PRESSURE: 65 MMHG | TEMPERATURE: 98 F | SYSTOLIC BLOOD PRESSURE: 99 MMHG | BODY MASS INDEX: 16.97 KG/M2 | HEART RATE: 99 BPM | HEIGHT: 60 IN | HEIGHT: 60 IN | BODY MASS INDEX: 16.88 KG/M2 | TEMPERATURE: 98 F | SYSTOLIC BLOOD PRESSURE: 94 MMHG | OXYGEN SATURATION: 93 % | DIASTOLIC BLOOD PRESSURE: 66 MMHG | WEIGHT: 86 LBS | OXYGEN SATURATION: 94 % | HEART RATE: 97 BPM | WEIGHT: 86.44 LBS | RESPIRATION RATE: 18 BRPM

## 2023-01-01 VITALS
HEART RATE: 81 BPM | WEIGHT: 95 LBS | SYSTOLIC BLOOD PRESSURE: 121 MMHG | TEMPERATURE: 98 F | RESPIRATION RATE: 20 BRPM | BODY MASS INDEX: 18.55 KG/M2 | DIASTOLIC BLOOD PRESSURE: 74 MMHG | OXYGEN SATURATION: 97 %

## 2023-01-01 VITALS
WEIGHT: 84.44 LBS | BODY MASS INDEX: 16.58 KG/M2 | RESPIRATION RATE: 20 BRPM | TEMPERATURE: 97 F | HEIGHT: 60 IN | SYSTOLIC BLOOD PRESSURE: 110 MMHG | DIASTOLIC BLOOD PRESSURE: 80 MMHG | OXYGEN SATURATION: 96 % | HEART RATE: 87 BPM

## 2023-01-01 VITALS
DIASTOLIC BLOOD PRESSURE: 48 MMHG | BODY MASS INDEX: 17.47 KG/M2 | HEIGHT: 60 IN | SYSTOLIC BLOOD PRESSURE: 78 MMHG | TEMPERATURE: 102 F | HEART RATE: 104 BPM | WEIGHT: 89 LBS | RESPIRATION RATE: 16 BRPM | OXYGEN SATURATION: 97 %

## 2023-01-01 VITALS — SYSTOLIC BLOOD PRESSURE: 83 MMHG | DIASTOLIC BLOOD PRESSURE: 61 MMHG

## 2023-01-01 DIAGNOSIS — N18.31 CHRONIC KIDNEY DISEASE, STAGE 3A: ICD-10-CM

## 2023-01-01 DIAGNOSIS — S32.030A COMPRESSION FRACTURE OF L3 VERTEBRA, INITIAL ENCOUNTER: Primary | ICD-10-CM

## 2023-01-01 DIAGNOSIS — Z51.5 PALLIATIVE CARE STATUS: ICD-10-CM

## 2023-01-01 DIAGNOSIS — N17.9 ACUTE KIDNEY FAILURE, UNSPECIFIED: ICD-10-CM

## 2023-01-01 DIAGNOSIS — I13.10 CARDIORENAL DISEASE: ICD-10-CM

## 2023-01-01 DIAGNOSIS — I48.91 ATRIAL FIBRILLATION: ICD-10-CM

## 2023-01-01 DIAGNOSIS — F32.A DEPRESSION, UNSPECIFIED DEPRESSION TYPE: ICD-10-CM

## 2023-01-01 DIAGNOSIS — I50.43 ACUTE ON CHRONIC COMBINED SYSTOLIC AND DIASTOLIC HEART FAILURE: Chronic | ICD-10-CM

## 2023-01-01 DIAGNOSIS — E03.9 HYPOTHYROIDISM, UNSPECIFIED: ICD-10-CM

## 2023-01-01 DIAGNOSIS — D50.9 IRON DEFICIENCY ANEMIA, UNSPECIFIED: ICD-10-CM

## 2023-01-01 DIAGNOSIS — I50.43 ACUTE ON CHRONIC COMBINED SYSTOLIC (CONGESTIVE) AND DIASTOLIC (CONGESTIVE) HEART FAILURE: ICD-10-CM

## 2023-01-01 DIAGNOSIS — I50.42 CHRONIC COMBINED SYSTOLIC AND DIASTOLIC CONGESTIVE HEART FAILURE: Primary | ICD-10-CM

## 2023-01-01 DIAGNOSIS — M25.519 SHOULDER PAIN: ICD-10-CM

## 2023-01-01 DIAGNOSIS — R79.89 ELEVATED TROPONIN I LEVEL: ICD-10-CM

## 2023-01-01 DIAGNOSIS — N17.9 ACUTE RENAL FAILURE: ICD-10-CM

## 2023-01-01 DIAGNOSIS — Z09 HOSPITAL DISCHARGE FOLLOW-UP: ICD-10-CM

## 2023-01-01 DIAGNOSIS — I13.0 HYPERTENSIVE HEART AND CHRONIC KIDNEY DISEASE WITH HEART FAILURE AND STAGE 1 THROUGH STAGE 4 CHRONIC KIDNEY DISEASE, OR UNSPECIFIED CHRONIC KIDNEY DISEASE: ICD-10-CM

## 2023-01-01 DIAGNOSIS — N18.9 ACUTE KIDNEY INJURY SUPERIMPOSED ON CKD: Primary | ICD-10-CM

## 2023-01-01 DIAGNOSIS — I10 PRIMARY HYPERTENSION: Chronic | ICD-10-CM

## 2023-01-01 DIAGNOSIS — S06.5XAA SUBDURAL HEMATOMA: Primary | ICD-10-CM

## 2023-01-01 DIAGNOSIS — I25.10 ATHEROSCLEROTIC HEART DISEASE OF NATIVE CORONARY ARTERY WITHOUT ANGINA PECTORIS: ICD-10-CM

## 2023-01-01 DIAGNOSIS — N17.9 ACUTE KIDNEY INJURY SUPERIMPOSED ON CKD: Primary | ICD-10-CM

## 2023-01-01 DIAGNOSIS — N18.31 STAGE 3A CHRONIC KIDNEY DISEASE: Chronic | ICD-10-CM

## 2023-01-01 DIAGNOSIS — I10 PRIMARY HYPERTENSION: ICD-10-CM

## 2023-01-01 DIAGNOSIS — R00.9 ABNORMAL HEART RATE: ICD-10-CM

## 2023-01-01 DIAGNOSIS — Z98.61 HISTORY OF PTCA: Chronic | ICD-10-CM

## 2023-01-01 DIAGNOSIS — I62.00 SUBDURAL HEMORRHAGE: ICD-10-CM

## 2023-01-01 DIAGNOSIS — I10 PRIMARY HYPERTENSION: Primary | Chronic | ICD-10-CM

## 2023-01-01 DIAGNOSIS — N18.32 STAGE 3B CHRONIC KIDNEY DISEASE: ICD-10-CM

## 2023-01-01 DIAGNOSIS — E03.9 ACQUIRED HYPOTHYROIDISM: ICD-10-CM

## 2023-01-01 DIAGNOSIS — R53.1 WEAKNESS: ICD-10-CM

## 2023-01-01 DIAGNOSIS — E87.5 HYPERKALEMIA: Primary | ICD-10-CM

## 2023-01-01 DIAGNOSIS — F32.A DEPRESSION, UNSPECIFIED DEPRESSION TYPE: Primary | ICD-10-CM

## 2023-01-01 DIAGNOSIS — Z95.2 S/P AVR (AORTIC VALVE REPLACEMENT): Primary | ICD-10-CM

## 2023-01-01 DIAGNOSIS — E87.5 HYPERKALEMIA: ICD-10-CM

## 2023-01-01 DIAGNOSIS — I48.0 PAROXYSMAL ATRIAL FIBRILLATION: Chronic | ICD-10-CM

## 2023-01-01 DIAGNOSIS — E86.0 DEHYDRATION: ICD-10-CM

## 2023-01-01 DIAGNOSIS — R11.0 NAUSEA: ICD-10-CM
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
## 2023-01-01 DIAGNOSIS — S32.008A OTHER CLOSED FRACTURE OF LUMBAR VERTEBRA, UNSPECIFIED LUMBAR VERTEBRAL LEVEL, INITIAL ENCOUNTER: ICD-10-CM

## 2023-01-01 DIAGNOSIS — R00.2 HEART PALPITATIONS: ICD-10-CM

## 2023-01-01 LAB
ADDITIONAL FINDINGS (OHS): ABNORMAL
ALBUMIN SERPL-MCNC: 2.5 G/DL (ref 3.4–4.8)
ALBUMIN SERPL-MCNC: 3 G/DL (ref 3.4–4.8)
ALBUMIN SERPL-MCNC: 3.3 G/DL (ref 3.4–4.8)
ALBUMIN SERPL-MCNC: 3.4 G/DL (ref 3.4–4.8)
ALBUMIN SERPL-MCNC: 3.6 G/DL (ref 3.4–4.8)
ALBUMIN SERPL-MCNC: 3.7 G/DL (ref 3.4–4.8)
ALBUMIN/GLOB SERPL: 0.8 RATIO (ref 1.1–2)
ALBUMIN/GLOB SERPL: 0.9 RATIO (ref 1.1–2)
ALP SERPL-CCNC: 107 UNIT/L (ref 40–150)
ALP SERPL-CCNC: 109 UNIT/L (ref 40–150)
ALP SERPL-CCNC: 110 UNIT/L (ref 40–150)
ALP SERPL-CCNC: 145 UNIT/L (ref 40–150)
ALP SERPL-CCNC: 73 UNIT/L (ref 40–150)
ALP SERPL-CCNC: 79 UNIT/L (ref 40–150)
ALP SERPL-CCNC: 85 UNIT/L (ref 40–150)
ALP SERPL-CCNC: 87 UNIT/L (ref 40–150)
ALP SERPL-CCNC: 94 UNIT/L (ref 40–150)
ALT SERPL-CCNC: 10 UNIT/L (ref 0–55)
ALT SERPL-CCNC: 12 UNIT/L (ref 0–55)
ALT SERPL-CCNC: 13 UNIT/L (ref 0–55)
ALT SERPL-CCNC: 13 UNIT/L (ref 0–55)
ALT SERPL-CCNC: 17 UNIT/L (ref 0–55)
ALT SERPL-CCNC: 18 UNIT/L (ref 0–55)
ALT SERPL-CCNC: 20 UNIT/L (ref 0–55)
ALT SERPL-CCNC: 22 UNIT/L (ref 0–55)
ALT SERPL-CCNC: 9 UNIT/L (ref 0–55)
ANISOCYTOSIS BLD QL SMEAR: ABNORMAL
ANISOCYTOSIS BLD QL SMEAR: ABNORMAL
APPEARANCE UR: ABNORMAL
APPEARANCE UR: CLEAR
APTT PPP: 34.9 SECONDS (ref 23.2–33.7)
APTT PPP: 38.9 SECONDS (ref 23.4–33.9)
AST SERPL-CCNC: 38 UNIT/L (ref 5–34)
AST SERPL-CCNC: 40 UNIT/L (ref 5–34)
AST SERPL-CCNC: 42 UNIT/L (ref 5–34)
AST SERPL-CCNC: 48 UNIT/L (ref 5–34)
AST SERPL-CCNC: 50 UNIT/L (ref 5–34)
AST SERPL-CCNC: 53 UNIT/L (ref 5–34)
AST SERPL-CCNC: 53 UNIT/L (ref 5–34)
AST SERPL-CCNC: 56 UNIT/L (ref 5–34)
AST SERPL-CCNC: 71 UNIT/L (ref 5–34)
BACTERIA #/AREA URNS AUTO: ABNORMAL /HPF
BACTERIA UR CULT: ABNORMAL
BACTERIA UR CULT: ABNORMAL
BASOPHILS # BLD AUTO: 0.05 X10(3)/MCL (ref 0–0.2)
BASOPHILS # BLD AUTO: 0.05 X10(3)/MCL (ref 0–0.2)
BASOPHILS # BLD AUTO: 0.06 X10(3)/MCL (ref 0–0.2)
BASOPHILS # BLD AUTO: 0.07 X10(3)/MCL (ref 0–0.2)
BASOPHILS # BLD AUTO: 0.07 X10(3)/MCL (ref 0–0.2)
BASOPHILS NFR BLD AUTO: 0.5 %
BASOPHILS NFR BLD AUTO: 0.7 %
BASOPHILS NFR BLD AUTO: 0.8 %
BASOPHILS NFR BLD AUTO: 0.8 %
BASOPHILS NFR BLD AUTO: 0.9 %
BASOPHILS NFR BLD AUTO: 1 %
BASOPHILS NFR BLD AUTO: 1.1 %
BILIRUB UR QL STRIP.AUTO: NEGATIVE MG/DL
BILIRUB UR QL STRIP.AUTO: NEGATIVE MG/DL
BILIRUBIN DIRECT+TOT PNL SERPL-MCNC: 0.6 MG/DL
BILIRUBIN DIRECT+TOT PNL SERPL-MCNC: 0.7 MG/DL
BILIRUBIN DIRECT+TOT PNL SERPL-MCNC: 0.8 MG/DL
BILIRUBIN DIRECT+TOT PNL SERPL-MCNC: 0.8 MG/DL
BILIRUBIN DIRECT+TOT PNL SERPL-MCNC: 0.9 MG/DL
BILIRUBIN DIRECT+TOT PNL SERPL-MCNC: 0.9 MG/DL
BILIRUBIN DIRECT+TOT PNL SERPL-MCNC: 1.1 MG/DL
BILIRUBIN DIRECT+TOT PNL SERPL-MCNC: 1.2 MG/DL
BILIRUBIN DIRECT+TOT PNL SERPL-MCNC: 1.2 MG/DL
BUN SERPL-MCNC: 22.6 MG/DL (ref 9.8–20.1)
BUN SERPL-MCNC: 33.9 MG/DL (ref 9.8–20.1)
BUN SERPL-MCNC: 41.5 MG/DL (ref 9.8–20.1)
BUN SERPL-MCNC: 60.3 MG/DL (ref 9.8–20.1)
BUN SERPL-MCNC: 62.3 MG/DL (ref 9.8–20.1)
BUN SERPL-MCNC: 68.9 MG/DL (ref 9.8–20.1)
BUN SERPL-MCNC: 73.7 MG/DL (ref 9.8–20.1)
BUN SERPL-MCNC: 80.9 MG/DL (ref 9.8–20.1)
BUN SERPL-MCNC: 85.2 MG/DL (ref 9.8–20.1)
CALCIUM SERPL-MCNC: 10.4 MG/DL (ref 8.4–10.2)
CALCIUM SERPL-MCNC: 10.6 MG/DL (ref 8.4–10.2)
CALCIUM SERPL-MCNC: 10.9 MG/DL (ref 8.4–10.2)
CALCIUM SERPL-MCNC: 10.9 MG/DL (ref 8.4–10.2)
CALCIUM SERPL-MCNC: 11 MG/DL (ref 8.4–10.2)
CALCIUM SERPL-MCNC: 9.1 MG/DL (ref 8.4–10.2)
CALCIUM SERPL-MCNC: 9.1 MG/DL (ref 8.4–10.2)
CALCIUM SERPL-MCNC: 9.3 MG/DL (ref 8.4–10.2)
CALCIUM SERPL-MCNC: 9.7 MG/DL (ref 8.4–10.2)
CHLORIDE SERPL-SCNC: 100 MMOL/L (ref 98–107)
CHLORIDE SERPL-SCNC: 104 MMOL/L (ref 98–107)
CHLORIDE SERPL-SCNC: 107 MMOL/L (ref 98–107)
CHLORIDE SERPL-SCNC: 108 MMOL/L (ref 98–107)
CHLORIDE SERPL-SCNC: 94 MMOL/L (ref 98–107)
CHLORIDE SERPL-SCNC: 96 MMOL/L (ref 98–107)
CHLORIDE SERPL-SCNC: 96 MMOL/L (ref 98–107)
CHLORIDE SERPL-SCNC: 98 MMOL/L (ref 98–107)
CHLORIDE SERPL-SCNC: 98 MMOL/L (ref 98–107)
CO2 SERPL-SCNC: 25 MMOL/L (ref 23–31)
CO2 SERPL-SCNC: 25 MMOL/L (ref 23–31)
CO2 SERPL-SCNC: 26 MMOL/L (ref 23–31)
CO2 SERPL-SCNC: 27 MMOL/L (ref 23–31)
CO2 SERPL-SCNC: 28 MMOL/L (ref 23–31)
CO2 SERPL-SCNC: 30 MMOL/L (ref 23–31)
CO2 SERPL-SCNC: 31 MMOL/L (ref 23–31)
COLOR UR AUTO: YELLOW
COLOR UR AUTO: YELLOW
CREAT SERPL-MCNC: 0.96 MG/DL (ref 0.55–1.02)
CREAT SERPL-MCNC: 1.23 MG/DL (ref 0.55–1.02)
CREAT SERPL-MCNC: 1.65 MG/DL (ref 0.55–1.02)
CREAT SERPL-MCNC: 2.13 MG/DL (ref 0.55–1.02)
CREAT SERPL-MCNC: 2.5 MG/DL (ref 0.55–1.02)
CREAT SERPL-MCNC: 2.51 MG/DL (ref 0.55–1.02)
CREAT SERPL-MCNC: 2.97 MG/DL (ref 0.55–1.02)
CREAT SERPL-MCNC: 3.02 MG/DL (ref 0.55–1.02)
CREAT SERPL-MCNC: 3.13 MG/DL (ref 0.55–1.02)
EOSINOPHIL # BLD AUTO: 0.04 X10(3)/MCL (ref 0–0.9)
EOSINOPHIL # BLD AUTO: 0.08 X10(3)/MCL (ref 0–0.9)
EOSINOPHIL # BLD AUTO: 0.08 X10(3)/MCL (ref 0–0.9)
EOSINOPHIL # BLD AUTO: 0.12 X10(3)/MCL (ref 0–0.9)
EOSINOPHIL # BLD AUTO: 0.17 X10(3)/MCL (ref 0–0.9)
EOSINOPHIL # BLD AUTO: 0.28 X10(3)/MCL (ref 0–0.9)
EOSINOPHIL # BLD AUTO: 0.35 X10(3)/MCL (ref 0–0.9)
EOSINOPHIL NFR BLD AUTO: 0.3 %
EOSINOPHIL NFR BLD AUTO: 1.1 %
EOSINOPHIL NFR BLD AUTO: 1.3 %
EOSINOPHIL NFR BLD AUTO: 1.9 %
EOSINOPHIL NFR BLD AUTO: 2.2 %
EOSINOPHIL NFR BLD AUTO: 4 %
EOSINOPHIL NFR BLD AUTO: 5.5 %
ERYTHROCYTE [DISTWIDTH] IN BLOOD BY AUTOMATED COUNT: 25.1 % (ref 11.5–17)
ERYTHROCYTE [DISTWIDTH] IN BLOOD BY AUTOMATED COUNT: ABNORMAL %
FLUAV AG UPPER RESP QL IA.RAPID: NOT DETECTED
FLUBV AG UPPER RESP QL IA.RAPID: NOT DETECTED
GFR SERPLBLD CREATININE-BSD FMLA CKD-EPI: 14 MLS/MIN/1.73/M2
GFR SERPLBLD CREATININE-BSD FMLA CKD-EPI: 14 MLS/MIN/1.73/M2
GFR SERPLBLD CREATININE-BSD FMLA CKD-EPI: 15 MLS/MIN/1.73/M2
GFR SERPLBLD CREATININE-BSD FMLA CKD-EPI: 18 MLS/MIN/1.73/M2
GFR SERPLBLD CREATININE-BSD FMLA CKD-EPI: 18 MLS/MIN/1.73/M2
GFR SERPLBLD CREATININE-BSD FMLA CKD-EPI: 22 MLS/MIN/1.73/M2
GFR SERPLBLD CREATININE-BSD FMLA CKD-EPI: 30 MLS/MIN/1.73/M2
GFR SERPLBLD CREATININE-BSD FMLA CKD-EPI: 42 MLS/MIN/1.73/M2
GFR SERPLBLD CREATININE-BSD FMLA CKD-EPI: 57 MLS/MIN/1.73/M2
GLOBULIN SER-MCNC: 2.7 GM/DL (ref 2.4–3.5)
GLOBULIN SER-MCNC: 2.7 GM/DL (ref 2.4–3.5)
GLOBULIN SER-MCNC: 2.9 GM/DL (ref 2.4–3.5)
GLOBULIN SER-MCNC: 3.2 GM/DL (ref 2.4–3.5)
GLOBULIN SER-MCNC: 3.2 GM/DL (ref 2.4–3.5)
GLOBULIN SER-MCNC: 3.6 GM/DL (ref 2.4–3.5)
GLOBULIN SER-MCNC: 3.9 GM/DL (ref 2.4–3.5)
GLOBULIN SER-MCNC: 4.4 GM/DL (ref 2.4–3.5)
GLOBULIN SER-MCNC: 4.5 GM/DL (ref 2.4–3.5)
GLUCOSE SERPL-MCNC: 100 MG/DL (ref 82–115)
GLUCOSE SERPL-MCNC: 105 MG/DL (ref 82–115)
GLUCOSE SERPL-MCNC: 109 MG/DL (ref 82–115)
GLUCOSE SERPL-MCNC: 144 MG/DL (ref 82–115)
GLUCOSE SERPL-MCNC: 81 MG/DL (ref 82–115)
GLUCOSE SERPL-MCNC: 82 MG/DL (ref 82–115)
GLUCOSE SERPL-MCNC: 86 MG/DL (ref 82–115)
GLUCOSE SERPL-MCNC: 95 MG/DL (ref 82–115)
GLUCOSE SERPL-MCNC: 97 MG/DL (ref 82–115)
GLUCOSE UR QL STRIP.AUTO: NEGATIVE MG/DL
GLUCOSE UR QL STRIP.AUTO: NEGATIVE MG/DL
HCT VFR BLD AUTO: 34.2 % (ref 37–47)
HCT VFR BLD AUTO: 35.9 % (ref 37–47)
HCT VFR BLD AUTO: 39.3 % (ref 37–47)
HCT VFR BLD AUTO: 39.5 % (ref 37–47)
HCT VFR BLD AUTO: 40.1 % (ref 37–47)
HCT VFR BLD AUTO: 44.7 % (ref 37–47)
HCT VFR BLD AUTO: 45.1 % (ref 37–47)
HGB BLD-MCNC: 10.2 GM/DL (ref 12–16)
HGB BLD-MCNC: 10.4 GM/DL (ref 12–16)
HGB BLD-MCNC: 11.5 GM/DL (ref 12–16)
HGB BLD-MCNC: 11.6 GM/DL (ref 12–16)
HGB BLD-MCNC: 12.1 GM/DL (ref 12–16)
HGB BLD-MCNC: 12.7 GM/DL (ref 12–16)
HGB BLD-MCNC: 13 GM/DL (ref 12–16)
HYPOCHROMIA BLD QL SMEAR: ABNORMAL
HYPOCHROMIA BLD QL SMEAR: ABNORMAL
IMM GRANULOCYTES # BLD AUTO: 0.01 X10(3)/MCL (ref 0–0.04)
IMM GRANULOCYTES # BLD AUTO: 0.02 X10(3)/MCL (ref 0–0.04)
IMM GRANULOCYTES # BLD AUTO: 0.02 X10(3)/MCL (ref 0–0.04)
IMM GRANULOCYTES # BLD AUTO: 0.03 X10(3)/MCL (ref 0–0.04)
IMM GRANULOCYTES NFR BLD AUTO: 0.1 %
IMM GRANULOCYTES NFR BLD AUTO: 0.3 %
IMM GRANULOCYTES NFR BLD AUTO: 0.4 %
IMM GRANULOCYTES NFR BLD AUTO: 0.4 %
IMM GRANULOCYTES NFR BLD AUTO: 0.5 %
INR BLD: 1.07 (ref 0–1.3)
INR BLD: 1.59 (ref 2–3)
KETONES UR QL STRIP.AUTO: NEGATIVE MG/DL
KETONES UR QL STRIP.AUTO: NEGATIVE MG/DL
LEUKOCYTE ESTERASE UR QL STRIP.AUTO: ABNORMAL UNIT/L
LEUKOCYTE ESTERASE UR QL STRIP.AUTO: NEGATIVE UNIT/L
LYMPHOCYTES # BLD AUTO: 0.89 X10(3)/MCL (ref 0.6–4.6)
LYMPHOCYTES # BLD AUTO: 0.98 X10(3)/MCL (ref 0.6–4.6)
LYMPHOCYTES # BLD AUTO: 1.07 X10(3)/MCL (ref 0.6–4.6)
LYMPHOCYTES # BLD AUTO: 1.13 X10(3)/MCL (ref 0.6–4.6)
LYMPHOCYTES # BLD AUTO: 1.13 X10(3)/MCL (ref 0.6–4.6)
LYMPHOCYTES # BLD AUTO: 1.33 X10(3)/MCL (ref 0.6–4.6)
LYMPHOCYTES # BLD AUTO: 1.51 X10(3)/MCL (ref 0.6–4.6)
LYMPHOCYTES NFR BLD AUTO: 12.7 %
LYMPHOCYTES NFR BLD AUTO: 14 %
LYMPHOCYTES NFR BLD AUTO: 14.1 %
LYMPHOCYTES NFR BLD AUTO: 14.8 %
LYMPHOCYTES NFR BLD AUTO: 17.5 %
LYMPHOCYTES NFR BLD AUTO: 17.7 %
LYMPHOCYTES NFR BLD AUTO: 17.7 %
MACROCYTES BLD QL SMEAR: ABNORMAL
MAGNESIUM SERPL-MCNC: 1.6 MG/DL (ref 1.6–2.6)
MAGNESIUM SERPL-MCNC: 1.8 MG/DL (ref 1.6–2.6)
MAGNESIUM SERPL-MCNC: 1.9 MG/DL (ref 1.6–2.6)
MAGNESIUM SERPL-MCNC: 2 MG/DL (ref 1.6–2.6)
MAGNESIUM SERPL-MCNC: 2.3 MG/DL (ref 1.6–2.6)
MAGNESIUM SERPL-MCNC: 2.5 MG/DL (ref 1.6–2.6)
MCH RBC QN AUTO: 22.8 PG
MCH RBC QN AUTO: 22.9 PG
MCH RBC QN AUTO: 23.4 PG
MCH RBC QN AUTO: 23.6 PG
MCH RBC QN AUTO: 24 PG
MCH RBC QN AUTO: 24.1 PG
MCH RBC QN AUTO: 25.7 PG
MCHC RBC AUTO-ENTMCNC: 28.4 MG/DL (ref 33–36)
MCHC RBC AUTO-ENTMCNC: 28.7 MG/DL (ref 33–36)
MCHC RBC AUTO-ENTMCNC: 28.8 MG/DL (ref 33–36)
MCHC RBC AUTO-ENTMCNC: 29 MG/DL (ref 33–36)
MCHC RBC AUTO-ENTMCNC: 29.4 MG/DL (ref 33–36)
MCHC RBC AUTO-ENTMCNC: 29.8 MG/DL (ref 33–36)
MCHC RBC AUTO-ENTMCNC: 30.8 MG/DL (ref 33–36)
MCV RBC AUTO: 79.3 FL (ref 80–94)
MCV RBC AUTO: 79.6 FL (ref 80–94)
MCV RBC AUTO: 80.5 FL (ref 80–94)
MCV RBC AUTO: 80.5 FL (ref 80–94)
MCV RBC AUTO: 81.6 FL (ref 80–94)
MCV RBC AUTO: 83.6 FL (ref 80–94)
MCV RBC AUTO: 84.1 FL (ref 80–94)
MONOCYTES # BLD AUTO: 0.32 X10(3)/MCL (ref 0.1–1.3)
MONOCYTES # BLD AUTO: 0.41 X10(3)/MCL (ref 0.1–1.3)
MONOCYTES # BLD AUTO: 0.43 X10(3)/MCL (ref 0.1–1.3)
MONOCYTES # BLD AUTO: 0.47 X10(3)/MCL (ref 0.1–1.3)
MONOCYTES # BLD AUTO: 0.51 X10(3)/MCL (ref 0.1–1.3)
MONOCYTES # BLD AUTO: 0.62 X10(3)/MCL (ref 0.1–1.3)
MONOCYTES # BLD AUTO: 0.62 X10(3)/MCL (ref 0.1–1.3)
MONOCYTES NFR BLD AUTO: 5 %
MONOCYTES NFR BLD AUTO: 5.2 %
MONOCYTES NFR BLD AUTO: 6.5 %
MONOCYTES NFR BLD AUTO: 6.5 %
MONOCYTES NFR BLD AUTO: 6.7 %
MONOCYTES NFR BLD AUTO: 7.3 %
MONOCYTES NFR BLD AUTO: 8.1 %
NEUTROPHILS # BLD AUTO: 4.41 X10(3)/MCL (ref 2.1–9.2)
NEUTROPHILS # BLD AUTO: 4.72 X10(3)/MCL (ref 2.1–9.2)
NEUTROPHILS # BLD AUTO: 4.86 X10(3)/MCL (ref 2.1–9.2)
NEUTROPHILS # BLD AUTO: 5.13 X10(3)/MCL (ref 2.1–9.2)
NEUTROPHILS # BLD AUTO: 5.4 X10(3)/MCL (ref 2.1–9.2)
NEUTROPHILS # BLD AUTO: 5.54 X10(3)/MCL (ref 2.1–9.2)
NEUTROPHILS # BLD AUTO: 9.65 X10(3)/MCL (ref 2.1–9.2)
NEUTROPHILS NFR BLD AUTO: 68.9 %
NEUTROPHILS NFR BLD AUTO: 71 %
NEUTROPHILS NFR BLD AUTO: 73.3 %
NEUTROPHILS NFR BLD AUTO: 74 %
NEUTROPHILS NFR BLD AUTO: 76.8 %
NEUTROPHILS NFR BLD AUTO: 76.8 %
NEUTROPHILS NFR BLD AUTO: 81 %
NITRITE UR QL STRIP.AUTO: NEGATIVE
NITRITE UR QL STRIP.AUTO: NEGATIVE
NRBC BLD AUTO-RTO: 0 %
PH UR STRIP.AUTO: 5.5 [PH]
PH UR STRIP.AUTO: 6 [PH]
PHOSPHATE SERPL-MCNC: 2 MG/DL (ref 2.3–4.7)
PHOSPHATE SERPL-MCNC: 3.3 MG/DL (ref 2.3–4.7)
PHOSPHATE SERPL-MCNC: 3.4 MG/DL (ref 2.3–4.7)
PHOSPHATE SERPL-MCNC: 3.9 MG/DL (ref 2.3–4.7)
PLATELET # BLD AUTO: 242 X10(3)/MCL (ref 130–400)
PLATELET # BLD AUTO: 253 X10(3)/MCL (ref 130–400)
PLATELET # BLD AUTO: 263 X10(3)/MCL (ref 130–400)
PLATELET # BLD AUTO: 304 X10(3)/MCL (ref 130–400)
PLATELET # BLD AUTO: 317 X10(3)/MCL (ref 130–400)
PLATELET # BLD AUTO: 401 X10(3)/MCL (ref 130–400)
PLATELET # BLD AUTO: 402 X10(3)/MCL (ref 130–400)
PLATELET # BLD EST: ABNORMAL 10*3/UL
PLATELET # BLD EST: ADEQUATE 10*3/UL
PLATELET # BLD EST: NORMAL 10*3/UL
PLATELETS.RETICULATED NFR BLD AUTO: 2.9 % (ref 0.9–11.2)
PMV BLD AUTO: 10.4 FL (ref 7.4–10.4)
PMV BLD AUTO: ABNORMAL FL
POCT GLUCOSE: 131 MG/DL (ref 70–110)
POCT GLUCOSE: 143 MG/DL (ref 70–110)
POCT GLUCOSE: 203 MG/DL (ref 70–110)
POIKILOCYTOSIS BLD QL SMEAR: ABNORMAL
POIKILOCYTOSIS BLD QL SMEAR: ABNORMAL
POLYCHROMASIA BLD QL SMEAR: ABNORMAL
POTASSIUM SERPL-SCNC: 3.6 MMOL/L (ref 3.5–5.1)
POTASSIUM SERPL-SCNC: 3.6 MMOL/L (ref 3.5–5.1)
POTASSIUM SERPL-SCNC: 4.1 MMOL/L (ref 3.5–5.1)
POTASSIUM SERPL-SCNC: 4.2 MMOL/L (ref 3.5–5.1)
POTASSIUM SERPL-SCNC: 4.8 MMOL/L (ref 3.5–5.1)
POTASSIUM SERPL-SCNC: 4.8 MMOL/L (ref 3.5–5.1)
POTASSIUM SERPL-SCNC: 5.2 MMOL/L (ref 3.5–5.1)
POTASSIUM SERPL-SCNC: 5.2 MMOL/L (ref 3.5–5.1)
POTASSIUM SERPL-SCNC: 6.3 MMOL/L (ref 3.5–5.1)
PROT SERPL-MCNC: 5.2 GM/DL (ref 5.8–7.6)
PROT SERPL-MCNC: 5.2 GM/DL (ref 5.8–7.6)
PROT SERPL-MCNC: 5.4 GM/DL (ref 5.8–7.6)
PROT SERPL-MCNC: 5.7 GM/DL (ref 5.8–7.6)
PROT SERPL-MCNC: 6.2 GM/DL (ref 5.8–7.6)
PROT SERPL-MCNC: 6.9 GM/DL (ref 5.8–7.6)
PROT SERPL-MCNC: 7.5 GM/DL (ref 5.8–7.6)
PROT SERPL-MCNC: 7.8 GM/DL (ref 5.8–7.6)
PROT SERPL-MCNC: 8.2 GM/DL (ref 5.8–7.6)
PROT UR QL STRIP.AUTO: NEGATIVE MG/DL
PROT UR QL STRIP.AUTO: NEGATIVE MG/DL
PROTHROMBIN TIME: 13.8 SECONDS (ref 12.5–14.5)
PROTHROMBIN TIME: 18.6 SECONDS (ref 11.7–14.5)
PTH-INTACT SERPL-MCNC: 60.9 PG/ML (ref 8.7–77)
PTH-INTACT SERPL-MCNC: 66.9 PG/ML (ref 8.7–77)
PTH-INTACT SERPL-MCNC: 83.5 PG/ML (ref 8.7–77)
RBC # BLD AUTO: 4.25 X10(6)/MCL (ref 4.2–5.4)
RBC # BLD AUTO: 4.4 X10(6)/MCL (ref 4.2–5.4)
RBC # BLD AUTO: 4.7 X10(6)/MCL (ref 4.2–5.4)
RBC # BLD AUTO: 4.77 X10(6)/MCL (ref 4.2–5.4)
RBC # BLD AUTO: 4.96 X10(6)/MCL (ref 4.2–5.4)
RBC # BLD AUTO: 5.55 X10(6)/MCL (ref 4.2–5.4)
RBC # BLD AUTO: 5.69 X10(6)/MCL (ref 4.2–5.4)
RBC #/AREA URNS AUTO: <5 /HPF
RBC MORPH BLD: ABNORMAL
RBC MORPH BLD: ABNORMAL
RBC MORPH BLD: NORMAL
RBC UR QL AUTO: NEGATIVE UNIT/L
RBC UR QL AUTO: NEGATIVE UNIT/L
SARS-COV-2 RNA RESP QL NAA+PROBE: NOT DETECTED
SODIUM SERPL-SCNC: 135 MMOL/L (ref 136–145)
SODIUM SERPL-SCNC: 135 MMOL/L (ref 136–145)
SODIUM SERPL-SCNC: 136 MMOL/L (ref 136–145)
SODIUM SERPL-SCNC: 137 MMOL/L (ref 136–145)
SODIUM SERPL-SCNC: 138 MMOL/L (ref 136–145)
SODIUM SERPL-SCNC: 139 MMOL/L (ref 136–145)
SODIUM SERPL-SCNC: 141 MMOL/L (ref 136–145)
SP GR UR STRIP.AUTO: 1.01
SP GR UR STRIP.AUTO: 1.01 (ref 1–1.03)
SQUAMOUS #/AREA URNS AUTO: <5 /HPF
TROPONIN I SERPL-MCNC: 0.04 NG/ML (ref 0–0.04)
TROPONIN I SERPL-MCNC: 0.05 NG/ML (ref 0–0.04)
TROPONIN I SERPL-MCNC: 0.07 NG/ML (ref 0–0.04)
UROBILINOGEN UR STRIP-ACNC: 0.2 MG/DL
UROBILINOGEN UR STRIP-ACNC: 0.2 MG/DL
WBC # SPEC AUTO: 11.9 X10(3)/MCL (ref 4.5–11.5)
WBC # SPEC AUTO: 6.3 X10(3)/MCL (ref 4.5–11.5)
WBC # SPEC AUTO: 6.4 X10(3)/MCL (ref 4.5–11.5)
WBC # SPEC AUTO: 6.4 X10(3)/MCL (ref 4.5–11.5)
WBC # SPEC AUTO: 7 X10(3)/MCL (ref 4.5–11.5)
WBC # SPEC AUTO: 7.2 X10(3)/MCL (ref 4.5–11.5)
WBC # SPEC AUTO: 7.6 X10(3)/MCL (ref 4.5–11.5)
WBC #/AREA URNS AUTO: 11 /HPF

## 2023-01-01 PROCEDURE — 96367 TX/PROPH/DG ADDL SEQ IV INF: CPT

## 2023-01-01 PROCEDURE — 63600175 PHARM REV CODE 636 W HCPCS: Performed by: STUDENT IN AN ORGANIZED HEALTH CARE EDUCATION/TRAINING PROGRAM

## 2023-01-01 PROCEDURE — 99232 SBSQ HOSP IP/OBS MODERATE 35: CPT | Mod: ,,, | Performed by: INTERNAL MEDICINE

## 2023-01-01 PROCEDURE — 96361 HYDRATE IV INFUSION ADD-ON: CPT

## 2023-01-01 PROCEDURE — 36415 COLL VENOUS BLD VENIPUNCTURE: CPT | Performed by: INTERNAL MEDICINE

## 2023-01-01 PROCEDURE — 84100 ASSAY OF PHOSPHORUS: CPT | Performed by: INTERNAL MEDICINE

## 2023-01-01 PROCEDURE — 97535 SELF CARE MNGMENT TRAINING: CPT

## 2023-01-01 PROCEDURE — 25000003 PHARM REV CODE 250: Performed by: INTERNAL MEDICINE

## 2023-01-01 PROCEDURE — 81003 URINALYSIS AUTO W/O SCOPE: CPT

## 2023-01-01 PROCEDURE — 25000003 PHARM REV CODE 250

## 2023-01-01 PROCEDURE — 99999 PR PBB SHADOW E&M-EST. PATIENT-LVL IV: CPT | Mod: PBBFAC,,,

## 2023-01-01 PROCEDURE — 93010 EKG 12-LEAD: ICD-10-PCS | Mod: ,,, | Performed by: STUDENT IN AN ORGANIZED HEALTH CARE EDUCATION/TRAINING PROGRAM

## 2023-01-01 PROCEDURE — 99232 PR SUBSEQUENT HOSPITAL CARE,LEVL II: ICD-10-PCS | Mod: ,,, | Performed by: INTERNAL MEDICINE

## 2023-01-01 PROCEDURE — 25000003 PHARM REV CODE 250: Performed by: STUDENT IN AN ORGANIZED HEALTH CARE EDUCATION/TRAINING PROGRAM

## 2023-01-01 PROCEDURE — 99232 SBSQ HOSP IP/OBS MODERATE 35: CPT | Mod: ,,,

## 2023-01-01 PROCEDURE — 85025 COMPLETE CBC W/AUTO DIFF WBC: CPT | Performed by: INTERNAL MEDICINE

## 2023-01-01 PROCEDURE — 63600175 PHARM REV CODE 636 W HCPCS

## 2023-01-01 PROCEDURE — 99291 CRITICAL CARE FIRST HOUR: CPT | Mod: 25

## 2023-01-01 PROCEDURE — 87088 URINE BACTERIA CULTURE: CPT | Performed by: NURSE PRACTITIONER

## 2023-01-01 PROCEDURE — 63600175 PHARM REV CODE 636 W HCPCS: Performed by: INTERNAL MEDICINE

## 2023-01-01 PROCEDURE — 96374 THER/PROPH/DIAG INJ IV PUSH: CPT

## 2023-01-01 PROCEDURE — 63600175 PHARM REV CODE 636 W HCPCS: Performed by: PHYSICIAN ASSISTANT

## 2023-01-01 PROCEDURE — 99222 PR INITIAL HOSPITAL CARE,LEVL II: ICD-10-PCS | Mod: ,,, | Performed by: INTERNAL MEDICINE

## 2023-01-01 PROCEDURE — 99215 PR OFFICE/OUTPT VISIT, EST, LEVL V, 40-54 MIN: ICD-10-PCS | Mod: ,,, | Performed by: INTERNAL MEDICINE

## 2023-01-01 PROCEDURE — 99215 OFFICE O/P EST HI 40 MIN: CPT | Mod: ,,, | Performed by: INTERNAL MEDICINE

## 2023-01-01 PROCEDURE — 99233 SBSQ HOSP IP/OBS HIGH 50: CPT | Mod: ,,, | Performed by: INTERNAL MEDICINE

## 2023-01-01 PROCEDURE — 97116 GAIT TRAINING THERAPY: CPT | Mod: CQ

## 2023-01-01 PROCEDURE — 97110 THERAPEUTIC EXERCISES: CPT | Mod: CQ

## 2023-01-01 PROCEDURE — 85025 COMPLETE CBC W/AUTO DIFF WBC: CPT

## 2023-01-01 PROCEDURE — 85025 COMPLETE CBC W/AUTO DIFF WBC: CPT | Performed by: STUDENT IN AN ORGANIZED HEALTH CARE EDUCATION/TRAINING PROGRAM

## 2023-01-01 PROCEDURE — 93010 ELECTROCARDIOGRAM REPORT: CPT | Mod: ,,, | Performed by: INTERNAL MEDICINE

## 2023-01-01 PROCEDURE — 11000001 HC ACUTE MED/SURG PRIVATE ROOM

## 2023-01-01 PROCEDURE — 99214 PR OFFICE/OUTPT VISIT, EST, LEVL IV, 30-39 MIN: ICD-10-PCS | Mod: S$PBB,,,

## 2023-01-01 PROCEDURE — 99238 HOSP IP/OBS DSCHRG MGMT 30/<: CPT | Mod: ,,, | Performed by: INTERNAL MEDICINE

## 2023-01-01 PROCEDURE — 83735 ASSAY OF MAGNESIUM: CPT | Performed by: INTERNAL MEDICINE

## 2023-01-01 PROCEDURE — 80053 COMPREHEN METABOLIC PANEL: CPT | Performed by: STUDENT IN AN ORGANIZED HEALTH CARE EDUCATION/TRAINING PROGRAM

## 2023-01-01 PROCEDURE — 80053 COMPREHEN METABOLIC PANEL: CPT | Performed by: INTERNAL MEDICINE

## 2023-01-01 PROCEDURE — 99233 SBSQ HOSP IP/OBS HIGH 50: CPT | Mod: 95,,, | Performed by: INTERNAL MEDICINE

## 2023-01-01 PROCEDURE — 97162 PT EVAL MOD COMPLEX 30 MIN: CPT

## 2023-01-01 PROCEDURE — 99233 PR SUBSEQUENT HOSPITAL CARE,LEVL III: ICD-10-PCS | Mod: 95,,, | Performed by: INTERNAL MEDICINE

## 2023-01-01 PROCEDURE — 85610 PROTHROMBIN TIME: CPT | Performed by: NURSE PRACTITIONER

## 2023-01-01 PROCEDURE — 97116 GAIT TRAINING THERAPY: CPT

## 2023-01-01 PROCEDURE — 93005 ELECTROCARDIOGRAM TRACING: CPT

## 2023-01-01 PROCEDURE — 93010 EKG 12-LEAD: ICD-10-PCS | Mod: ,,, | Performed by: INTERNAL MEDICINE

## 2023-01-01 PROCEDURE — 99999 PR PBB SHADOW E&M-EST. PATIENT-LVL IV: ICD-10-PCS | Mod: PBBFAC,,,

## 2023-01-01 PROCEDURE — 99222 1ST HOSP IP/OBS MODERATE 55: CPT | Mod: ,,, | Performed by: INTERNAL MEDICINE

## 2023-01-01 PROCEDURE — 96375 TX/PRO/DX INJ NEW DRUG ADDON: CPT

## 2023-01-01 PROCEDURE — 99233 PR SUBSEQUENT HOSPITAL CARE,LEVL III: ICD-10-PCS | Mod: ,,, | Performed by: INTERNAL MEDICINE

## 2023-01-01 PROCEDURE — 83970 ASSAY OF PARATHORMONE: CPT | Performed by: INTERNAL MEDICINE

## 2023-01-01 PROCEDURE — 93010 ELECTROCARDIOGRAM REPORT: CPT | Mod: ,,, | Performed by: STUDENT IN AN ORGANIZED HEALTH CARE EDUCATION/TRAINING PROGRAM

## 2023-01-01 PROCEDURE — 99214 OFFICE O/P EST MOD 30 MIN: CPT | Mod: ,,,

## 2023-01-01 PROCEDURE — 99223 1ST HOSP IP/OBS HIGH 75: CPT | Mod: ,,, | Performed by: INTERNAL MEDICINE

## 2023-01-01 PROCEDURE — 99214 PR OFFICE/OUTPT VISIT, EST, LEVL IV, 30-39 MIN: ICD-10-PCS | Mod: ,,,

## 2023-01-01 PROCEDURE — 82962 GLUCOSE BLOOD TEST: CPT

## 2023-01-01 PROCEDURE — 85610 PROTHROMBIN TIME: CPT

## 2023-01-01 PROCEDURE — 84484 ASSAY OF TROPONIN QUANT: CPT | Performed by: STUDENT IN AN ORGANIZED HEALTH CARE EDUCATION/TRAINING PROGRAM

## 2023-01-01 PROCEDURE — 99214 OFFICE O/P EST MOD 30 MIN: CPT | Mod: PBBFAC

## 2023-01-01 PROCEDURE — 99232 PR SUBSEQUENT HOSPITAL CARE,LEVL II: ICD-10-PCS | Mod: ,,,

## 2023-01-01 PROCEDURE — 85025 COMPLETE CBC W/AUTO DIFF WBC: CPT | Performed by: NURSE PRACTITIONER

## 2023-01-01 PROCEDURE — 84484 ASSAY OF TROPONIN QUANT: CPT | Performed by: NURSE PRACTITIONER

## 2023-01-01 PROCEDURE — 80053 COMPREHEN METABOLIC PANEL: CPT

## 2023-01-01 PROCEDURE — 85730 THROMBOPLASTIN TIME PARTIAL: CPT

## 2023-01-01 PROCEDURE — 85730 THROMBOPLASTIN TIME PARTIAL: CPT | Performed by: NURSE PRACTITIONER

## 2023-01-01 PROCEDURE — 99238 PR HOSPITAL DISCHARGE DAY,<30 MIN: ICD-10-PCS | Mod: ,,, | Performed by: INTERNAL MEDICINE

## 2023-01-01 PROCEDURE — 0240U COVID/FLU A&B PCR: CPT

## 2023-01-01 PROCEDURE — 97166 OT EVAL MOD COMPLEX 45 MIN: CPT

## 2023-01-01 PROCEDURE — 87186 SC STD MICRODIL/AGAR DIL: CPT | Performed by: NURSE PRACTITIONER

## 2023-01-01 PROCEDURE — 99214 OFFICE O/P EST MOD 30 MIN: CPT | Mod: S$PBB,,,

## 2023-01-01 PROCEDURE — 96365 THER/PROPH/DIAG IV INF INIT: CPT

## 2023-01-01 PROCEDURE — 99223 PR INITIAL HOSPITAL CARE,LEVL III: ICD-10-PCS | Mod: ,,, | Performed by: INTERNAL MEDICINE

## 2023-01-01 PROCEDURE — 80053 COMPREHEN METABOLIC PANEL: CPT | Performed by: NURSE PRACTITIONER

## 2023-01-01 PROCEDURE — 63600175 PHARM REV CODE 636 W HCPCS: Mod: JG

## 2023-01-01 PROCEDURE — 81001 URINALYSIS AUTO W/SCOPE: CPT | Performed by: NURSE PRACTITIONER

## 2023-01-01 PROCEDURE — 83735 ASSAY OF MAGNESIUM: CPT | Performed by: NURSE PRACTITIONER

## 2023-01-01 PROCEDURE — 97110 THERAPEUTIC EXERCISES: CPT

## 2023-01-01 PROCEDURE — 99285 EMERGENCY DEPT VISIT HI MDM: CPT | Mod: 25

## 2023-01-01 RX ORDER — ONDANSETRON 2 MG/ML
8 INJECTION INTRAMUSCULAR; INTRAVENOUS EVERY 6 HOURS PRN
Status: DISCONTINUED | OUTPATIENT
Start: 2023-01-01 | End: 2023-01-01 | Stop reason: HOSPADM

## 2023-01-01 RX ORDER — SODIUM CHLORIDE, SODIUM LACTATE, POTASSIUM CHLORIDE, CALCIUM CHLORIDE 600; 310; 30; 20 MG/100ML; MG/100ML; MG/100ML; MG/100ML
1000 INJECTION, SOLUTION INTRAVENOUS CONTINUOUS
Status: DISCONTINUED | OUTPATIENT
Start: 2023-01-01 | End: 2023-01-01

## 2023-01-01 RX ORDER — CLOPIDOGREL BISULFATE 75 MG/1
75 TABLET ORAL DAILY
Status: DISCONTINUED | OUTPATIENT
Start: 2023-01-01 | End: 2023-01-01 | Stop reason: HOSPADM

## 2023-01-01 RX ORDER — LEVOTHYROXINE SODIUM 50 UG/1
50 TABLET ORAL
Status: DISCONTINUED | OUTPATIENT
Start: 2023-01-01 | End: 2023-01-01

## 2023-01-01 RX ORDER — MORPHINE SULFATE 4 MG/ML
4 INJECTION, SOLUTION INTRAMUSCULAR; INTRAVENOUS
Status: DISCONTINUED | OUTPATIENT
Start: 2023-01-01 | End: 2023-01-01

## 2023-01-01 RX ORDER — POTASSIUM CHLORIDE 20 MEQ/1
TABLET, EXTENDED RELEASE ORAL
Qty: 90 TABLET | Refills: 0 | Status: SHIPPED | OUTPATIENT
Start: 2023-01-01

## 2023-01-01 RX ORDER — ONDANSETRON 4 MG/1
4 TABLET, FILM COATED ORAL EVERY 6 HOURS PRN
Qty: 24 TABLET | Refills: 0 | Status: SHIPPED | OUTPATIENT
Start: 2023-01-01

## 2023-01-01 RX ORDER — ESCITALOPRAM OXALATE 10 MG/1
10 TABLET ORAL DAILY
Qty: 30 TABLET | Refills: 6 | Status: SHIPPED | OUTPATIENT
Start: 2023-01-01 | End: 2024-01-18

## 2023-01-01 RX ORDER — ESCITALOPRAM OXALATE 10 MG/1
10 TABLET ORAL DAILY
Qty: 30 TABLET | Refills: 6 | Status: SHIPPED | OUTPATIENT
Start: 2023-01-01 | End: 2023-01-01 | Stop reason: SDUPTHER

## 2023-01-01 RX ORDER — PANTOPRAZOLE SODIUM 40 MG/1
40 TABLET, DELAYED RELEASE ORAL 2 TIMES DAILY
COMMUNITY

## 2023-01-01 RX ORDER — SODIUM CHLORIDE AND POTASSIUM CHLORIDE 150; 900 MG/100ML; MG/100ML
INJECTION, SOLUTION INTRAVENOUS CONTINUOUS
Status: DISCONTINUED | OUTPATIENT
Start: 2023-01-01 | End: 2023-01-01 | Stop reason: HOSPADM

## 2023-01-01 RX ORDER — ACETAMINOPHEN AND CODEINE PHOSPHATE 300; 30 MG/1; MG/1
1 TABLET ORAL EVERY 6 HOURS PRN
Qty: 20 TABLET | Refills: 0 | Status: SHIPPED | OUTPATIENT
Start: 2023-01-01 | End: 2023-01-01

## 2023-01-01 RX ORDER — DOCUSATE SODIUM 100 MG/1
100 CAPSULE, LIQUID FILLED ORAL 2 TIMES DAILY
Status: DISCONTINUED | OUTPATIENT
Start: 2023-01-01 | End: 2023-01-01 | Stop reason: HOSPADM

## 2023-01-01 RX ORDER — TORSEMIDE 20 MG/1
20 TABLET ORAL DAILY
Status: DISCONTINUED | OUTPATIENT
Start: 2023-01-01 | End: 2023-01-01 | Stop reason: HOSPADM

## 2023-01-01 RX ORDER — LORAZEPAM 2 MG/ML
2 INJECTION INTRAMUSCULAR
Status: DISCONTINUED | OUTPATIENT
Start: 2023-01-01 | End: 2023-01-01 | Stop reason: HOSPADM

## 2023-01-01 RX ORDER — ONDANSETRON 2 MG/ML
4 INJECTION INTRAMUSCULAR; INTRAVENOUS
Status: COMPLETED | OUTPATIENT
Start: 2023-01-01 | End: 2023-01-01

## 2023-01-01 RX ORDER — LEVOTHYROXINE SODIUM 50 UG/1
50 TABLET ORAL
Status: DISCONTINUED | OUTPATIENT
Start: 2023-01-01 | End: 2023-01-01 | Stop reason: HOSPADM

## 2023-01-01 RX ORDER — ATORVASTATIN CALCIUM 40 MG/1
40 TABLET, FILM COATED ORAL DAILY
Status: DISCONTINUED | OUTPATIENT
Start: 2023-01-01 | End: 2023-01-01 | Stop reason: HOSPADM

## 2023-01-01 RX ORDER — MORPHINE SULFATE 4 MG/ML
2 INJECTION, SOLUTION INTRAMUSCULAR; INTRAVENOUS
Status: COMPLETED | OUTPATIENT
Start: 2023-01-01 | End: 2023-01-01

## 2023-01-01 RX ORDER — ESCITALOPRAM OXALATE 10 MG/1
10 TABLET ORAL DAILY
Status: DISCONTINUED | OUTPATIENT
Start: 2023-01-01 | End: 2023-01-01

## 2023-01-01 RX ORDER — ENOXAPARIN SODIUM 100 MG/ML
1 INJECTION SUBCUTANEOUS
Status: DISCONTINUED | OUTPATIENT
Start: 2023-01-01 | End: 2023-01-01 | Stop reason: HOSPADM

## 2023-01-01 RX ORDER — SODIUM CHLORIDE 0.9 % (FLUSH) 0.9 %
10 SYRINGE (ML) INJECTION
Status: DISCONTINUED | OUTPATIENT
Start: 2023-01-01 | End: 2023-01-01 | Stop reason: HOSPADM

## 2023-01-01 RX ORDER — LEVETIRACETAM 10 MG/ML
1000 INJECTION INTRAVASCULAR
Status: COMPLETED | OUTPATIENT
Start: 2023-01-01 | End: 2023-01-01

## 2023-01-01 RX ORDER — MORPHINE SULFATE 4 MG/ML
4 INJECTION, SOLUTION INTRAMUSCULAR; INTRAVENOUS
Status: DISCONTINUED | OUTPATIENT
Start: 2023-01-01 | End: 2023-01-01 | Stop reason: HOSPADM

## 2023-01-01 RX ORDER — PANTOPRAZOLE SODIUM 40 MG/10ML
40 INJECTION, POWDER, LYOPHILIZED, FOR SOLUTION INTRAVENOUS DAILY
Status: DISCONTINUED | OUTPATIENT
Start: 2023-01-01 | End: 2023-01-01

## 2023-01-01 RX ORDER — SODIUM CHLORIDE 9 MG/ML
INJECTION, SOLUTION INTRAVENOUS CONTINUOUS
Status: DISCONTINUED | OUTPATIENT
Start: 2023-01-01 | End: 2023-01-01

## 2023-01-01 RX ORDER — LEVETIRACETAM 10 MG/ML
1000 INJECTION INTRAVASCULAR
Status: DISCONTINUED | OUTPATIENT
Start: 2023-01-01 | End: 2023-01-01

## 2023-01-01 RX ORDER — LORAZEPAM 2 MG/ML
2 INJECTION INTRAMUSCULAR
Status: COMPLETED | OUTPATIENT
Start: 2023-01-01 | End: 2023-01-01

## 2023-01-01 RX ORDER — LEVETIRACETAM 10 MG/ML
INJECTION INTRAVASCULAR
Status: COMPLETED
Start: 2023-01-01 | End: 2023-01-01

## 2023-01-01 RX ORDER — ACETAMINOPHEN 325 MG/1
650 TABLET ORAL EVERY 6 HOURS PRN
Status: DISCONTINUED | OUTPATIENT
Start: 2023-01-01 | End: 2023-01-01 | Stop reason: HOSPADM

## 2023-01-01 RX ORDER — SUCRALFATE 1 G/1
1 TABLET ORAL 3 TIMES DAILY
COMMUNITY

## 2023-01-01 RX ORDER — LORAZEPAM 2 MG/ML
INJECTION INTRAMUSCULAR
Status: COMPLETED
Start: 2023-01-01 | End: 2023-01-01

## 2023-01-01 RX ORDER — LORAZEPAM 2 MG/ML
1 INJECTION INTRAMUSCULAR
Status: COMPLETED | OUTPATIENT
Start: 2023-01-01 | End: 2023-01-01

## 2023-01-01 RX ORDER — ACETAMINOPHEN 325 MG/1
650 TABLET ORAL
Status: COMPLETED | OUTPATIENT
Start: 2023-01-01 | End: 2023-01-01

## 2023-01-01 RX ORDER — RANOLAZINE 500 MG/1
500 TABLET, EXTENDED RELEASE ORAL 2 TIMES DAILY
Status: DISCONTINUED | OUTPATIENT
Start: 2023-01-01 | End: 2023-01-01 | Stop reason: HOSPADM

## 2023-01-01 RX ORDER — ONDANSETRON 2 MG/ML
4 INJECTION INTRAMUSCULAR; INTRAVENOUS EVERY 6 HOURS PRN
Status: DISCONTINUED | OUTPATIENT
Start: 2023-01-01 | End: 2023-01-01

## 2023-01-01 RX ORDER — TALC
6 POWDER (GRAM) TOPICAL NIGHTLY PRN
Status: DISCONTINUED | OUTPATIENT
Start: 2023-01-01 | End: 2023-01-01

## 2023-01-01 RX ORDER — PANTOPRAZOLE SODIUM 40 MG/1
40 TABLET, DELAYED RELEASE ORAL 2 TIMES DAILY
Status: DISCONTINUED | OUTPATIENT
Start: 2023-01-01 | End: 2023-01-01 | Stop reason: HOSPADM

## 2023-01-01 RX ORDER — TALC
6 POWDER (GRAM) TOPICAL NIGHTLY PRN
Status: DISCONTINUED | OUTPATIENT
Start: 2023-01-01 | End: 2023-01-01 | Stop reason: HOSPADM

## 2023-01-01 RX ORDER — ONDANSETRON 2 MG/ML
4 INJECTION INTRAMUSCULAR; INTRAVENOUS EVERY 4 HOURS PRN
Status: DISCONTINUED | OUTPATIENT
Start: 2023-01-01 | End: 2023-01-01 | Stop reason: HOSPADM

## 2023-01-01 RX ORDER — ESCITALOPRAM OXALATE 10 MG/1
10 TABLET ORAL DAILY
Status: DISCONTINUED | OUTPATIENT
Start: 2023-01-01 | End: 2023-01-01 | Stop reason: HOSPADM

## 2023-01-01 RX ORDER — LORAZEPAM 2 MG/ML
INJECTION INTRAMUSCULAR
Status: DISCONTINUED
Start: 2023-01-01 | End: 2023-01-01 | Stop reason: HOSPADM

## 2023-01-01 RX ORDER — CALCIUM GLUCONATE 20 MG/ML
1 INJECTION, SOLUTION INTRAVENOUS
Status: COMPLETED | OUTPATIENT
Start: 2023-01-01 | End: 2023-01-01

## 2023-01-01 RX ADMIN — DOCUSATE SODIUM 100 MG: 100 CAPSULE, LIQUID FILLED ORAL at 11:01

## 2023-01-01 RX ADMIN — Medication 6 MG: at 09:01

## 2023-01-01 RX ADMIN — RANOLAZINE 500 MG: 500 TABLET, FILM COATED, EXTENDED RELEASE ORAL at 09:01

## 2023-01-01 RX ADMIN — CEFTRIAXONE 1 G: 1 INJECTION, POWDER, FOR SOLUTION INTRAMUSCULAR; INTRAVENOUS at 09:01

## 2023-01-01 RX ADMIN — ENOXAPARIN SODIUM 40 MG: 40 INJECTION SUBCUTANEOUS at 09:01

## 2023-01-01 RX ADMIN — SODIUM CHLORIDE, POTASSIUM CHLORIDE, SODIUM LACTATE AND CALCIUM CHLORIDE 1000 ML: 600; 310; 30; 20 INJECTION, SOLUTION INTRAVENOUS at 06:02

## 2023-01-01 RX ADMIN — MORPHINE SULFATE 2 MG: 4 INJECTION INTRAVENOUS at 08:01

## 2023-01-01 RX ADMIN — LEVETIRACETAM: 10 INJECTION INTRAVASCULAR at 06:02

## 2023-01-01 RX ADMIN — ESCITALOPRAM OXALATE 10 MG: 10 TABLET ORAL at 09:01

## 2023-01-01 RX ADMIN — CLOPIDOGREL BISULFATE 75 MG: 75 TABLET ORAL at 09:01

## 2023-01-01 RX ADMIN — CEFTRIAXONE 1 G: 1 INJECTION, POWDER, FOR SOLUTION INTRAMUSCULAR; INTRAVENOUS at 08:01

## 2023-01-01 RX ADMIN — DOCUSATE SODIUM 100 MG: 100 CAPSULE, LIQUID FILLED ORAL at 09:01

## 2023-01-01 RX ADMIN — LEVOTHYROXINE SODIUM 50 MCG: 50 TABLET ORAL at 05:01

## 2023-01-01 RX ADMIN — ONDANSETRON 4 MG: 2 INJECTION INTRAMUSCULAR; INTRAVENOUS at 08:01

## 2023-01-01 RX ADMIN — LEVETIRACETAM: 10 INJECTION INTRAVENOUS at 06:02

## 2023-01-01 RX ADMIN — ENOXAPARIN SODIUM 40 MG: 40 INJECTION SUBCUTANEOUS at 11:01

## 2023-01-01 RX ADMIN — PROTHROMBIN, COAGULATION FACTOR VII HUMAN, COAGULATION FACTOR IX HUMAN, COAGULATION FACTOR X HUMAN, PROTEIN C, PROTEIN S HUMAN, AND WATER 1000 UNITS: KIT at 04:02

## 2023-01-01 RX ADMIN — ATORVASTATIN CALCIUM 40 MG: 40 TABLET, FILM COATED ORAL at 09:01

## 2023-01-01 RX ADMIN — PANTOPRAZOLE SODIUM 40 MG: 40 TABLET, DELAYED RELEASE ORAL at 08:01

## 2023-01-01 RX ADMIN — ACETAMINOPHEN 650 MG: 325 TABLET, FILM COATED ORAL at 09:01

## 2023-01-01 RX ADMIN — SODIUM CHLORIDE: 9 INJECTION, SOLUTION INTRAVENOUS at 11:01

## 2023-01-01 RX ADMIN — ESCITALOPRAM OXALATE 10 MG: 10 TABLET ORAL at 08:01

## 2023-01-01 RX ADMIN — PANTOPRAZOLE SODIUM 40 MG: 40 TABLET, DELAYED RELEASE ORAL at 09:01

## 2023-01-01 RX ADMIN — CLOPIDOGREL BISULFATE 75 MG: 75 TABLET ORAL at 11:01

## 2023-01-01 RX ADMIN — RANOLAZINE 500 MG: 500 TABLET, FILM COATED, EXTENDED RELEASE ORAL at 08:01

## 2023-01-01 RX ADMIN — POTASSIUM CHLORIDE AND SODIUM CHLORIDE: 900; 150 INJECTION, SOLUTION INTRAVENOUS at 08:02

## 2023-01-01 RX ADMIN — DOCUSATE SODIUM 100 MG: 100 CAPSULE, LIQUID FILLED ORAL at 08:01

## 2023-01-01 RX ADMIN — ATORVASTATIN CALCIUM 40 MG: 40 TABLET, FILM COATED ORAL at 08:01

## 2023-01-01 RX ADMIN — LORAZEPAM 1 MG: 2 INJECTION INTRAMUSCULAR at 06:02

## 2023-01-01 RX ADMIN — ATORVASTATIN CALCIUM 40 MG: 40 TABLET, FILM COATED ORAL at 11:01

## 2023-01-01 RX ADMIN — ONDANSETRON 4 MG: 2 INJECTION INTRAMUSCULAR; INTRAVENOUS at 10:01

## 2023-01-01 RX ADMIN — LORAZEPAM 1 MG: 2 INJECTION INTRAMUSCULAR; INTRAVENOUS at 06:02

## 2023-01-01 RX ADMIN — PANTOPRAZOLE SODIUM 40 MG: 40 TABLET, DELAYED RELEASE ORAL at 11:01

## 2023-01-01 RX ADMIN — Medication 6 MG: at 08:01

## 2023-01-01 RX ADMIN — SODIUM CHLORIDE: 9 INJECTION, SOLUTION INTRAVENOUS at 05:01

## 2023-01-01 RX ADMIN — INSULIN HUMAN 5 UNITS: 100 INJECTION, SOLUTION PARENTERAL at 06:01

## 2023-01-01 RX ADMIN — LORAZEPAM 2 MG: 2 INJECTION INTRAMUSCULAR; INTRAVENOUS at 06:02

## 2023-01-01 RX ADMIN — SODIUM CHLORIDE: 9 INJECTION, SOLUTION INTRAVENOUS at 02:01

## 2023-01-01 RX ADMIN — CALCIUM GLUCONATE 1 G: 20 INJECTION, SOLUTION INTRAVENOUS at 06:01

## 2023-01-01 RX ADMIN — ONDANSETRON 4 MG: 2 INJECTION INTRAMUSCULAR; INTRAVENOUS at 06:01

## 2023-01-01 RX ADMIN — SODIUM CHLORIDE, POTASSIUM CHLORIDE, SODIUM LACTATE AND CALCIUM CHLORIDE 1000 ML: 600; 310; 30; 20 INJECTION, SOLUTION INTRAVENOUS at 06:01

## 2023-01-01 RX ADMIN — CLOPIDOGREL BISULFATE 75 MG: 75 TABLET ORAL at 08:01

## 2023-01-01 RX ADMIN — RANOLAZINE 500 MG: 500 TABLET, FILM COATED, EXTENDED RELEASE ORAL at 11:01

## 2023-01-01 RX ADMIN — LEVOTHYROXINE SODIUM 50 MCG: 50 TABLET ORAL at 06:01

## 2023-01-01 RX ADMIN — SODIUM ZIRCONIUM CYCLOSILICATE 10 G: 10 POWDER, FOR SUSPENSION ORAL at 06:01

## 2023-01-01 RX ADMIN — ESCITALOPRAM OXALATE 10 MG: 10 TABLET ORAL at 11:01

## 2023-01-01 RX ADMIN — ACETAMINOPHEN 650 MG: 325 TABLET ORAL at 02:02

## 2023-01-01 RX ADMIN — ACETAMINOPHEN 650 MG: 325 TABLET, FILM COATED ORAL at 04:01

## 2023-01-01 RX ADMIN — TORSEMIDE 20 MG: 20 TABLET ORAL at 09:01

## 2023-01-01 RX ADMIN — SODIUM CHLORIDE: 9 INJECTION, SOLUTION INTRAVENOUS at 08:01

## 2023-01-01 RX ADMIN — DEXTROSE MONOHYDRATE 250 ML: 100 INJECTION, SOLUTION INTRAVENOUS at 06:01

## 2023-01-06 NOTE — TELEPHONE ENCOUNTER
----- Message from Susanne Pack MA sent at 12/29/2022  8:08 AM CST -----  Regarding: Dr BROOK HERRERA Tuesday 1-10-23       1. Are there any outstanding Labs, imaging or referrals in the patient's chart?        4 month follow up    Non fasting labs ordered and ready to do    Last wellness  9-6-22         2. . Has the patient been seen in an ER, urgent care clinic, or any other health care    provider since their last visit? If yes when and where?

## 2023-01-09 NOTE — TELEPHONE ENCOUNTER
[facility] requesting depression medication for Ms. Price. Sent Lexapro 10 mg daily to the pharmacy, faxing instructions to [facility].

## 2023-01-10 NOTE — PROGRESS NOTES
"    Patient ID: Dee Haji is a 88 y.o. female.    Chief Complaint: Follow-up (4 month ) and Fatigue    Dee Haji is a 88 y.o. female who  has a past medical history of Combined systolic and diastolic heart failure, A-fib, CAD (coronary artery disease), CVA (cerebrovascular accident), Dyspnea on exertion, GERD (gastroesophageal reflux disease), HTN (hypertension), Mixed hyperlipidemia, Renal disorder, S/P AVR (aortic valve replacement), Synovial cyst of knee, TIA (transient ischemic attack), Unspecified hemorrhoids, and Unspecified osteoarthritis, unspecified site.  Patient previously seen 12/19/22 for hospital discharge for fluid overload.  At the time, noted to have been discharged on torsemide and had her Diamox discontinued.    Today presents sitting in wheelchair accompanied by her  with complaints worsening fatigue/ weakness.  Onset within the last 7 days.  Has been suffering from some bouts of nausea with inability to drink much fluids.  However, now feels "too weak to raise glass of water to drink".  Blood pressure in office 88/60 with repeat manual 83/61.  Recently completed blood work with noted BUN 80.9 (01/09/23) worsened from 57 (12/16/22),  creatinine 2.97 worsened from 1.67.  Denies any wheezing, cough, lower extremity swelling, orthopnea, chest pain, palpitations, or syncope.  Discussion had with patient/ regarding recommendation for ER eval for hypotension with worsening weakness, fatigue, and RNEÉ.  Otherwise no other acute medical concerns noted today.    MCW:9/06/22  Cardiologist: Dr. Payton   GI: Dr. Nunez  Renal: Dr. Almeida      MEDICAL HISTORY:    Past Medical History:   Diagnosis Date    A-fib     CAD (coronary artery disease)     CVA (cerebrovascular accident)     Dyspnea on exertion     GERD (gastroesophageal reflux disease)     HTN (hypertension)     Hypothyroidism, unspecified     Mixed hyperlipidemia     Renal disorder     S/P AVR (aortic valve replacement) "     Synovial cyst of knee     TIA (transient ischemic attack)     Unspecified hemorrhoids     Unspecified osteoarthritis, unspecified site       Past Surgical History:   Procedure Laterality Date    ANGIOGRAM, CORONARY, WITH LEFT HEART CATHETERIZATION      BUNIONECTOMY Right     CATARACT EXTRACTION Right     CHOLECYSTECTOMY      CORONARY STENT PLACEMENT      ESOPHAGOGASTRODUODENOSCOPY N/A 2022    Procedure: EGD W/DIL;  Surgeon: CIARA Nunez MD;  Location: [facility] ENDOSCOPY;  Service: Gastroenterology;  Laterality: N/A;  48FR  Garcia    left lower lobectomy Left       Social History     Tobacco Use    Smoking status: Former     Types: Cigarettes     Quit date: 1/3/1972     Years since quittin.0    Smokeless tobacco: Never    Tobacco comments:     quit in    Substance Use Topics    Alcohol use: Not Currently    Drug use: Not Currently          Health Maintenance Due   Topic Date Due    Influenza Vaccine (1) 2022          Patient Care Team:  Tierra Johnson MD as PCP - General (Internal Medicine)      Review of Systems   Constitutional:  Positive for fatigue. Negative for fever.   HENT:  Negative for congestion, rhinorrhea, sore throat and trouble swallowing.    Eyes:  Negative for redness and visual disturbance.   Respiratory:  Negative for cough, chest tightness and shortness of breath.    Cardiovascular:  Negative for chest pain and palpitations.   Gastrointestinal:  Negative for abdominal pain, constipation, diarrhea, nausea and vomiting.   Genitourinary:  Negative for dysuria, flank pain, frequency and urgency.   Musculoskeletal:  Negative for arthralgias, gait problem and myalgias.   Skin:  Negative for rash and wound.   Neurological:  Positive for weakness. Negative for facial asymmetry, speech difficulty and headaches.   All other systems reviewed and are negative.    Objective:   BP (!) 83/61   Pulse (P) 84   Temp (!) (P) 84.6 °F (29.2 °C) (Temporal)   Ht (P) 5' (1.524  m)   Wt (P) 38.4 kg (84 lb 9.6 oz)   LMP  (LMP Unknown)   SpO2 (P) 95%   BMI (P) 16.52 kg/m²      Physical Exam  Constitutional:       General: She is not in acute distress.     Appearance: Normal appearance. She is ill-appearing.   HENT:      Right Ear: Tympanic membrane, ear canal and external ear normal.      Left Ear: Tympanic membrane, ear canal and external ear normal.      Nose: Nose normal.      Mouth/Throat:      Mouth: Mucous membranes are moist.      Pharynx: Oropharynx is clear.   Eyes:      Extraocular Movements: Extraocular movements intact.      Conjunctiva/sclera: Conjunctivae normal.      Pupils: Pupils are equal, round, and reactive to light.   Cardiovascular:      Rate and Rhythm: Normal rate and regular rhythm.      Pulses: Normal pulses.      Heart sounds: Normal heart sounds. No murmur heard.    No gallop.   Pulmonary:      Effort: Pulmonary effort is normal.      Breath sounds: Normal breath sounds. No wheezing.   Abdominal:      General: Bowel sounds are normal. There is no distension.      Palpations: Abdomen is soft. There is no mass.      Tenderness: There is no abdominal tenderness. There is no guarding.   Musculoskeletal:         General: Normal range of motion.   Skin:     General: Skin is warm and dry.   Neurological:      Mental Status: She is alert. Mental status is at baseline.      Sensory: No sensory deficit.      Motor: No weakness.         Assessment:       ICD-10-CM ICD-9-CM   1. Acute kidney injury superimposed on CKD  N17.9 584.9    N18.9 585.9   2. Dehydration  E86.0 276.51   3. Nausea  R11.0 787.02   4. Acute on chronic combined systolic and diastolic heart failure  I50.43 428.43   5. Depression, unspecified depression type  F32.A 311        Plan:     Problem List Items Addressed This Visit          Cardiac/Vascular    Acute on chronic combined systolic and diastolic heart failure (Chronic)     -volume status very labile   -recently had adjustment to her diuretics due  "to admission for volume overload  -currently on torsemide 20 mg daily, spironolactone 25 mg  -also on Crestor, Xarelto            Renal/    Acute kidney injury superimposed on CKD - Primary     -BUN 80.9 and creatinine 2.97 (01/09/2023) worsened from previous BUN 57 and creatinine 1.67 (12/16/2022)  -recent diuretic adjustment for admission fluid overload, Diamox discontinued and torsemide initiated/also on spironolactone            Dehydration     -recent bouts of nausea, Zofran refilled  -states "too weak to raise glass to drink water  -hypotensive with worsening renal function, weakness, and fatigue, referred to ER             GI    Nausea     -refilling Zofran          Other Visit Diagnoses       Depression, unspecified depression type        Relevant Medications    EScitalopram oxalate (LEXAPRO) 10 MG tablet               Recommended ER eval for hypotension with worsening weakness, fatigue, and RENÉ      Orders Placed This Encounter    EScitalopram oxalate (LEXAPRO) 10 MG tablet    ondansetron (ZOFRAN) 4 MG tablet        Medication List with Changes/Refills   Current Medications    CHOLECALCIFEROL, VITAMIN D3, 125 MCG (5,000 UNIT) CAPSULE    Take 5,000 Int'l Units by mouth once daily.    CLOPIDOGREL (PLAVIX) 75 MG TABLET    Take 75 mg by mouth once daily.    DOCUSATE SODIUM (COLACE) 100 MG CAPSULE    Take 100 mg by mouth 2 (two) times daily.    FENOFIBRATE MICRONIZED (LOFIBRA) 134 MG CAP    Take 134 mg by mouth once daily at 6am.    FOLIC ACID/MULTIVIT-MIN/LUTEIN (CENTRUM SILVER ORAL)    Take 1 tablet by mouth once daily.    HYDROXYZINE HCL (ATARAX) 25 MG TABLET    Take 1 tablet (25 mg total) by mouth every evening.    LEVOTHYROXINE (SYNTHROID) 50 MCG TABLET    Take 1 tablet (50 mcg total) by mouth before breakfast.    NITROGLYCERIN (NITROSTAT) 0.4 MG SL TABLET    Place 0.4 mg under the tongue every 5 (five) minutes as needed. X 3 doses    PANTOPRAZOLE (PROTONIX) 40 MG TABLET    Take 40 mg by mouth 2 (two) " times daily.    POTASSIUM CHLORIDE SA (K-DUR,KLOR-CON) 20 MEQ TABLET    TAKE 1 TABLET BY MOUTH EVERY DAY    RANOLAZINE (RANEXA) 500 MG TB12    Take 500 mg by mouth 2 (two) times daily.    RIVAROXABAN (XARELTO) 2.5 MG TAB    Take 2.5 mg by mouth 2 (two) times daily.    ROSUVASTATIN (CRESTOR) 40 MG TAB    Take 40 mg by mouth once daily.    SPIRONOLACTONE (ALDACTONE) 25 MG TABLET    Take 1 tablet (25 mg total) by mouth once daily.    SUCRALFATE (CARAFATE) 1 GRAM TABLET    Take 1 g by mouth 3 (three) times daily.    TAMSULOSIN (FLOMAX) 0.4 MG CAP    Take 1 capsule (0.4 mg total) by mouth once daily. for 14 days    TORSEMIDE (DEMADEX) 20 MG TAB    Take 1 tablet (20 mg total) by mouth once daily.    VIT A/VIT C/VIT E/ZINC/COPPER (PRESERVISION AREDS ORAL)    Take 1 capsule by mouth once daily.   Changed and/or Refilled Medications    Modified Medication Previous Medication    ESCITALOPRAM OXALATE (LEXAPRO) 10 MG TABLET EScitalopram oxalate (LEXAPRO) 10 MG tablet       Take 1 tablet (10 mg total) by mouth once daily.    Take 1 tablet (10 mg total) by mouth once daily.    ONDANSETRON (ZOFRAN) 4 MG TABLET ondansetron (ZOFRAN) 4 MG tablet       Take 1 tablet (4 mg total) by mouth every 6 (six) hours as needed for Nausea.    Take 1 tablet (4 mg total) by mouth every 6 (six) hours as needed for Nausea.

## 2023-01-10 NOTE — ASSESSMENT & PLAN NOTE
-volume status very labile   -recently had adjustment to her diuretics due to admission for volume overload  -currently on torsemide 20 mg daily, spironolactone 25 mg  -also on Crestor, Xarelto

## 2023-01-10 NOTE — ASSESSMENT & PLAN NOTE
-BUN 80.9 and creatinine 2.97 (01/09/2023) worsened from previous BUN 57 and creatinine 1.67 (12/16/2022)  -recent diuretic adjustment for admission fluid overload, Diamox discontinued and torsemide initiated/also on spironolactone

## 2023-01-10 NOTE — Clinical Note
Diagnosis: Acute renal failure [603929]   Admitting Provider:: MELBA FIELDS [[id number]]   Future Attending Provider: MELBA FIELDS [[id number]]   Reason for IP Medical Treatment  (Clinical interventions that can only be accomplished in the IP setting? ) :: Nephrology consultation, hyperkalemia, repeat labs, fluid resuscitation, Cardiology consultation   Estimated Length of Stay:: 3-4 midnights   I certify that Inpatient services for greater than or equal to 2 midnights are medically necessary:: Yes   Plans for Post-Acute care--if anticipated (pick the single best option):: A. No post acute care anticipated at this time

## 2023-01-10 NOTE — FIRST PROVIDER EVALUATION
Medical screening examination initiated.  I have conducted a focused provider triage encounter, findings are as follows:    Brief history of present illness:  87 y/o female presents with weakness for a while now and was noted to have low blood pressure at doctor's office. No pain. Decrease in appetite.     There were no vitals filed for this visit.    Pertinent physical exam:  alert, nonlabored, in wheelchair, frail female    Brief workup plan:  labs, ekg, urine    Preliminary workup initiated; this workup will be continued and followed by the physician or advanced practice provider that is assigned to the patient when roomed.

## 2023-01-10 NOTE — ASSESSMENT & PLAN NOTE
"-recent bouts of nausea, Zofran refilled  -states "too weak to raise glass to drink water  -hypotensive with worsening renal function, weakness, and fatigue, referred to ER   "

## 2023-01-11 PROBLEM — I25.10 CAD (CORONARY ARTERY DISEASE): Status: RESOLVED | Noted: 2022-01-01 | Resolved: 2023-01-01

## 2023-01-11 PROBLEM — R13.19 ESOPHAGEAL DYSPHAGIA: Status: RESOLVED | Noted: 2022-01-01 | Resolved: 2023-01-01

## 2023-01-11 PROBLEM — I48.91 A-FIB: Chronic | Status: RESOLVED | Noted: 2022-01-01 | Resolved: 2023-01-01

## 2023-01-11 PROBLEM — E78.2 MIXED HYPERLIPIDEMIA: Status: RESOLVED | Noted: 2022-01-01 | Resolved: 2023-01-01

## 2023-01-11 PROBLEM — K21.9 GASTROESOPHAGEAL REFLUX DISEASE: Chronic | Status: RESOLVED | Noted: 2022-01-01 | Resolved: 2023-01-01

## 2023-01-11 PROBLEM — D64.9 ANEMIA: Status: RESOLVED | Noted: 2022-01-01 | Resolved: 2023-01-01

## 2023-01-11 PROBLEM — G45.9 TIA (TRANSIENT ISCHEMIC ATTACK): Status: RESOLVED | Noted: 2022-01-01 | Resolved: 2023-01-01

## 2023-01-11 PROBLEM — R53.1 WEAKNESS: Status: ACTIVE | Noted: 2023-01-01

## 2023-01-11 PROBLEM — R13.12 DYSPHAGIA, OROPHARYNGEAL PHASE: Status: RESOLVED | Noted: 2022-01-01 | Resolved: 2023-01-01

## 2023-01-11 NOTE — PLAN OF CARE
Problem: Occupational Therapy  Goal: Occupational Therapy Goal  Description: Goals to be met by: 2/8/23     Patient will increase functional independence with ADLs by performing:    LE Dressing with Modified [place].  Grooming while standing with Modified [place].  Toileting from toilet with Modified [place] for hygiene and clothing management.   Toilet transfer to toilet with Modified [place].    Outcome: Ongoing, Progressing

## 2023-01-11 NOTE — PLAN OF CARE
Problem: Physical Therapy  Goal: Physical Therapy Goal  Description: Goals to be met by: 23     Patient will increase functional independence with mobility by performin. Supine to sit with Modified [place]  2. Sit to supine with Modified [place]  3. Sit to stand transfer with Modified [place]  4. Bed to chair transfer with Modified [place] using Rolling Walker  5. Gait  x 200 feet with Stand-by Assistance using Rolling Walker.     Outcome: Ongoing, Progressing

## 2023-01-11 NOTE — PT/OT/SLP EVAL
Physical Therapy Evaluation    Patient Name:  Dee Haji   MRN:  [id number]    Recommendations:     Discharge Recommendations: home with home health   Discharge Equipment Recommendations: none   Barriers to discharge: None    Assessment:     Dee Haji is a 88 y.o. female admitted with a medical diagnosis of Acute kidney injury superimposed on CKD.  She presents with the following impairments/functional limitations: weakness, gait instability, impaired balance, impaired endurance, decreased safety awareness, impaired functional mobility. The pt tolerated session well. She c/o numbness in B feet, and demos decreased DF to L foot. The pt was able to perform all functional mobility with min A. Pt would benefit from HH upon discharge.    Rehab Prognosis: Good; patient would benefit from acute skilled PT services to address these deficits and reach maximum level of function.    Recent Surgery: * No surgery found *      Plan:     During this hospitalization, patient to be seen 5 x/week to address the identified rehab impairments via gait training, therapeutic activities, therapeutic exercises and progress toward the following goals:    Plan of Care Expires:  02/11/23    Subjective     Chief Complaint: pain  Patient/Family Comments/goals: return to PLOF  Pain/Comfort:  Location 1: back    Patients cultural, spiritual, Lutheran conflicts given the current situation:      Living Environment:  Home with spouse in residential community. No steps to enter.   Prior to admission, patients level of function was independent.  Equipment used at home: wheelchair, shower chair, walker, rolling.  DME owned (not currently used): none.  Upon discharge, patient will have assistance from spouse.    Objective:     Communicated with NSG prior to session.  Patient found supine with PureWick, peripheral IV  upon PT entry to room.    General Precautions: Standard, fall  Orthopedic Precautions:N/A   Braces: N/A  Respiratory Status:  Room air    Exams:  RLE ROM: WFL  RLE Strength: WFL  LLE ROM: limited DF  LLE Strength: WFL except for DF, 2/5    Functional Mobility:  Bed Mobility:     Scooting: minimum assistance  Supine to Sit: minimum assistance  Transfers:     Sit to Stand:  minimum assistance with rolling walker  Toilet transfer: min A with verbal cues for hand placement.  Gait: 15 feet, min A with RW. Pt requires assist to advance RW.    Patient left up in chair with all lines intact, call button in reach, and NSG notified.    GOALS:   Multidisciplinary Problems       Physical Therapy Goals          Problem: Physical Therapy    Goal Priority Disciplines Outcome Goal Variances Interventions   Physical Therapy Goal     PT, PT/OT Ongoing, Progressing     Description: Goals to be met by: 23     Patient will increase functional independence with mobility by performin. Supine to sit with Modified [place]  2. Sit to supine with Modified [place]  3. Sit to stand transfer with Modified [place]  4. Bed to chair transfer with Modified [place] using Rolling Walker  5. Gait  x 200 feet with Stand-by Assistance using Rolling Walker.                          History:     Past Medical History:   Diagnosis Date    A-fib     A-fib 5/3/2022    CAD (coronary artery disease)     CAD (coronary artery disease) 5/3/2022    CVA (cerebrovascular accident)     Dyspnea on exertion     Gastroesophageal reflux disease 5/3/2022    GERD (gastroesophageal reflux disease)     HTN (hypertension)     Hypothyroidism, unspecified     Mixed hyperlipidemia     Renal disorder     S/P AVR (aortic valve replacement)     Synovial cyst of knee     TIA (transient ischemic attack)     TIA (transient ischemic attack) 5/3/2022    Unspecified hemorrhoids     Unspecified osteoarthritis, unspecified site        Past Surgical History:   Procedure Laterality Date    ANGIOGRAM, CORONARY, WITH LEFT HEART CATHETERIZATION      BUNIONECTOMY Right     CATARACT  EXTRACTION Right     CHOLECYSTECTOMY      CORONARY STENT PLACEMENT      ESOPHAGOGASTRODUODENOSCOPY N/A 11/17/2022    Procedure: EGD W/DIL;  Surgeon: CIARA Nunez MD;  Location: [facility] ENDOSCOPY;  Service: Gastroenterology;  Laterality: N/A;  48FR  Garcia    left lower lobectomy Left        Time Tracking:     PT Received On: 01/11/23  PT Start Time: 0959     PT Stop Time: 1023  PT Total Time (min): 24 min     Billable Minutes: Evaluation 15 and Gait Training 9      01/11/2023

## 2023-01-11 NOTE — PT/OT/SLP EVAL
Occupational Therapy   Evaluation    Name: Dee Haji  MRN: [id number]  Admitting Diagnosis: Acute kidney injury superimposed on CKD  Recent Surgery: * No surgery found *      Recommendations:     Discharge Recommendations: home with home health  Discharge Equipment Recommendations:  none  Barriers to discharge:  None    Assessment:     Dee Haji is a 88 y.o. female with a medical diagnosis of Acute kidney injury superimposed on CKD.  She presents with generalized weakness and decreased activity tolerance. Performance deficits affecting function: weakness, impaired endurance, impaired self care skills, impaired functional mobility.      Rehab Prognosis: Good; patient would benefit from acute skilled OT services to address these deficits and reach maximum level of function.       Plan:     Patient to be seen 3 x/week, 5 x/week to address the above listed problems via self-care/home management, therapeutic activities, therapeutic exercises  Plan of Care Expires: 02/08/23  Plan of Care Reviewed with: patient, spouse    Subjective     Chief Complaint: c/o some back pain upon sitting and B foot numbness  Patient/Family Comments/goals: wants to get stronger    Occupational Profile:  Living Environment: lives with  at [facility] home  Previous level of function: prior to 1-2 weeks ago pt could perform BADL's but had spvn/assist at all time for showers; over last week has needed assist of an aide in the home for dressing also  Roles and Routines: wife  Equipment Used at Home: wheelchair, shower chair, walker, rolling  Assistance upon Discharge: yes    Pain/Comfort:  Pain Rating 1: 5/10  Location 1: back  Pain Addressed 1: Reposition, Distraction    Patients cultural, spiritual, Baptist conflicts given the current situation:      Objective:     Communicated with: nsg and PT prior to session.  Patient found supine with PureWick upon OT entry to room.    General Precautions: Standard, fall  Orthopedic  Precautions:    Braces:    Respiratory Status: Room air    Occupational Performance:    Bed Mobility:    Patient completed Supine to Sit with minimum assistance    Functional Mobility/Transfers:  Patient completed Sit <> Stand Transfer with minimum assistance  with  rolling walker   Functional Mobility: ambulated to BR with min/CGA with RW    Activities of Daily Living:  Toilet transfer with min assist/GB's  Toilet hygiene with total assist in standing    Cognitive/Visual Perceptual:  wfl    Physical Exam:  Wfl Arturo's    [facility] 6 Click ADL:  [facility] Total Score:      Treatment & Education:  POC, importance of OOB, safety with mobility, cc with CNA re: ok to ambulate to BR with RW and assist     Patient left up in chair with all lines intact, call button in reach, and  present    GOALS:   Multidisciplinary Problems       Occupational Therapy Goals          Problem: Occupational Therapy    Goal Priority Disciplines Outcome Interventions   Occupational Therapy Goal     OT, PT/OT Ongoing, Progressing    Description: Goals to be met by: 2/8/23     Patient will increase functional independence with ADLs by performing:    LE Dressing with Modified [place].  Grooming while standing with Modified [place].  Toileting from toilet with Modified [place] for hygiene and clothing management.   Toilet transfer to toilet with Modified [place].                         History:     Past Medical History:   Diagnosis Date    A-fib     A-fib 5/3/2022    CAD (coronary artery disease)     CAD (coronary artery disease) 5/3/2022    CVA (cerebrovascular accident)     Dyspnea on exertion     Gastroesophageal reflux disease 5/3/2022    GERD (gastroesophageal reflux disease)     HTN (hypertension)     Hypothyroidism, unspecified     Mixed hyperlipidemia     Renal disorder     S/P AVR (aortic valve replacement)     Synovial cyst of knee     TIA (transient ischemic attack)     TIA (transient ischemic attack) 5/3/2022     Unspecified hemorrhoids     Unspecified osteoarthritis, unspecified site          Past Surgical History:   Procedure Laterality Date    ANGIOGRAM, CORONARY, WITH LEFT HEART CATHETERIZATION      BUNIONECTOMY Right     CATARACT EXTRACTION Right     CHOLECYSTECTOMY      CORONARY STENT PLACEMENT      ESOPHAGOGASTRODUODENOSCOPY N/A 11/17/2022    Procedure: EGD W/DIL;  Surgeon: CIARA Nunez MD;  Location: [facility] ENDOSCOPY;  Service: Gastroenterology;  Laterality: N/A;  48FR  Garcia    left lower lobectomy Left        Time Tracking:     OT Date of Treatment: 01/11/23  OT Start Time: 0959  OT Stop Time: 1030  OT Total Time (min): 31 min    Billable Minutes:Evaluation 15 min  Self Care/Home Management 16 min    1/11/2023

## 2023-01-11 NOTE — CONSULTS
[facility] New Consult Note    Patient Name: Dee Haji  Admission Date: 1/10/2023  Hospital Length of Stay: 0 days  Code Status: DNR   Attending Physician: Tierra Johnson MD   Primary Care Physician: Tierra Johnson MD  Principal Problem:<principal problem not specified>    HPI:    Dee Haji 88 y.o. female  with A-fib, CAD (coronary artery disease), CVA (cerebrovascular accident), Dyspnea on exertion, GERD (gastroesophageal reflux disease), HTN (hypertension), Hypothyroidism, unspecified, Mixed hyperlipidemia, CKD3b/cardiorenal disease , Systolic CHF, , S/P AVR (aortic valve replacement), Synovial cyst of knee, TIA (transient ischemic attack), Unspecified hemorrhoids, and Unspecified osteoarthritis, unspecified site. presents for had concerns including Weakness (Pt. C/o generalized weakness and low bp while at pcp.. pt. Reports she isn't hungry and hasn't eatin anything today and has just drank orange juice.. was sent here from pcp office ). on 1/10/2023.     Pt recently admitted with CHF exacerbation and severe volume overload. Improved with diuresis    Upon routine labs she had RENÉ, hyperkalemia  Pt was started on IVF   Kshifting maneuvers instituted      We have been consulted for RENÉ        Medical History:   Past Medical History:   Diagnosis Date    A-fib     CAD (coronary artery disease)     CVA (cerebrovascular accident)     Dyspnea on exertion     GERD (gastroesophageal reflux disease)     HTN (hypertension)     Hypothyroidism, unspecified     Mixed hyperlipidemia     Renal disorder     S/P AVR (aortic valve replacement)     Synovial cyst of knee     TIA (transient ischemic attack)     Unspecified hemorrhoids     Unspecified osteoarthritis, unspecified site        Surgical History:   Past Surgical History:   Procedure Laterality Date    ANGIOGRAM, CORONARY, WITH LEFT HEART CATHETERIZATION      BUNIONECTOMY Right     CATARACT EXTRACTION Right     CHOLECYSTECTOMY      CORONARY  STENT PLACEMENT      ESOPHAGOGASTRODUODENOSCOPY N/A 2022    Procedure: EGD W/DIL;  Surgeon: CIARA Nunez MD;  Location: [facility] ENDOSCOPY;  Service: Gastroenterology;  Laterality: N/A;  48FR  Radha    left lower lobectomy Left        Family History:   Family History   Problem Relation Age of Onset    Heart failure Mother     Cancer Father     Diabetes Sister     Diabetes Brother    .     Social History:   Social History     Tobacco Use    Smoking status: Former     Types: Cigarettes     Quit date: 1/3/1972     Years since quittin.0    Smokeless tobacco: Never    Tobacco comments:     quit in    Substance Use Topics    Alcohol use: Not Currently       Allergies:  Review of patient's allergies indicates:  No Known Allergies      Review of Systems:  Constitutional: ++generalized weakness  Skin: Denies wounds, rashes, no skin lesions or itching  EENT: Denies acute hearing/vision changes, tinnitus, or dysphagia  Respiratory:  Denies cough, shortness of breath, or wheezing  Cardiovascular: Denies chest pain, palpitations, or swelling  Gastrointestional: Denies abdominal pain, nausea, vomiting, diarrhea or constipation  Genitourinary: Denies dysuria, hematuria, or incontinence; able to empty bladder  Musculoskeletal: generalized weakness  Neurological: Denies headaches, seizures, paresthesias,   Hematological: Denies unusual bruising or bleeding  Psychiatric: Denies psychiatric concerns, hallucinations, or depression; no confusion    Medications:    Current Facility-Administered Medications:     lactated ringers infusion, 1,000 mL, Intravenous, Continuous, Oscar Ferrara MD    melatonin tablet 6 mg, 6 mg, Oral, Nightly PRN, Oscar Ferrara MD    sodium chloride 0.9% flush 10 mL, 10 mL, Intravenous, PRN, Oscar Ferrara MD    Current Outpatient Medications:     cholecalciferol, vitamin D3, 125 mcg (5,000 unit) capsule, Take 5,000 Int'l Units by mouth once daily., Disp: , Rfl:      clopidogreL (PLAVIX) 75 mg tablet, Take 75 mg by mouth once daily., Disp: , Rfl:     docusate sodium (COLACE) 100 MG capsule, Take 100 mg by mouth 2 (two) times daily., Disp: , Rfl:     EScitalopram oxalate (LEXAPRO) 10 MG tablet, Take 1 tablet (10 mg total) by mouth once daily., Disp: 30 tablet, Rfl: 6    fenofibrate micronized (LOFIBRA) 134 MG Cap, Take 134 mg by mouth once daily at 6am., Disp: , Rfl:     folic acid/multivit-min/lutein (CENTRUM SILVER ORAL), Take 1 tablet by mouth once daily., Disp: , Rfl:     hydrOXYzine HCL (ATARAX) 25 MG tablet, Take 1 tablet (25 mg total) by mouth every evening., Disp: 30 tablet, Rfl: 5    levothyroxine (SYNTHROID) 50 MCG tablet, Take 1 tablet (50 mcg total) by mouth before breakfast., Disp: 30 tablet, Rfl: 11    nitroGLYCERIN (NITROSTAT) 0.4 MG SL tablet, Place 0.4 mg under the tongue every 5 (five) minutes as needed. X 3 doses, Disp: , Rfl:     ondansetron (ZOFRAN) 4 MG tablet, Take 1 tablet (4 mg total) by mouth every 6 (six) hours as needed for Nausea., Disp: 24 tablet, Rfl: 0    pantoprazole (PROTONIX) 40 MG tablet, Take 40 mg by mouth 2 (two) times daily., Disp: , Rfl:     potassium chloride SA (K-DUR,KLOR-CON) 20 MEQ tablet, TAKE 1 TABLET BY MOUTH EVERY DAY, Disp: 90 tablet, Rfl: 0    ranolazine (RANEXA) 500 MG Tb12, Take 500 mg by mouth 2 (two) times daily., Disp: , Rfl:     rivaroxaban (XARELTO) 2.5 mg Tab, Take 2.5 mg by mouth 2 (two) times daily., Disp: , Rfl:     rosuvastatin (CRESTOR) 40 MG Tab, Take 40 mg by mouth once daily., Disp: , Rfl:     spironolactone (ALDACTONE) 25 MG tablet, Take 1 tablet (25 mg total) by mouth once daily., Disp: 30 tablet, Rfl: 11    sucralfate (CARAFATE) 1 gram tablet, Take 1 g by mouth 3 (three) times daily., Disp: , Rfl:     tamsulosin (FLOMAX) 0.4 mg Cap, Take 1 capsule (0.4 mg total) by mouth once daily. for 14 days, Disp: 14 capsule, Rfl: 0    torsemide (DEMADEX) 20 MG Tab, Take 1 tablet (20 mg total) by mouth once daily., Disp:  30 tablet, Rfl: 11    vit A/vit C/vit E/zinc/copper (PRESERVISION AREDS ORAL), Take 1 capsule by mouth once daily., Disp: , Rfl:      Scheduled Meds:  Continuous Infusions:   lactated ringers           Objective:  BP (!) 138/92   Pulse 93   Temp 97.9 °F (36.6 °C)   Resp 16   LMP  (LMP Unknown)   SpO2 98%  There is no height or weight on file to calculate BMI.    No intake/output data recorded.  No intake/output data recorded.        Physical Exam:  General: no acute distress, awake, alert  Eyes: PERRLA, EOMI, conjunctiva clear, eyelids without swelling  HENT: atraumatic, oropharynx and nasal mucosa patent, no difficulty hearing  Neck: full ROM, no JVD, no thyromegaly or lymphadenopathy  Respiratory: equal, unlabored, clear to auscultation A/P  Cardiovascular: RRR , ++RANDALL G3/6 LSB; BL radial and pedal pulses felt  Edema: none  Gastrointestinal: soft, non-tender, non-distended; positive bowel sounds; no masses to palpation  Musculoskeletal: ROM without major limitation or discomfort  Integumentary: warm, dry; no rashes, wounds, or skin lesions  Neurological: oriented, appropriate, no acute deficits      Labs:  Chemistries:   Recent Labs   Lab 01/09/23  0900 01/10/23  1553    135*   K 4.8 6.3*   CO2 31 30   BUN 80.9* 85.2*   CREATININE 2.97* 3.13*   CALCIUM 10.9* 11.0*   BILITOT 1.2 0.9   ALKPHOS 109 94   ALT 17 13   AST 53* 50*   GLUCOSE 105 109   MG 2.30 2.50   PHOS 3.9  --         CBC/Anemia Labs: Coags:    Recent Labs   Lab 01/09/23  0900 01/10/23  1553   WBC 6.4 6.3   HGB 13.0 12.7   HCT 45.1 44.7   * 317   MCV 79.3* 80.5    Recent Labs   Lab 01/10/23  1553   INR 1.07        POCT Glucose: HbA1c:    Recent Labs   Lab 01/10/23  1544 01/10/23  1935   POCTGLUCOSE 131* 203*    Hemoglobin A1c   Date Value Ref Range Status   01/25/2022 5.0 <<=7.0 % Final   05/13/2021 5.2 <<=7.0 % Final   12/07/2020 5.8 <<=7.0 % Final        No results for input(s): COLORU, CLARITYU, SPECGRAV, PHUR, PROTEINUA, GLUCOSEU,  BILIRUBINCON, BLOODU, WBCU, RBCU, BACTERIA, MUCUS, NITRITE, LEUKOCYTESUR, UROBILINOGEN, HYALINECASTS in the last 24 hours.      Impression:    Patient Active Problem List   Diagnosis    CAD (coronary artery disease)    Peripheral vascular disease, unspecified    Stage 3a chronic kidney disease    A-fib    Gastroesophageal reflux disease    Mixed hyperlipidemia    TIA (transient ischemic attack)    HTN (hypertension)    S/P AVR (aortic valve replacement)    SOB (shortness of breath)    Valvular heart disease    Acute kidney injury superimposed on CKD    Advance care planning    Hypothyroidism, unspecified    Esophageal dysphagia    Dysphagia, oropharyngeal phase    Acute on chronic combined systolic and diastolic heart failure    Anemia    Nausea    Dehydration     1- Severe cardiorenal disease with renal function fluctuating with CO and diuresis.  2- current presentation compatible with prerenal azotemia-- early ATN  3-hyperkalemia ( pt on aldactone)      Plan:  Agree with IVF ( slowly)- prefer not to give LR due to hyperkalemia  DC aldactone/KCL  Hold diuretics  Agree with lokelma  Will follow with you       Antonio Almeida      Thank you for this consult.

## 2023-01-11 NOTE — ED PROVIDER NOTES
Encounter Date: 1/10/2023    SCRIBE #1 NOTE: I, Gricelda Villeda, am scribing for, and in the presence of,  Oscar Ferrara MD. I have scribed the following portions of the note - the EKG reading. Other sections scribed: hpi, ros, pe.     History     Chief Complaint   Patient presents with    Weakness     Pt. C/o generalized weakness and low bp while at pcp.. pt. Reports she isn't hungry and hasn't eatin anything today and has just drank orange juice.. was sent here from pcp office      89 y/o female with history of A-fib, CAD, CVA, HTN, and Hypothyroidism presenting to the ED complaining of generalized weakness onset several days ago. Patient states weakness worsened today; she was sent into the ED by her PCP. She also states she cannot walk due to weakness and also complains of loss of appetite; states she hasn't eaten anything today. Patient denies any complaints of pain.   She currently takes XARELTO, protonix, Plavix, synthroid, Crestor, and torsemide  PCP: Tierra Johnson MD   Cardiologist: Dr. Antony Payton, DO    Chart review reveals fairly recent admission, patient was admitted on 12/06/2022 to this hospital at that time she was having some acute on chronic heart failure volume overload and cardiorenal issues.  On the day she had been directed to the hospital by her nephrologist for further evaluation with concern for fluid overload.  She was diuresed here in the emergency department admitted for further evaluation.  Unfortunately there was a prolonged time approximately 6 or 7 days before she was properly diuresed.  She has some problems with retention as well and he would a John which was discontinued.  She was followed closely by Cardiology Nephrology during her stay, she improved and was eventually discharged home.  She evidently has not had significant problems since that time.    The history is provided by the patient. No  was used.   General Illness   The current  episode started several days ago. The problem has been unchanged. The symptoms are relieved by nothing. The symptoms are aggravated by nothing. Pertinent negatives include no fever, no photophobia, no abdominal pain, no constipation, no diarrhea, no nausea, no vomiting, no congestion, no ear discharge, no ear pain, no headaches, no rhinorrhea, no sore throat, no neck pain, no cough, no shortness of breath, no wheezing, no rash, no discharge, no pain and no eye redness. Recently, medical care has been given by the PCP.   Review of patient's allergies indicates:  No Known Allergies  Past Medical History:   Diagnosis Date    A-fib     CAD (coronary artery disease)     CVA (cerebrovascular accident)     Dyspnea on exertion     GERD (gastroesophageal reflux disease)     HTN (hypertension)     Hypothyroidism, unspecified     Mixed hyperlipidemia     Renal disorder     S/P AVR (aortic valve replacement)     Synovial cyst of knee     TIA (transient ischemic attack)     Unspecified hemorrhoids     Unspecified osteoarthritis, unspecified site      Past Surgical History:   Procedure Laterality Date    ANGIOGRAM, CORONARY, WITH LEFT HEART CATHETERIZATION      BUNIONECTOMY Right     CATARACT EXTRACTION Right     CHOLECYSTECTOMY      CORONARY STENT PLACEMENT      ESOPHAGOGASTRODUODENOSCOPY N/A 2022    Procedure: EGD W/DIL;  Surgeon: CIARA Nunez MD;  Location: [facility] ENDOSCOPY;  Service: Gastroenterology;  Laterality: N/A;  48FR  Garcia    left lower lobectomy Left      Family History   Problem Relation Age of Onset    Heart failure Mother     Cancer Father     Diabetes Sister     Diabetes Brother      Social History     Tobacco Use    Smoking status: Former     Types: Cigarettes     Quit date: 1/3/1972     Years since quittin.0    Smokeless tobacco: Never    Tobacco comments:     quit in    Substance Use Topics    Alcohol use: Not Currently    Drug use: Not Currently     Review of Systems    Constitutional:  Positive for appetite change. Negative for chills, fatigue and fever.   HENT:  Negative for congestion, ear discharge, ear pain, nosebleeds, rhinorrhea and sore throat.    Eyes:  Negative for photophobia, pain, discharge and redness.   Respiratory:  Negative for cough, chest tightness, shortness of breath and wheezing.    Cardiovascular:  Negative for chest pain and leg swelling.   Gastrointestinal:  Negative for abdominal pain, blood in stool, constipation, diarrhea, nausea and vomiting.   Genitourinary:  Negative for dysuria, frequency, hematuria and urgency.   Musculoskeletal:  Negative for arthralgias, myalgias and neck pain.   Skin:  Negative for color change and rash.   Neurological:  Positive for weakness. Negative for dizziness, seizures, numbness and headaches.     Physical Exam     Initial Vitals [01/10/23 1544]   BP Pulse Resp Temp SpO2   112/74 86 16 97.9 °F (36.6 °C) 98 %      MAP       --         Physical Exam    Nursing note and vitals reviewed.  Constitutional: She appears well-developed and well-nourished. She is not diaphoretic. She appears ill. No distress.   Thin and ill appearing    HENT:   Head: Normocephalic and atraumatic.   Right Ear: External ear normal.   Left Ear: External ear normal.   Nose: Nose normal.   Mouth/Throat: Oropharynx is clear and moist. Mucous membranes are dry.   Eyes: Conjunctivae and EOM are normal. Pupils are equal, round, and reactive to light. Right eye exhibits no discharge. Left eye exhibits no discharge. No scleral icterus.   Neck: Neck supple. No tracheal deviation present.   Normal range of motion.  Cardiovascular:  Normal rate, regular rhythm and intact distal pulses.     Exam reveals no gallop and no friction rub.       Murmur heard.  Systolic murmur is present with a grade of 2/6.  Systolic murmer is best heard on the right sternal border.   Pulmonary/Chest: Breath sounds normal. No stridor. No respiratory distress. She has no wheezes. She  has no rhonchi. She has no rales. She exhibits no tenderness.   Abdominal: Abdomen is soft. Bowel sounds are normal. She exhibits no distension and no mass. no abdominal tenderness There is no guarding.   Musculoskeletal:         General: No tenderness or edema. Normal range of motion.      Cervical back: Normal range of motion and neck supple.     Neurological: She is alert and oriented to person, place, and time. No cranial nerve deficit or sensory deficit.   Skin: Skin is warm and dry. Capillary refill takes less than 2 seconds. No rash noted. No erythema. No pallor.   Increased skin turgor       ED Course   Critical Care    Date/Time: 1/10/2023 7:55 PM  Performed by: Oscar Ferrara MD  Authorized by: Oscar Ferrara MD   Direct patient critical care time: 10 minutes  Additional history critical care time: 5 minutes  Ordering / reviewing critical care time: 6 minutes  Documentation critical care time: 5 minutes  Consulting other physicians critical care time: 10 minutes  Consult with family critical care time: 5 minutes  Total critical care time (exclusive of procedural time) : 41 minutes  Critical care time was exclusive of separately billable procedures and treating other patients.  Critical care was necessary to treat or prevent imminent or life-threatening deterioration of the following conditions: metabolic crisis and renal failure.  Critical care was time spent personally by me on the following activities: development of treatment plan with patient or surrogate, discussions with consultants, discussions with primary provider, interpretation of cardiac output measurements, evaluation of patient's response to treatment, examination of patient, obtaining history from patient or surrogate, ordering and performing treatments and interventions, ordering and review of laboratory studies, review of old charts, re-evaluation of patient's condition, pulse oximetry and ordering and review of radiographic  studies.  Comments: Patient with hyperkalemia, peaked T-waves, requiring Nephrology consultation, fluids, calcium, glucose, insulin, Lokelma      Labs Reviewed   CBC WITH DIFFERENTIAL - Abnormal; Notable for the following components:       Result Value    RBC 5.55 (*)     MCHC 28.4 (*)     All other components within normal limits   COMPREHENSIVE METABOLIC PANEL - Abnormal; Notable for the following components:    Sodium Level 135 (*)     Potassium Level 6.3 (*)     Chloride 96 (*)     Blood Urea Nitrogen 85.2 (*)     Creatinine 3.13 (*)     Calcium Level Total 11.0 (*)     Protein Total 7.8 (*)     Globulin 4.4 (*)     Albumin/Globulin Ratio 0.8 (*)     Aspartate Aminotransferase 50 (*)     All other components within normal limits   TROPONIN I - Abnormal; Notable for the following components:    Troponin-I 0.072 (*)     All other components within normal limits   APTT - Abnormal; Notable for the following components:    PTT 34.9 (*)     All other components within normal limits   BLOOD SMEAR MICROSCOPIC EXAM ([facility]) - Abnormal; Notable for the following components:    RBC Morph Abnormal (*)     Anisocyte 1+ (*)     Hypochrom 1+ (*)     Macrocyte 1+ (*)     Poik 1+ (*)     Polychrom 1+ (*)     All other components within normal limits   POCT GLUCOSE - Abnormal; Notable for the following components:    POCT Glucose 131 (*)     All other components within normal limits   POCT GLUCOSE - Abnormal; Notable for the following components:    POCT Glucose 203 (*)     All other components within normal limits   MAGNESIUM - Normal   PROTIME-INR - Normal   URINALYSIS, REFLEX TO URINE CULTURE   TROPONIN I   POCT GLUCOSE MONITORING CONTINUOUS     EKG Readings: (Independently Interpreted)   Rhythm: Normal Sinus Rhythm. Heart Rate: 86. Ectopy: No Ectopy. Conduction: 1st Degree AV Block. Axis: Normal.   1538  QTc: 445  Good R wave progression   No ST elevation or depression   PT waves in V2    ECG Results              EKG 12-lead  (Final result)  Result time 01/10/23 17:31:42      Final result by Interface, Lab In [facility] (01/10/23 17:31:42)                   Narrative:    Test Reason : R53.1,    Vent. Rate : 086 BPM     Atrial Rate : 086 BPM     P-R Int : 292 ms          QRS Dur : 104 ms      QT Int : 372 ms       P-R-T Axes : 081 -12 009 degrees     QTc Int : 445 ms    Sinus rhythm with 1st degree A-V block  Otherwise normal ECG  When compared with ECG of 09-DEC-2022 21:07,  Sinus rhythm has replaced Atrial fibrillation  Confirmed by Shaw Sims MD, Emmanuel PERES ([id number]) on 1/10/2023 5:31:28 PM    Referred By:             Confirmed By:Emmanuel Sims                                  Imaging Results              X-Ray Chest AP Portable (Final result)  Result time 01/10/23 20:10:51      Final result by David Parker MD (01/10/23 20:10:51)                   Impression:      No acute findings.  Similar appearance of the chest.      Electronically signed by: David Parker  Date:    01/10/2023  Time:    20:10               Narrative:    EXAMINATION:  XR CHEST AP PORTABLE    CLINICAL HISTORY:  weakness;    COMPARISON:  11 December 2022    FINDINGS:  Portable frontal view of the chest was obtained. Heart and mediastinum are unchanged.  Similar appearance of chronic interstitial changes of the lungs with no new focal consolidation.  No pneumothorax.                                       Medications   sodium chloride 0.9% flush 10 mL (has no administration in time range)   melatonin tablet 6 mg (has no administration in time range)   lactated ringers infusion (has no administration in time range)   calcium gluconate 1 g in NS IVPB (premixed) (0 g Intravenous Stopped 1/10/23 1840)   dextrose 10% bolus 250 mL 250 mL (0 mLs Intravenous Stopped 1/10/23 1845)   insulin regular injection 5 Units 0.05 mL (5 Units Intravenous Given 1/10/23 1847)   sodium zirconium cyclosilicate packet 10 g (10 g Oral Given 1/10/23 1840)   lactated ringers bolus 1,000  mL (0 mLs Intravenous Stopped 1/1935)   ondansetron injection 4 mg (4 mg Intravenous Given 1/10/23 1837)     Medical Decision Making:   Clinical Tests:   Lab Tests: Reviewed and Ordered  Radiological Study: Reviewed and Ordered  Medical Tests: Reviewed and Ordered  ED Management:  Complexity of problems addressed  Co-morbidities and/or factors adding to the complexity or risk for the patient:  Patient with advanced age, cardiorenal syndrome, hyperkalemia, worsening renal failure, recent admission for similar.  Problems addressed:  Hyperkalemia, acute renal injury, generalized weakness, concern for cardiorenal syndrome, electrolyte abnormality, cardiac injury, CAD, dehydration, urinary tract infection, infection  Acute problem/illness or progression/exacerbation of chronic problem with potential threat to life/bodily dysfunction?:  Hyperkalemia potentially causing cardiac arrhythmias  Differential diagnoses/problems considered: see above     Complexity of data reviewed  Independent historian (EMS, parent/caregiver, family/friend):  Spoke with  room reports that patient has been weak, decreased p.o. intake for several days.   Decision to obtain previous or outside records?: yes  Chart Review (nursing home, outside records, CareEverywhere): see above  Review of Rx medications: home medications reviewed Patient on Xarelto and Plavix due to having valve replacements as well stents, Protonix, Synthroid, Crestor, torsemide  Labs/imaging/other tests obtained/considered (risk/benefits of testing discussed):  CBC CMP troponin EKG chest x-ray  My EKG Interpretation: see above  Labs/tests intepretation:  Patient's CBC shows no acute abnormality, hemoglobin 12.7 today, yesterday was 13, on December 16th was 10.8.  Unfortunately her CMP does show some significant abnormalities, yesterday potassium was 4.8, today 6.3, her creatinine has continued to worsen on the 16th of December was 1.6, yesterday 2.9, today 3.13,  BUN is 85.  Troponin minimally elevated 0.072,  My independent imaging interpretation:  Chest x-ray remarkable for sternotomy wires, no obvious infiltrate pneumothorax or rib fracture  Point of care US done/interpretation: -  Discussion with consults/radiologist/social work:  Discussed with Nephrology, will come see and evaluate patient agree with current management,, also discussed with Cardiology, will see patient tomorrow for mildly elevated troponin    Risk of patient management  Treatment/interventions, IV medications, new prescriptions given:  Patient given glucose insulin calcium gluconate Lokelma fluids here in the emergency department for hyperkalemia  High risk management (IV controlled substances, admission, plans for OR, DNR, meds requiring monitoring, transfer, etc)?:  Hyperkalemia admission Nephrology consultation, Cardiology consultation  Workup/treatment affected by social determinants of health?:- none   Advanced care planning/DNR/end of life discussion:  Yes, patient confirms that she is a DNR, has living will come DNI as well.      Shared decision making:  Patient ill-appearing, sent from PCP's office evidently with worsening renal failure, worsening weakness, acute kidney injury.  Patient also is noted to be hypokalemic, worsening kidneys on exam.  Mildly elevated troponin.  Somewhat peaked T-waves.  She is treated empirically glucose, insulin, calcium gluconate, Lokelma, fluids.  Nephrology is consulted will come see patient tonight, cardiologist consulted will see patient tomorrow.  Discussed with PCP, willing to admit.  Patient is willing to be admitted to the hospital further evaluation management.  She has some pretty bad cardiorenal syndrome per chart review.  Care will be transferred.             ED Course as of 01/10/23 2014   Tue Ruy 10, 2023   3415 Spoke with Dr. Almeida, Nephology, will come see patient. [MM]      ED Course User Index  [MM] Oscar Ferrara MD                 Clinical  Impression:   Final diagnoses:  [R53.1] Weakness  [N17.9] Acute renal failure  [E87.5] Hyperkalemia (Primary)  [R77.8] Elevated troponin I level        ED Disposition Condition    Admit Stable                Oscar Ferrara MD  01/10/23 2014

## 2023-01-11 NOTE — PROGRESS NOTES
[facility] Inpatient Progress Note      HPI:     Patient Name: Dee Haji  Admission Date: 1/10/2023  Hospital Length of Stay: 1 days  Code Status: DNR   Attending Physician: Tierra Johnson MD   Primary Care Physician: Tierra Johnson MD  Principal Problem:<principal problem not specified>      Today pt seen and examined  Labs, events, imaging and meds reviewed for this hospital stay  Feeling a bit better  No dyspnea  No NV  Good urine output      Review of Systems:   Constitutional: Denies fever , +weakness  Skin: Denies wounds, no rashes, no itching, no new skin lesions  HEENT: Denies acute change in hearing or vision, tinnitus, or dysphagia  Respiratory:  Denies cough, worsening shortness of breath, or wheezing  Cardiovascular: Denies chest pain, palpitations, or swelling  Gastrointestional: Denies abdominal pain, nausea, vomiting, diarrhea, or constipation  Genitourinary: + suprapubic discomfort  Musculoskeletal: Denies myalgias, back pain  Neurological: Denies headaches, seizures, dizziness, paresthesias or worsening weakness  Hematological: Denies unusual bruising or bleeding  Psychiatric: Denies hallucinations, depression, or confusion      Medications:   Scheduled Meds:  Continuous Infusions:   sodium chloride 0.9% 50 mL/hr at 01/10/23 2030         Vitals:     Vitals:    01/11/23 0434 01/11/23 0444 01/11/23 0504 01/11/23 0729   BP: 126/68  115/72 121/73   Pulse: 93  100 90   Resp:    20   Temp:    98.2 °F (36.8 °C)   TempSrc:    Oral   SpO2: 96% 95%  95%         No intake/output data recorded.  No intake or output data in the 24 hours ending 01/11/23 0809    Physical Exam:   General: no acute distress, awake, alert  Eyes: PERRLA, EOMI, conjunctiva clear, eyelids without swelling  HENT: atraumatic, oropharynx and nasal mucosa patent  Neck: full ROM, + JVD, no thyromegaly or lymphadenopathy  Respiratory: equal, unlabored, basilar rhonchi  Cardiovascular: RRR , RANDALL G3/6 :LSB; BL radial  and pedal pulses felt  Edema: none  Gastrointestinal: soft, non-tender, non-distended; positive bowel sounds;   Musculoskeletal:  ROM without new limitation or discomfort  Integumentary: warm, dry; no rashes, wounds, or skin lesions  Neurological: oriented, appropriate, no acute deficits        Labs:     Cardiac Enzymes: Ejection Fractions:    Recent Labs     01/10/23  1553 01/10/23  1930 01/10/23  2239   TROPONINI 0.072* 0.054* 0.044    EF   Date Value Ref Range Status   12/07/2022 50 % Final        Chemistries:   Recent Labs   Lab 01/09/23  0900 01/10/23  1553 01/10/23  2239 01/11/23  0413    135* 135* 137   K 4.8 6.3* 4.1 4.8   CO2 31 30 26 28   BUN 80.9* 85.2* 68.9* 73.7*   CREATININE 2.97* 3.13* 2.50* 2.51*   CALCIUM 10.9* 11.0* 9.7 10.6*   BILITOT 1.2 0.9 0.8 0.9   ALKPHOS 109 94 73 85   ALT 17 13 10 12   AST 53* 50* 38* 48*   GLUCOSE 105 109 86 82   MG 2.30 2.50  --  2.00   PHOS 3.9  --   --  3.3        CBC/Anemia Labs: Coags:    Recent Labs   Lab 01/09/23  0900 01/10/23  1553 01/11/23 0413   WBC 6.4 6.3 7.6   HGB 13.0 12.7 11.6*   HCT 45.1 44.7 39.5   * 317 253   MCV 79.3* 80.5 79.6*    Recent Labs   Lab 01/10/23  1553   INR 1.07        POCT Glucose: HbA1c:    Recent Labs   Lab 01/10/23  1544 01/10/23  1935   POCTGLUCOSE 131* 203*    Hemoglobin A1c   Date Value Ref Range Status   01/25/2022 5.0 <<=7.0 % Final          Impression:       Patient Active Problem List   Diagnosis    CAD (coronary artery disease)    Peripheral vascular disease, unspecified    Stage 3a chronic kidney disease    A-fib    Gastroesophageal reflux disease    Mixed hyperlipidemia    TIA (transient ischemic attack)    HTN (hypertension)    S/P AVR (aortic valve replacement)    SOB (shortness of breath)    Valvular heart disease    Acute kidney injury superimposed on CKD    Advance care planning    Hypothyroidism, unspecified    Esophageal dysphagia    Dysphagia, oropharyngeal phase    Acute on chronic combined systolic and  diastolic heart failure    Anemia    Nausea    Dehydration     RENÉ secondary to prerenal azotemia---> recovering slowly to baselin  CHF  Cardiorenal disease  Hyperkalemia ( aldactone) --> resolved    Plan:     Cont gentle IVF  Labs in am       Antonio Almeida

## 2023-01-11 NOTE — PLAN OF CARE
01/11/23 1055   Discharge Assessment   Assessment Type Discharge Planning Assessment   Confirmed/corrected address, phone number and insurance Yes   Confirmed Demographics Correct on Facesheet   Source of Information patient   When was your last doctors appointment?   (Patient reports yesterday.)   Communicated JOO with patient/caregiver Yes   Reason For Admission Hyperkalemia   People in Home spouse   Do you have help at home or someone to help you manage your care at home? Yes   Who are your caregiver(s) and their phone number(s)? Spouse: Sanjay Haji: [phone]   Prior to hospitilization cognitive status: Unable to Assess   Current cognitive status: Alert/Oriented   Walking or Climbing Stairs ambulation difficulty, requires equipment   Dressing/Bathing bathing difficulty, assistance 1 person   Home Accessibility wheelchair accessible;other (see comments)  (Patient with ramp at the home.)   Home Layout Able to live on 1st floor   Equipment Currently Used at Home wheelchair;shower chair;walker, rolling   Readmission within 30 days? Yes   Patient currently being followed by outpatient case management? No   Do you currently have service(s) that help you manage your care at home? Yes   Name and Contact number of agency [facility]   Is the pt/caregiver preference to resume services with current agency Yes   Do you take prescription medications? Yes   Do you have prescription coverage? Yes   Coverage Medicare Part A & B   Do you have any problems affording any of your prescribed medications? No   Is the patient taking medications as prescribed? yes   How do you get to doctors appointments? family or friend will provide   Are you on dialysis? No   Do you take coumadin? No   Discharge Plan A Home with family   Discharge Plan B Home Health   DME Needed Upon Discharge  none   Discharge Plan discussed with: Spouse/sig other;Patient   Name(s) and Number(s) Spouse: Sanjay Raissa: [phone]   Discharge Barriers  Identified None   OTHER   Name(s) of People in Home Patient resides with her spouse at home.       Patient is active with [facility]. SW contacted [facility] and spoke with Karly to verify pt's status. [facility]  No barriers to discharge at this time.

## 2023-01-11 NOTE — NURSING
Nurses Note -- 4 Eyes      1/11/2023   10:35 AM      Skin assessed during: Admit      [] No Pressure Injuries Present    []Prevention Measures Documented      [x] Yes- Altered Skin Integrity Present or Discovered   [] LDA Added if Not in Epic (Describe Wound)   [x] New Altered Skin Integrity was Present on Admit and Documented in LDA   [x] Wound Image Taken    Wound Care Consulted? Yes    Attending Nurse:  Halley Baker RN     Second RN/Staff Member:  Gurpreet Menon RN

## 2023-01-11 NOTE — H&P
"[facility] Medicine History & Physical Examination       Patient: Dee Haji  MRN: [id number]  STATUS: IP- Inpatient   DOS: 1/11/2023   PCP: Tierra Johnson MD      CC: weakness       HISTORY OF PRESENT ILLNESS   88 y.o. female patient of Dr Tate; she has a past medical history of Combined systolic and diastolic heart failure, A-fib, CAD (coronary artery disease), CVA (cerebrovascular accident), Dyspnea on exertion, GERD (gastroesophageal reflux disease), HTN (hypertension), Mixed hyperlipidemia, Renal disorder, S/P AVR (aortic valve replacement), Synovial cyst of knee, TIA (transient ischemic attack), Unspecified hemorrhoids, and Unspecified osteoarthritis, unspecified site.  Patient previously seen 12/19/22 for hospital discharge for fluid overload.  At the time, noted to have been discharged on torsemide and had her Diamox discontinued.     She presented to the office on 1-10-22 sitting in wheelchair accompanied by her  with complaints worsening fatigue/ weakness.  Onset within the last 7 days.  Has been suffering from some bouts of nausea with inability to drink much fluids.  However, now feels "too weak to raise glass of water to drink".  Blood pressure in office 88/60 with repeat manual 83/61.  Recently completed blood work with noted BUN 80.9 (01/09/23) worsened from 57 (12/16/22),  creatinine 2.97 worsened from 1.67.       Cardiologist: Dr. Payton   GI: Dr. Nunez  Renal: Dr. Almeida    She is seen on the morning following admission sitting up in bed eating some breakfast this morning he drank her juice and drink her coffee in his eating some grits.  She states she does not want to look at eggs because they eat them at home every morning.  BUN this morning of 73 and creatinine of 2.51 calcium of 10.6.  Nephrology service is following who have started gentle IV fluid resuscitation.  Patient reports just feeling generally weak.    Initial " troponin elevated at 0.072 with repeat of 0.054.  Patient denies any chest pain or shortness of breath.  EKG reviewed with ventricular rate of 86 beats per minute, first-degree AV block, regular rhythm, normal WI intervals and normal ST segment.  Echocardiogram from December 2022 with EF of 40-45% with severe right ventricular enlargement, bioprosthetic aortic valve present with mild central aortic insufficiency.  Gradient of 10 mmHg, moderate TR.  PAP pressure of 60.    REVIEW OF SYSTEMS   Except as documented, all other systems reviewed and negative       PAST MEDICAL HISTORY     Past Medical History:   Diagnosis Date    A-fib     A-fib 5/3/2022    CAD (coronary artery disease)     CAD (coronary artery disease) 5/3/2022    CVA (cerebrovascular accident)     Dyspnea on exertion     Gastroesophageal reflux disease 5/3/2022    GERD (gastroesophageal reflux disease)     HTN (hypertension)     Hypothyroidism, unspecified     Mixed hyperlipidemia     Renal disorder     S/P AVR (aortic valve replacement)     Synovial cyst of knee     TIA (transient ischemic attack)     TIA (transient ischemic attack) 5/3/2022    Unspecified hemorrhoids     Unspecified osteoarthritis, unspecified site           PAST SURGICAL HISTORY     Past Surgical History:   Procedure Laterality Date    ANGIOGRAM, CORONARY, WITH LEFT HEART CATHETERIZATION      BUNIONECTOMY Right     CATARACT EXTRACTION Right     CHOLECYSTECTOMY      CORONARY STENT PLACEMENT      ESOPHAGOGASTRODUODENOSCOPY N/A 11/17/2022    Procedure: EGD W/DIL;  Surgeon: CIARA Nunez MD;  Location: [facility] ENDOSCOPY;  Service: Gastroenterology;  Laterality: N/A;  48FR  Garcia    left lower lobectomy Left           FAMILY HISTORY   Reviewed, negative in relation to patient's current condition.  Family History   Problem Relation Age of Onset    Heart failure Mother     Cancer Father     Diabetes Sister     Diabetes Brother           SOCIAL HISTORY     Social History      Tobacco Use    Smoking status: Former     Types: Cigarettes     Quit date: 1/3/1972     Years since quittin.0    Smokeless tobacco: Never    Tobacco comments:     quit in    Substance Use Topics    Alcohol use: Not Currently          ALLERGIES   Patient has no known allergies.        HOME MEDICATIONS     Current Outpatient Medications   Medication Instructions    cholecalciferol, vitamin D3, 125 mcg (5,000 unit) capsule 5,000 Int'l Units, Oral, Daily    clopidogreL (PLAVIX) 75 mg, Oral, Daily    docusate sodium (COLACE) 100 mg, Oral, 2 times daily    EScitalopram oxalate (LEXAPRO) 10 mg, Oral, Daily    fenofibrate micronized (LOFIBRA) 134 mg, Oral, Daily    folic acid/multivit-min/lutein (CENTRUM SILVER ORAL) 1 tablet, Oral, Daily    hydrOXYzine HCL (ATARAX) 25 mg, Oral, Nightly    levothyroxine (SYNTHROID) 50 mcg, Oral, Before breakfast    nitroGLYCERIN (NITROSTAT) 0.4 mg, Sublingual, Every 5 min PRN, X 3 doses    ondansetron (ZOFRAN) 4 mg, Oral, Every 6 hours PRN    pantoprazole (PROTONIX) 40 mg, Oral, 2 times daily    potassium chloride SA (K-DUR,KLOR-CON) 20 MEQ tablet TAKE 1 TABLET BY MOUTH EVERY DAY    ranolazine (RANEXA) 500 mg, Oral, 2 times daily    rosuvastatin (CRESTOR) 40 mg, Oral, Daily    spironolactone (ALDACTONE) 25 mg, Oral, Daily    sucralfate (CARAFATE) 1 g, Oral, 3 times daily    tamsulosin (FLOMAX) 0.4 mg, Oral, Daily    torsemide (DEMADEX) 20 mg, Oral, Daily    vit A/vit C/vit E/zinc/copper (PRESERVISION AREDS ORAL) 1 capsule, Oral, Daily    XARELTO 2.5 mg, Oral, 2 times daily          PHYSICAL EXAM   VITALS:  /66   Pulse 84   Temp 98.2 °F (36.8 °C) (Oral)   Resp 19   LMP  (LMP Unknown)   SpO2 97%      GENERAL: Awake and in NAD  HEENT: NC/AT, EOMI, PERRL.  NECK: Supple,  No JVD  LUNGS: CTA B/L  CVS: RRR, S1S2 positive  GI/: Soft, NT/ND, bowel sounds positive.  EXTREMITIES: Peripheral pulses 2+, no peripheral edema  DERM: Warm, dry.  No rashes or lesions noted.  NEURO:  AAOx3, no focal neurologic deficit  PSYCH: Cooperative, appropriate mood and affect       LABS     Admission on 01/10/2023   Component Date Value    WBC 01/10/2023 6.3     RBC 01/10/2023 5.55 (H)     Hgb 01/10/2023 12.7     Hct 01/10/2023 44.7     MCV 01/10/2023 80.5     MCH 01/10/2023 22.9     MCHC 01/10/2023 28.4 (L)     RDW 01/10/2023      Platelet 01/10/2023 317     MPV 01/10/2023      Neut % 01/10/2023 76.8     Lymph % 01/10/2023 14.1     Mono % 01/10/2023 6.5     Eos % 01/10/2023 1.3     Basophil % 01/10/2023 0.8     Lymph # 01/10/2023 0.89     Neut # 01/10/2023 4.86     Mono # 01/10/2023 0.41     Eos # 01/10/2023 0.08     Baso # 01/10/2023 0.05     IG# 01/10/2023 0.03     IG% 01/10/2023 0.5     NRBC% 01/10/2023 0.0     Sodium Level 01/10/2023 135 (L)     Potassium Level 01/10/2023 6.3 (HH)     Chloride 01/10/2023 96 (L)     Carbon Dioxide 01/10/2023 30     Glucose Level 01/10/2023 109     Blood Urea Nitrogen 01/10/2023 85.2 (H)     Creatinine 01/10/2023 3.13 (H)     Calcium Level Total 01/10/2023 11.0 (H)     Protein Total 01/10/2023 7.8 (H)     Albumin Level 01/10/2023 3.4     Globulin 01/10/2023 4.4 (H)     Albumin/Globulin Ratio 01/10/2023 0.8 (L)     Bilirubin Total 01/10/2023 0.9     Alkaline Phosphatase 01/10/2023 94     Alanine Aminotransferase 01/10/2023 13     Aspartate Aminotransfera* 01/10/2023 50 (H)     eGFR 01/10/2023 14     Magnesium Level 01/10/2023 2.50     Color, UA 01/11/2023 Yellow     Appearance, UA 01/11/2023 Cloudy (A)     Specific [place], UA 01/11/2023 1.009     pH, UA 01/11/2023 5.5     Protein, UA 01/11/2023 Negative     Glucose, UA 01/11/2023 Negative     Ketones, UA 01/11/2023 Negative     Blood, UA 01/11/2023 Negative     Bilirubin, UA 01/11/2023 Negative     Urobilinogen, UA 01/11/2023 0.2     Nitrites, UA 01/11/2023 Negative     Leukocyte Esterase, UA 01/11/2023 3+ (A)     Troponin-I 01/10/2023 0.072 (H)     PT 01/10/2023 13.8     INR 01/10/2023 1.07     PTT 01/10/2023 34.9 (H)      POCT Glucose 01/10/2023 131 (H)     RBC Morph 01/10/2023 Abnormal (A)     Anisocyte 01/10/2023 1+ (A)     Hypochrom 01/10/2023 1+ (A)     Macrocyte 01/10/2023 1+ (A)     Poik 01/10/2023 1+ (A)     Polychrom 01/10/2023 1+ (A)     Platelet Est 01/10/2023 Normal     Troponin-I 01/10/2023 0.054 (H)     POCT Glucose 01/10/2023 203 (H)     Troponin-I 01/10/2023 0.044     Sodium Level 01/10/2023 135 (L)     Potassium Level 01/10/2023 4.1     Chloride 01/10/2023 98     Carbon Dioxide 01/10/2023 26     Glucose Level 01/10/2023 86     Blood Urea Nitrogen 01/10/2023 68.9 (H)     Creatinine 01/10/2023 2.50 (H)     Calcium Level Total 01/10/2023 9.7     Protein Total 01/10/2023 5.2 (L)     Albumin Level 01/10/2023 2.5 (L)     Globulin 01/10/2023 2.7     Albumin/Globulin Ratio 01/10/2023 0.9 (L)     Bilirubin Total 01/10/2023 0.8     Alkaline Phosphatase 01/10/2023 73     Alanine Aminotransferase 01/10/2023 10     Aspartate Aminotransfera* 01/10/2023 38 (H)     eGFR 01/10/2023 18     Sodium Level 01/11/2023 137     Potassium Level 01/11/2023 4.8     Chloride 01/11/2023 100     Carbon Dioxide 01/11/2023 28     Glucose Level 01/11/2023 82     Blood Urea Nitrogen 01/11/2023 73.7 (H)     Creatinine 01/11/2023 2.51 (H)     Calcium Level Total 01/11/2023 10.6 (H)     Protein Total 01/11/2023 6.2     Albumin Level 01/11/2023 3.0 (L)     Globulin 01/11/2023 3.2     Albumin/Globulin Ratio 01/11/2023 0.9 (L)     Bilirubin Total 01/11/2023 0.9     Alkaline Phosphatase 01/11/2023 85     Alanine Aminotransferase 01/11/2023 12     Aspartate Aminotransfera* 01/11/2023 48 (H)     eGFR 01/11/2023 18     Phosphorus Level 01/11/2023 3.3     Magnesium Level 01/11/2023 2.00     WBC 01/11/2023 7.6     RBC 01/11/2023 4.96     Hgb 01/11/2023 11.6 (L)     Hct 01/11/2023 39.5     MCV 01/11/2023 79.6 (L)     MCH 01/11/2023 23.4     MCHC 01/11/2023 29.4 (L)     RDW 01/11/2023      Platelet 01/11/2023 253     MPV 01/11/2023      Neut % 01/11/2023 71.0      Lymph % 01/11/2023 17.5     Mono % 01/11/2023 8.1     Eos % 01/11/2023 2.2     Basophil % 01/11/2023 0.8     Lymph # 01/11/2023 1.33     Neut # 01/11/2023 5.40     Mono # 01/11/2023 0.62     Eos # 01/11/2023 0.17     Baso # 01/11/2023 0.06     IG# 01/11/2023 0.03     IG% 01/11/2023 0.4     NRBC% 01/11/2023 0.0     RBC, UA 01/11/2023 <5     WBC, UA 01/11/2023 11 (H)     Squamous Epithelial Cell* 01/11/2023 <5     Bacteria, UA 01/11/2023 1+ (A)           DIAGNOSTICS   Fl Modified Barium Swallow Speech    Result Date: 12/12/2022  See procedure notes from Speech Pathologist. This procedure was auto-finalized.    X-Ray Chest AP Portable    Result Date: 1/10/2023  EXAMINATION: XR CHEST AP PORTABLE CLINICAL HISTORY: weakness; COMPARISON: 11 December 2022 FINDINGS: Portable frontal view of the chest was obtained. Heart and mediastinum are unchanged.  Similar appearance of chronic interstitial changes of the lungs with no new focal consolidation.  No pneumothorax.     No acute findings.  Similar appearance of the chest. Electronically signed by: David Parker Date:    01/10/2023 Time:    20:10      X-Ray Chest AP Portable   Final Result      No acute findings.  Similar appearance of the chest.         Electronically signed by: David Parker   Date:    01/10/2023   Time:    20:10            ASSESSMENT     Patient Active Problem List   Diagnosis    Peripheral vascular disease, unspecified    Stage 3a chronic kidney disease    HTN (hypertension)    S/P AVR (aortic valve replacement)    Valvular heart disease    Acute kidney injury superimposed on CKD    Hypothyroidism, unspecified    Acute on chronic combined systolic and diastolic heart failure    Weakness          PLAN:     Diuresis as per renal recommendations  Home medications reconciled  Eliquis on hold secondary to degree of kidney injury.    Start Lovenox 1 milligram/kilogram daily    Prophylaxis: Lovenox   Code Status: DNR      Momo Rodriguez MD  [facility]  Medicine  01/11/2023         If the patient has been admitted under observation status, it is at my discretion and under our care with hospital medicine services. [TWA]

## 2023-01-12 NOTE — PROGRESS NOTES
[facility]                       [address] [id number]    PATIENT NAME:       MISHA GLORIA  YOB: 1934  CSN:                [id number]   MRN:                [id number]  ADMIT DATE:         01/10/2023 15:46:00  PHYSICIAN:          Antony Payton DO                            PROGRESS NOTE    DATE:  01/12/2023 00:00:00    SUBJECTIVE:  Mrs. Gloria is comfortable in her room today, offering no   problems or complaints.  She denies chest discomfort or shortness of breath.    She adds that her strength is improving and her appetite is improving as well.    Her  has just arrived to her bedside and we reviewed the plan of care and   progress.    PHYSICAL EXAMINATION:  VITAL SIGNS:  Today have otherwise been stable.  Physical examination remaining clinically unchanged and essentially stable.    RECOMMENDATIONS:    1. From a cardiac standpoint, she continues to do well and she seems to be   tolerating the medical therapies rather well at this time.  She continues to   tolerate the hydration and currently the saline is running at 100 cc/hour.  2. I will continue to monitor her progress closely with you.  I will continue to   titrate her medical therapies as tolerated.  I agree with the current plan of   care, which includes the gentle hydration and increasing her activities as   tolerated.   3. Further recommendations forthcoming.        ______________________________  Antony Payton DO    CAT/AQS  DD:  01/12/2023  Time:  10:15AM  DT:  01/12/2023  Time:  10:46AM  Job #:  [id number]      PROGRESS NOTE

## 2023-01-12 NOTE — PT/OT/SLP PROGRESS
Occupational Therapy   Treatment    Name: Dee Haji  MRN: [id number]  Admitting Diagnosis:  Acute kidney injury superimposed on CKD       Recommendations:     Discharge Recommendations: home with home health  Discharge Equipment Recommendations:  none  Barriers to discharge:  None    Assessment:     Dee Haji is a 88 y.o. female with a medical diagnosis of Acute kidney injury superimposed on CKD.  She presents with improvement in strength and tolerance today, feeling much better. Performance deficits affecting function are weakness, impaired endurance, impaired self care skills, impaired functional mobility.     Rehab Prognosis:  Good; patient would benefit from acute skilled OT services to address these deficits and reach maximum level of function.       Plan:     Patient to be seen 3 x/week, 5 x/week to address the above listed problems via self-care/home management, therapeutic activities, therapeutic exercises  Plan of Care Expires: 02/08/23  Plan of Care Reviewed with: patient, spouse    Subjective     Pain/Comfort:  Pain Rating 1: 0/10    Objective:     Communicated with: nsg prior to session.  Patient found up in chair with peripheral IV upon OT entry to room.    General Precautions: Standard, fall    Orthopedic Precautions:   Braces:    Respiratory Status: Room air     Occupational Performance:     Bed Mobility:         Functional Mobility/Transfers:  Patient completed Sit <> Stand Transfer with minimum assistance  with  rolling walker from chair  Functional Mobility: ambulated to BR with RW with CGA  Toilet transfer with min assist with grab bar  Stood x ~10 min to perform grooming standing at sink    Activities of Daily Living:  Toilet hygiene standing with total assist  Washed face, brushed teeth standing at sink  with SBA       AMPAC 6 Click ADL:      Treatment & Education:  POC, importance of OOB    Patient left up in chair with all lines intact, call button in reach, and   present    GOALS:   Multidisciplinary Problems       Occupational Therapy Goals          Problem: Occupational Therapy    Goal Priority Disciplines Outcome Interventions   Occupational Therapy Goal     OT, PT/OT Ongoing, Progressing    Description: Goals to be met by: 2/8/23     Patient will increase functional independence with ADLs by performing:    LE Dressing with Modified [place].  Grooming while standing with Modified [place].  Toileting from toilet with Modified [place] for hygiene and clothing management.   Toilet transfer to toilet with Modified [place].                         Time Tracking:     OT Date of Treatment: 01/12/23  OT Start Time: 1052  OT Stop Time: 1125  OT Total Time (min): 33 min    Billable Minutes:Self Care/Home Management 33 min    OT/CORNELIUS: OT     CORNELIUS Visit Number: 1 1/12/2023

## 2023-01-12 NOTE — PLAN OF CARE
Problem: Adult Inpatient Plan of Care  Goal: Plan of Care Review  Outcome: Ongoing, Progressing  Flowsheets (Taken 1/12/2023 0012)  Plan of Care Reviewed With: patient  Goal: Patient-Specific Goal (Individualized)  Outcome: Ongoing, Progressing  Flowsheets (Taken 1/12/2023 0012)  Individualized Care Needs: Monitor renal function labs and electrolytes.  Goal: Absence of Hospital-Acquired Illness or Injury  Outcome: Ongoing, Progressing  Intervention: Identify and Manage Fall Risk  Flowsheets (Taken 1/12/2023 0012)  Safety Promotion/Fall Prevention:   assistive device/personal item within reach   Fall Risk reviewed with patient/family   Fall Risk signage in place   bedside commode chair   Supervised toileting - stay within arms reach   nonskid shoes/socks when out of bed   medications reviewed  Intervention: Prevent Skin Injury  Flowsheets (Taken 1/12/2023 0012)  Body Position: position changed independently  Skin Protection:   tubing/devices free from skin contact   adhesive use limited   skin sealant/moisture barrier applied   silicone foam dressing in place  Intervention: Prevent and Manage VTE (Venous Thromboembolism) Risk  Flowsheets (Taken 1/12/2023 0012)  Activity Management:   Arm raise - L1   Rolling - L1   Straight leg raise - L1  VTE Prevention/Management:   ROM (active) performed   ROM (passive) performed   dorsiflexion/plantar flexion performed   bleeding precations maintained   fluids promoted   intravenous hydration  Range of Motion:   active ROM (range of motion) encouraged   ROM (range of motion) performed  Goal: Optimal Comfort and Wellbeing  Outcome: Ongoing, Progressing  Intervention: Monitor Pain and Promote Comfort  Flowsheets (Taken 1/12/2023 0012)  Pain Management Interventions:   care clustered   pillow support provided  Intervention: Provide Person-Centered Care  Flowsheets (Taken 1/12/2023 0012)  Trust Relationship/Rapport:   care explained   questions answered   choices provided    questions encouraged   emotional support provided   reassurance provided   empathic listening provided   thoughts/feelings acknowledged  Goal: Readiness for Transition of Care  Outcome: Ongoing, Progressing     Problem: Fluid and Electrolyte Imbalance (Acute Kidney Injury/Impairment)  Goal: Fluid and Electrolyte Balance  Outcome: Ongoing, Progressing     Problem: Oral Intake Inadequate (Acute Kidney Injury/Impairment)  Goal: Optimal Nutrition Intake  Outcome: Ongoing, Progressing     Problem: Renal Function Impairment (Acute Kidney Injury/Impairment)  Goal: Effective Renal Function  Outcome: Ongoing, Progressing     Problem: Impaired Wound Healing  Goal: Optimal Wound Healing  Outcome: Ongoing, Progressing  Intervention: Promote Wound Healing  Flowsheets (Taken 1/12/2023 0012)  Sleep/Rest Enhancement: regular sleep/rest pattern promoted  Activity Management:   Arm raise - L1   Rolling - L1   Straight leg raise - L1  Pain Management Interventions:   care clustered   pillow support provided     Problem: Skin Injury Risk Increased  Goal: Skin Health and Integrity  Outcome: Ongoing, Progressing  Intervention: Optimize Skin Protection  Flowsheets (Taken 1/12/2023 0012)  Pressure Reduction Techniques: frequent weight shift encouraged  Skin Protection:   tubing/devices free from skin contact   adhesive use limited   skin sealant/moisture barrier applied   silicone foam dressing in place  Head of Bed (HOB) Positioning: HOB at 30 degrees     Problem: Fall Injury Risk  Goal: Absence of Fall and Fall-Related Injury  Outcome: Ongoing, Progressing  Intervention: Identify and Manage Contributors  Flowsheets (Taken 1/12/2023 0012)  Medication Review/Management: medications reviewed  Intervention: Promote Injury-Free Environment  Flowsheets (Taken 1/12/2023 0012)  Safety Promotion/Fall Prevention:   assistive device/personal item within reach   Fall Risk reviewed with patient/family   Fall Risk signage in place   bedside commode  chair   Supervised toileting - stay within arms reach   nonskid shoes/socks when out of bed   medications reviewed

## 2023-01-12 NOTE — PROGRESS NOTES
"[facility] Hospital Medicine Progress Note        Chief Complaint: Inpatient Follow-up for demetrius, uti, weakness    HPI: 88 y.o. female patient of Dr Tate; she has a past medical history of Combined systolic and diastolic heart failure, A-fib, CAD (coronary artery disease), CVA (cerebrovascular accident), Dyspnea on exertion, GERD (gastroesophageal reflux disease), HTN (hypertension), Mixed hyperlipidemia, Renal disorder, S/P AVR (aortic valve replacement), Synovial cyst of knee, TIA (transient ischemic attack), Unspecified hemorrhoids, and Unspecified osteoarthritis, unspecified site.  Patient previously seen 12/19/22 for hospital discharge for fluid overload.  At the time, noted to have been discharged on torsemide and had her Diamox discontinued.     She presented to the office on 1-10-22 sitting in wheelchair accompanied by her  with complaints worsening fatigue/ weakness.  Onset within the last 7 days.  Has been suffering from some bouts of nausea with inability to drink much fluids.  However, now feels "too weak to raise glass of water to drink".  Blood pressure in office 88/60 with repeat manual 83/61.  Recently completed blood work with noted BUN 80.9 (01/09/23) worsened from 57 (12/16/22),  creatinine 2.97 worsened from 1.67.       Cardiologist: Dr. Payton   GI: Dr. Nunez  Renal: Dr. Almeida     She is seen on the morning following admission sitting up in bed eating some breakfast this morning he drank her juice and drink her coffee in his eating some grits.  She states she does not want to look at eggs because they eat them at home every morning.  BUN this morning of 73 and creatinine of 2.51 calcium of 10.6.  Nephrology service is following who have started gentle IV fluid resuscitation.  Patient reports just feeling generally weak.     Initial troponin elevated at 0.072 with repeat of 0.054.  Patient denies any chest pain or shortness of breath.  EKG reviewed " with ventricular rate of 86 beats per minute, first-degree AV block, regular rhythm, normal AK intervals and normal ST segment.  Echocardiogram from December 2022 with EF of 40-45% with severe right ventricular enlargement, bioprosthetic aortic valve present with mild central aortic insufficiency.  Gradient of 10 mmHg, moderate TR.  PAP pressure of 60.    Interval Hx:   Patient had a good night, she said she slept well.  Urine culture with more than 100,000 colony-forming units of Gram-negative rods.  Started on Rocephin this morning by nephrology.  BUN of 60 and creatinine of 2.13    Objective/physical exam:  General: In no acute distress, afebrile, thin elderly female  Chest: Clear to auscultation bilaterally  Heart: RRR, +S1, S2, no appreciable murmur  Abdomen: Soft, nontender, BS +  MSK: Warm, no lower extremity edema, no clubbing or cyanosis  Neurologic: Alert and oriented x4, Cranial nerve II-XII intact, Strength 5/5 in all 4 extremities    VITAL SIGNS: 24 HRS MIN & MAX LAST   Temp  Min: 97.5 °F (36.4 °C)  Max: 98.1 °F (36.7 °C) 97.8 °F (36.6 °C)   BP  Min: 101/69  Max: 122/79 117/77   Pulse  Min: 76  Max: 99  88   Resp  Min: 16  Max: 19 16   SpO2  Min: 94 %  Max: 100 % 96 %       Recent Labs   Lab 01/09/23  0900 01/10/23  1553 01/11/23 0413   WBC 6.4 6.3 7.6   RBC 5.69* 5.55* 4.96   HGB 13.0 12.7 11.6*   HCT 45.1 44.7 39.5   MCV 79.3* 80.5 79.6*   MCH 22.8 22.9 23.4   MCHC 28.8* 28.4* 29.4*   * 317 253       Recent Labs   Lab 01/10/23  1553 01/10/23  2239 01/11/23  0413 01/12/23  0327   * 135* 137 137   K 6.3* 4.1 4.8 4.2   CO2 30 26 28 28   BUN 85.2* 68.9* 73.7* 60.3*   CREATININE 3.13* 2.50* 2.51* 2.13*   CALCIUM 11.0* 9.7 10.6* 9.3   MG 2.50  --  2.00 1.80   ALBUMIN 3.4 2.5* 3.0* 2.5*   ALKPHOS 94 73 85 79   ALT 13 10 12 9   AST 50* 38* 48* 40*   BILITOT 0.9 0.8 0.9 0.8          Microbiology Results (last 7 days)       Procedure Component Value Units Date/Time    Urine culture [[id number]]   (Abnormal) Collected: 01/11/23 0641    Order Status: Completed Specimen: Urine Updated: 01/12/23 0751     Urine Culture >/= 100,000 colonies/ml Gram-negative Rods             See below for Radiology    Scheduled Med:   atorvastatin  40 mg Oral Daily    cefTRIAXone (ROCEPHIN) IVPB  1 g Intravenous Q24H    clopidogreL  75 mg Oral Daily    docusate sodium  100 mg Oral BID    enoxaparin  1 mg/kg Subcutaneous Q24H    EScitalopram oxalate  10 mg Oral Daily    levothyroxine  50 mcg Oral Before breakfast    pantoprazole  40 mg Oral BID    ranolazine  500 mg Oral BID        Continuous Infusions:   sodium chloride 0.9% 50 mL/hr at 01/12/23 0600        PRN Meds:  acetaminophen, melatonin, sodium chloride 0.9%       Assessment/Plan:  Patient Active Problem List   Diagnosis    Peripheral vascular disease, unspecified    Stage 3a chronic kidney disease    HTN (hypertension)    S/P AVR (aortic valve replacement)    Valvular heart disease    Acute kidney injury superimposed on CKD    Hypothyroidism, unspecified    Acute on chronic combined systolic and diastolic heart failure    Weakness     Rocephin day 1 for UTI with Gram-negative rods awaiting final identification.  Renal indices improving  NS @50  Continued PT and OT services.  Dietary supplements TID  Hopeful DC tomorrow    Patient condition:  Stable      All diagnosis and differential diagnosis have been reviewed; assessment and plan has been documented; I have personally reviewed the labs and test results that are presently available; I have reviewed the patients medication list; I have reviewed the consulting providers response and recommendations. I have reviewed or attempted to review medical records based upon their availability    All of the patient's questions have been  addressed and answered. Patient's is agreeable to the above stated plan. I will continue to monitor closely and make adjustments to medical management as needed.      X-Ray Chest AP  Portable  Narrative: EXAMINATION:  XR CHEST AP PORTABLE    CLINICAL HISTORY:  weakness;    COMPARISON:  11 December 2022    FINDINGS:  Portable frontal view of the chest was obtained. Heart and mediastinum are unchanged.  Similar appearance of chronic interstitial changes of the lungs with no new focal consolidation.  No pneumothorax.  Impression: No acute findings.  Similar appearance of the chest.    Electronically signed by: David Parker  Date:    01/10/2023  Time:    20:10      Momo Rodriguez MD   01/12/2023

## 2023-01-12 NOTE — PROGRESS NOTES
[facility] - On[facility] Acute  Wound Care    Patient Name:  Dee Haji   MRN:  [id number]  Date: 2023  Diagnosis: Acute kidney injury superimposed on CKD    History:     Past Medical History:   Diagnosis Date    A-fib     A-fib 5/3/2022    CAD (coronary artery disease)     CAD (coronary artery disease) 5/3/2022    CVA (cerebrovascular accident)     Dyspnea on exertion     Gastroesophageal reflux disease 5/3/2022    GERD (gastroesophageal reflux disease)     HTN (hypertension)     Hypothyroidism, unspecified     Mixed hyperlipidemia     Renal disorder     S/P AVR (aortic valve replacement)     Synovial cyst of knee     TIA (transient ischemic attack)     TIA (transient ischemic attack) 5/3/2022    Unspecified hemorrhoids     Unspecified osteoarthritis, unspecified site        Social History     Socioeconomic History    Marital status:    Tobacco Use    Smoking status: Former     Types: Cigarettes     Quit date: 1/3/1972     Years since quittin.0    Smokeless tobacco: Never    Tobacco comments:     quit in    Substance and Sexual Activity    Alcohol use: Not Currently    Drug use: Not Currently       Precautions:     Allergies as of 01/10/2023    (No Known Allergies)       C Assessment Details/Treatment     Initial visit, noting vulnerable blanchable erythema over sacrum and buttocks, patient is resting on a pressure redistribution mattress and demonstrates ability to reposition self for off loading of bony prominences.       23 0830        Altered Skin Integrity 23 1000 Sacral spine Intact skin with non-blanchable redness of localized area   Date First Assessed/Time First Assessed: 23 1000   Altered Skin Integrity Present on Admission: yes  Location: Sacral spine  Is this injury device related?: No  Description of Altered Skin Integrity: Intact skin with non-blanchable redness of lo...   Wound Image    Description of Altered Skin Integrity   (blancahble  erythema, dressed with preventive foam)   Dressing Appearance Intact;Dry;Clean   Drainage Amount None   Appearance Pink;Red;Intact   Tissue loss description Not applicable   Periwound Area Intact   Wound Edges Undefined   Dressing Change Due 01/15/23       Recommendations made to primary team for continued use of preventive foam. Orders in place.     01/12/2023

## 2023-01-12 NOTE — PT/OT/SLP PROGRESS
Physical Therapy Treatment    Patient Name:  Dee Haji   MRN:  [id number]    Recommendations:     Discharge Recommendations: home with home health  Discharge Equipment Recommendations: none  Barriers to discharge: None    Assessment:     Dee Haji is a 88 y.o. female admitted with a medical diagnosis of Acute kidney injury superimposed on CKD.  She presents with the following impairments/functional limitations: weakness, gait instability, impaired balance, impaired endurance, decreased safety awareness, impaired functional mobility. Pt is progressing well toward goals, and demos improved endurance/strength during session today.    Rehab Prognosis: Good; patient would benefit from acute skilled PT services to address these deficits and reach maximum level of function.    Recent Surgery: * No surgery found *      Plan:     During this hospitalization, patient to be seen 5 x/week to address the identified rehab impairments via gait training, therapeutic activities, therapeutic exercises and progress toward the following goals:    Plan of Care Expires:  02/11/23    Subjective     Chief Complaint: none  Patient/Family Comments/goals: return to PLOF  Pain/Comfort:  Location 1: back  Pain Addressed 1: Reposition, Distraction      Objective:     Communicated with NSG prior to session.  Patient found supine with peripheral IV upon PT entry to room.     General Precautions: Standard, fall  Orthopedic Precautions: N/A  Braces: N/A  Respiratory Status: Room air     Functional Mobility:  Bed Mobility:     Scooting: stand by assistance  Supine to Sit: minimum assistance- assist required at trunk  Transfers:  Sit to Stand:  minimum assistance with rolling walker  Gait: pt ambulates x 50 feet in room with min A and RW. Pt able to navigate turns with non cueing required.    Treatment & Education:  Pt able to perform BLE there-ex while seated in bedside chair, including hip flexion, knee extension, hip abd/add, and ankle  [Time Spent: ___ minutes] : I have spent [unfilled] minutes of time on the encounter. pumps.    Patient left up in chair with all lines intact, call button in reach, and NSG notified..    GOALS:   Multidisciplinary Problems       Physical Therapy Goals          Problem: Physical Therapy    Goal Priority Disciplines Outcome Goal Variances Interventions   Physical Therapy Goal     PT, PT/OT Ongoing, Progressing     Description: Goals to be met by: 23     Patient will increase functional independence with mobility by performin. Supine to sit with Modified [place]  2. Sit to supine with Modified [place]  3. Sit to stand transfer with Modified [place]  4. Bed to chair transfer with Modified [place] using Rolling Walker  5. Gait  x 200 feet with Stand-by Assistance using Rolling Walker.                          Time Tracking:     PT Received On: 23  PT Start Time: 1032     PT Stop Time: 1055  PT Total Time (min): 23 min     Billable Minutes: Gait Training 15 and Therapeutic Exercise 8    Treatment Type: Treatment  PT/PTA: PT     PTA Visit Number: 1     2023   [FreeTextEntry3] : \par \par

## 2023-01-12 NOTE — PROGRESS NOTES
[facility] Inpatient Progress Note      HPI:     Patient Name: Dee Haji  Admission Date: 1/10/2023  Hospital Length of Stay: 2 days  Code Status: DNR   Attending Physician: Tierra Johnson MD   Primary Care Physician: Tierra Johnson MD  Principal Problem:Acute kidney injury superimposed on CKD      Today patient seen and examined  Labs, recent events, imaging and medications reviewed for this hospital stay  Feeling better   No dyspnea  Good urine output    Review of Systems:   Constitutional: Denies fever, fatigue, generalized weakness, night sweats  Skin: Denies wounds, no rashes, no itching, no new skin lesions  HEENT: Denies acute change in hearing or vision, tinnitus, or dysphagia  Respiratory:  Denies cough, shortness of breath, or wheezing  Cardiovascular: Denies chest pain, palpitations, or swelling  Gastrointestional: Denies abdominal pain, nausea, vomiting, diarrhea, or constipation  Genitourinary: Denies dysuria, hematuria, or incontinence; reports able to empty bladder  Musculoskeletal: Denies myalgias, back pain, flank pain, new decreased ROM or acute focal weakness  Neurological: Denies headaches, seizures, dizziness, paresthesias or asterixis  Hematological: Denies unusual bruising or bleeding  Psychiatric: Denies hallucinations, depression, or confusion      Medications:   Scheduled Meds:   atorvastatin  40 mg Oral Daily    clopidogreL  75 mg Oral Daily    docusate sodium  100 mg Oral BID    enoxaparin  1 mg/kg Subcutaneous Q24H    EScitalopram oxalate  10 mg Oral Daily    levothyroxine  50 mcg Oral Before breakfast    pantoprazole  40 mg Oral BID    ranolazine  500 mg Oral BID     Continuous Infusions:   sodium chloride 0.9% 50 mL/hr at 01/12/23 0600         Vitals:     Vitals:    01/11/23 2010 01/11/23 2300 01/12/23 0325 01/12/23 0730   BP: 101/69 117/77 108/73 117/77   Pulse: 86 99 76 88   Resp: 16 18 16 16   Temp: 98.1 °F (36.7 °C) 97.5 °F (36.4 °C) 97.5 °F (36.4 °C) 97.8  °F (36.6 °C)   TempSrc:   Oral Oral   SpO2: 100% 97% 96% 96%   Weight:       Height:             I/O last 3 completed shifts:  In: 910 [P.O.:360; I.V.:550]  Out: 200 [Urine:200]    Intake/Output Summary (Last 24 hours) at 1/12/2023 0823  Last data filed at 1/12/2023 0600  Gross per 24 hour   Intake 910 ml   Output 200 ml   Net 710 ml       Physical Exam:   General: no acute distress, awake, alert  Eyes: PERRLA, EOMI, conjunctiva clear, eyelids without swelling  HENT: atraumatic, oropharynx and nasal mucosa patent  Neck: full ROM, +JVD, no thyromegaly or lymphadenopathy  Respiratory: equal, unlabored, few basilar rhonchi  Cardiovascular: RRR ++RANDALL g3/6 LSB; BL radial and pedal pulses felt  Edema: none  Gastrointestinal: soft, non-tender, non-distended; positive bowel sounds; no masses to palpation  Musculoskeletal: ROM without new limitation or discomfort  Integumentary: warm, dry; no rashes, wounds, or new skin lesions  Neurological: oriented, appropriate, no acute deficits        Labs:     Chemistries:   Recent Labs   Lab 01/09/23  0900 01/10/23  1553 01/10/23  2239 01/11/23  0413 01/12/23  0327    135* 135* 137 137   K 4.8 6.3* 4.1 4.8 4.2   CO2 31 30 26 28 28   BUN 80.9* 85.2* 68.9* 73.7* 60.3*   CREATININE 2.97* 3.13* 2.50* 2.51* 2.13*   CALCIUM 10.9* 11.0* 9.7 10.6* 9.3   BILITOT 1.2 0.9 0.8 0.9 0.8   ALKPHOS 109 94 73 85 79   ALT 17 13 10 12 9   AST 53* 50* 38* 48* 40*   GLUCOSE 105 109 86 82 95   MG 2.30 2.50  --  2.00 1.80   PHOS 3.9  --   --  3.3  --         CBC/Anemia Labs: Coags:    Recent Labs   Lab 01/09/23  0900 01/10/23  1553 01/11/23 0413   WBC 6.4 6.3 7.6   HGB 13.0 12.7 11.6*   HCT 45.1 44.7 39.5   * 317 253   MCV 79.3* 80.5 79.6*    Recent Labs   Lab 01/10/23  1553   INR 1.07          Impression:     Patient Active Problem List   Diagnosis    Peripheral vascular disease, unspecified    Stage 3a chronic kidney disease    HTN (hypertension)    S/P AVR (aortic valve replacement)     Valvular heart disease    Acute kidney injury superimposed on CKD    Hypothyroidism, unspecified    Acute on chronic combined systolic and diastolic heart failure    Weakness     RENÉ secondary to Prerenal azotemia/early ATN---->recovering  Severe cardiorenal disease  Hyperkalemia resolved    Plan:     Cont IVF at 50cc/hour  Will likely need very close FU and monitoring as outpt ( at least once a month)   Urine culture noted  Will start IV rocephin pnd final results    Antonio Almeida

## 2023-01-12 NOTE — CONSULTS
Inpatient Nutrition Evaluation    Admit Date: 1/10/2023   Total duration of encounter: 2 days    Nutrition Recommendation/Prescription     Continue Renal diet as tolerated. Can transition to Low Sodium as renal function improves.   Continue Boost Breeze with meals    Nutrition Assessment     Chart Review    Reason Seen: physician consult for poor intake    Malnutrition Screening Tool Results   Have you recently lost weight without trying?: Unsure  Have you been eating poorly because of a decreased appetite?: Yes   MST Score: 3     Diagnosis:  RENÉ secondary to Prerenal azotemia/early ATN---->recovering  Severe cardiorenal disease  Hyperkalemia resolved    Relevant Medical History: PVD, CKD, HTN, AVR, VHD, CHF, Combined systolic and diastolic heart failure, A-fib, CAD (coronary artery disease), CVA (cerebrovascular accident), Dyspnea on exertion, GERD (gastroesophageal reflux disease), HTN (hypertension), Mixed hyperlipidemia, Renal disorder, S/P AVR (aortic valve replacement), Synovial cyst of knee, TIA (transient ischemic attack), Unspecified hemorrhoids, and Unspecified osteoarthritis    Nutrition-Related Medications: rocephin, pantoprazole    Nutrition-Related Labs:  1/11: Hgb-11.6  11/12: Bun-60.3, Crea-2.13, GFR-22    Diet Order: DIET RENAL NON-DIALYSIS  Oral Supplement Order: Boost Breeze  Appetite/Oral Intake: fair/50-75% of meals  Factors Affecting Nutritional Intake:  weakness  Food/Latter-day/Cultural Preferences: none reported  Food Allergies: no known food allergies       Wound(s):      Altered Skin Integrity 01/11/23 1000 Sacral spine Intact skin with non-blanchable redness of localized area-Tissue loss description: Not applicable noted    Comments    1/12: Pt working with therapy at time of visit. Consulted for poor intake prior to admit. Pt now eating better and feeling better. Still with some weakness but no longer affecting her intake. Boost Breeze was added for ONS. Recommend to continue good intake  at meals and supplement in between with Boost.     Anthropometrics    Height: 5' (152.4 cm) Height Method: Measured  Last Weight: 39.2 kg (86 lb 6.7 oz) (01/11/23 0944) Weight Method: Bed Scale  BMI (Calculated): 16.9  BMI Classification: underweight (BMI less than 18.5)     Ideal Body Weight (IBW), Female: 100 lb     % Ideal Body Weight, Female (lb): 86.42 %                             Usual Weight Provided By: EMR weight history    Wt Readings from Last 3 Encounters:   01/11/23 0944 39.2 kg (86 lb 6.7 oz)   01/10/23 1345 (P) 38.4 kg (84 lb 9.6 oz)   12/20/22 1335 42.1 kg (92 lb 13 oz)      Weight Change(s) Since Admission:  Admit Weight: 39.2 kg (86 lb 6.7 oz) (01/11/23 0944)  39.2kg    Patient Education    Not applicable.    Monitoring & Evaluation     Dietitian will monitor food and beverage intake.  Nutrition Risk/Follow-Up: low (follow-up in 5-7 days)  Patients assigned 'low nutrition risk' status do not qualify for a full nutritional assessment but will be monitored and re-evaluated in a 5-7 day time period. Please consult if re-evaluation needed sooner.

## 2023-01-12 NOTE — CONSULTS
[facility]                       [address] [id number]    PATIENT NAME:       MISHA GLORIA  YOB: 1934  CSN:                [id number]   MRN:                [id number]  ADMIT DATE:         01/10/2023 15:46:00  PHYSICIAN:          Antony Payton DO                            CONSULTATION    DATE OF CONSULT:  01/10/2023 00:00:00    REASON FOR CONSULTATION:    1. Cardiovascular management.  2. History of valvular heart disease.    HISTORY:  Mrs. Price is a rather pleasant 88-year-old white female well known to   myself with a past medical history that is significant for coronary artery   disease and valvular heart disease, having undergone aortic valve replacement   surgery and percutaneous coronary intervention in the past.  The patient was in   her usual state of health until the last few days when she started to develop   progressively worsening weakness, fatigue, lethargy, and generalized weakness.    The patient states that she recently relocated to assisted-living facility with   her  and started to notice some episodes of confusion along with   reduction in her usual level of courage and not wanting to take her medication.    She presents to the emergency department per the recommendations of her nurse   at the assisted-living facility for evaluation and management at this time.    During initial evaluation, she was noted to have low blood pressure to 83/61,   and she also complained of having a very poor appetite with her meals and also   to drinking fluids.  She denies any chest discomfort or any significant   cardiovascular issues or complaints during my initial evaluation.    PHYSICAL EXAMINATION:  VITALS ON ADMISSION:  Blood pressure 83/61 and then 112/74, heart rate 86 beats   per minute, O2 saturation 98% on room air, respiratory rate 16, and she is   afebrile.    HEENT:  Grossly  unremarkable.  NECK:  Supple.  No JVD noted.  Soft bilateral carotid bruits noted.    CARDIAC:  Regular rhythm.  Normal sound of bioprosthetic aortic valve sounds but   otherwise a soft systolic ejection murmur and regular heart tones.  PULMONARY:  Lung fields clear to auscultation bilaterally anteriorly.  ABDOMEN:  Soft but otherwise benign.  EXTREMITIES:  No significant peripheral edema.    PAST MEDICAL/PAST SURGICAL HISTORY:  Significant for a history of bioprosthetic   aortic valve replacement, history of open heart surgery, gastroesophageal reflux   disease, hypertension, history of CVA/TIA, history of hemorrhoids.  She has   also suffered a synovial cyst of the knee, paroxysmal atrial fibrillation,   coronary artery disease with percutaneous coronary intervention, dyslipidemia,   chronic kidney disease, and hypothyroidism.  She has undergone a cholecystectomy   in the past, partial left lung lobectomy, bunionectomy, cataract surgery, and   EGD in November 2022 with Dr. Parish Nunez.    ALLERGIES:  SHE HAS NO KNOWN FOOD OR DRUG ALLERGIES.     CURRENT HOME MEDICATIONS:  Include:  1. Vitamin D.  2. Plavix.  3. Colace.  4. Lexapro.  5. Lofibra.  6. Centrum Silver.  7. Hydroxyzine.    8. Levothyroxine.  9. Sublingual nitroglycerin.    10. Zofran.  11. Protonix.  12. Potassium.  13. Xarelto 2.5 mg twice daily.  14. Ranexa 500 mg twice daily.  15. Spironolactone.  16. Sucralfate.  17. Tamsulosin.  18. Torsemide.    FAMILY HISTORY:  Noncontributory.    SOCIAL HISTORY:  Negative for alcohol, tobacco, or illicit drug abuse.    REVIEW OF SYSTEMS:  As per history of present illness.    DATA REVIEWED:  Laboratory data on admission:  White cell count is 6300,   hemoglobin 12.7, hematocrit 44.7, platelet count is 317,000.  Sodium is 135,   potassium is elevated at 6.3, chloride 96, CO2 is 30, BUN 85.2, creatinine 3.13,   glucose is 109.  LFTs are within normal limits.  Cholesterol within normal   limits.  BNP level  noted.  Troponin slightly elevated at 0.072.  Hemoglobin A1c   normal.    Chest x-ray on admission shows no acute cardiopulmonary abnormalities.      EKG on admission shows sinus rhythm at 86 beats per minute with a 1st-degree AV   block.    Echocardiogram in December 2022 shows LVEF in the range of 40% to 45%, evidence   of diastolic dysfunction, left atrial enlargement, mild mitral regurgitation.    Bioprosthetic showed a well-seated bioprosthetic aortic valve with mild aortic   insufficiency, moderate tricuspid regurgitation, and evidence of severe   pulmonary hypertension.    IMPRESSION:    1. Probable dehydration/volume depletion.  2. Acute-on-chronic kidney disease with hyperkalemia.  3. Elevated BNP level, though no clinical signs of heart failure.  4. History of valvular heart disease with bioprosthetic aortic valve   replacement.  5. Coronary artery disease with history of percutaneous coronary intervention.  6. Paroxysmal atrial fibrillation, now in sinus rhythm.  7. History of cerebrovascular accident/transient ischemic attack.  8. Gastroesophageal reflux disease.  9. Dyslipidemia.  10. Hypothyroidism.    RECOMMENDATIONS:  I agree with current plan of care, which essentially includes   indications for further evaluation and management and gentle hydration back to a   euvolemic state.  Will continue to monitor laboratory data closely including   trending the cardiac enzymes.  Will resume her current home medications as   tolerated, continue to monitor vital signs closely, and continue to monitor on   telemetry.  We may need to consider echocardiography to guide medical therapies   should her progress not improve substantially over the next few days.  Further   recommendations forthcoming.        ______________________________  Antony Payton DO    CAT/AQS  DD:  01/11/2023  Time:  11:10PM  DT:  01/12/2023  Time:  12:08AM  Job #:  [id number]    cc:   Antony Payton, DO        CONSULTATION

## 2023-01-12 NOTE — PLAN OF CARE
Problem: Adult Inpatient Plan of Care  Goal: Plan of Care Review  Outcome: Ongoing, Progressing  Goal: Patient-Specific Goal (Individualized)  Outcome: Ongoing, Progressing  Goal: Absence of Hospital-Acquired Illness or Injury  Outcome: Ongoing, Progressing  Goal: Optimal Comfort and Wellbeing  Outcome: Ongoing, Progressing  Goal: Readiness for Transition of Care  Outcome: Ongoing, Progressing     Problem: Fluid and Electrolyte Imbalance (Acute Kidney Injury/Impairment)  Goal: Fluid and Electrolyte Balance  Outcome: Ongoing, Progressing     Problem: Oral Intake Inadequate (Acute Kidney Injury/Impairment)  Goal: Optimal Nutrition Intake  Outcome: Ongoing, Progressing     Problem: Renal Function Impairment (Acute Kidney Injury/Impairment)  Goal: Effective Renal Function  Outcome: Ongoing, Progressing     Problem: Impaired Wound Healing  Goal: Optimal Wound Healing  Outcome: Ongoing, Progressing     Problem: Skin Injury Risk Increased  Goal: Skin Health and Integrity  Outcome: Ongoing, Progressing     Problem: Fall Injury Risk  Goal: Absence of Fall and Fall-Related Injury  Outcome: Ongoing, Progressing

## 2023-01-12 NOTE — PROGRESS NOTES
[facility]                       [address] [id number]    PATIENT NAME:       MISHA GLORIA  YOB: 1934  CSN:                [id number]   MRN:                [id number]  ADMIT DATE:         01/10/2023 15:46:00  PHYSICIAN:          Antony Payton, DO                            PROGRESS NOTE    DATE:  01/11/2023 00:00:00    SUBJECTIVE:  The patient was propped up in bed this evening, finally adding that   her appetite is improving and she is eating dinner.  She stated she had a   rather large dinner tonight.  She denies any chest discomfort, shortness of   breath and feels that her energy level is improving.  She seems to be tolerating   initial medical therapies.    OBJECTIVE:  VITAL SIGNS:  Today shows blood pressure 101/69 and a heart rate 86   beats per minute. She is afebrile, oxygen saturation 100% on room air.    GENERAL:  Remains essentially unchanged and is clinically stable.  HEART:  She   does have regular heart tones.    LUNGS:  Clear lung fields.    LABORATORY DATA:  Urinalysis shows 3+ leukocytes and 1+ bacteria.  Nitrites   negative.  White cell count 7600, hemoglobin 11.6, hematocrit 39.5, platelet   count is 253,000.  Sodium is 137, potassium 4.8, chloride 100, CO2 noted, BUN is   73.7, creatinine 2.51, and cardiac enzymes are within normal limits.    RECOMMENDATIONS:  As outlined in the consultation note from 01/10/2023. The   patient's condition has remained essentially stable from a cardiac standpoint.    Last echocardiogram in December 2022, showed mild cardiomyopathy with LVEF in   the range of 40-45% and a stable bioprosthetic aortic valve.  Renal function   seems to be improving with the gentle hydration.  Her appetite also seems to be   improving.  I agree with the current plan of care.  I will continue to monitor   her progress closely with you and further recommendations  forthcoming.        ______________________________  DO TUNDE Alicea/TOMER  DD:  01/11/2023  Time:  11:13PM  DT:  01/12/2023  Time:  12:14AM  Job #:  [id number]      PROGRESS NOTE

## 2023-01-13 NOTE — PROGRESS NOTES
"[facility] Hospital Medicine Progress Note        Chief Complaint: Inpatient Follow-up for RENÉ, weakness    HPI: 88 y.o. female patient of Dr Tate; she has a past medical history of Combined systolic and diastolic heart failure, A-fib, CAD (coronary artery disease), CVA (cerebrovascular accident), Dyspnea on exertion, GERD (gastroesophageal reflux disease), HTN (hypertension), Mixed hyperlipidemia, Renal disorder, S/P AVR (aortic valve replacement), Synovial cyst of knee, TIA (transient ischemic attack), Unspecified hemorrhoids, and Unspecified osteoarthritis, unspecified site.  Patient previously seen 12/19/22 for hospital discharge for fluid overload.  At the time, noted to have been discharged on torsemide and had her Diamox discontinued.     She presented to the office on 1-10-22 sitting in wheelchair accompanied by her  with complaints worsening fatigue/ weakness.  Onset within the last 7 days.  Has been suffering from some bouts of nausea with inability to drink much fluids.  However, now feels "too weak to raise glass of water to drink".  Blood pressure in office 88/60 with repeat manual 83/61.  Recently completed blood work with noted BUN 80.9 (01/09/23) worsened from 57 (12/16/22),  creatinine 2.97 worsened from 1.67.       Cardiologist: Dr. Payton   GI: Dr. Nunez  Renal: Dr. Almeida     She is seen on the morning following admission sitting up in bed eating some breakfast this morning he drank her juice and drink her coffee in his eating some grits.  She states she does not want to look at eggs because they eat them at home every morning.  BUN this morning of 73 and creatinine of 2.51 calcium of 10.6.  Nephrology service is following who have started gentle IV fluid resuscitation.  Patient reports just feeling generally weak.     Initial troponin elevated at 0.072 with repeat of 0.054.  Patient denies any chest pain or shortness of breath.  EKG reviewed with " ventricular rate of 86 beats per minute, first-degree AV block, regular rhythm, normal MO intervals and normal ST segment.  Echocardiogram from December 2022 with EF of 40-45% with severe right ventricular enlargement, bioprosthetic aortic valve present with mild central aortic insufficiency.  Gradient of 10 mmHg, moderate TR.  PAP pressure of 60.       Interval Hx: Gram-negative rods, presumed Proteus.  Sensitivities pending.  BUN of 60 creatinine of 2.13 this morning.  Overall patient states she is feeling okay just still generally weak but feels like she is getting longer each.      Objective/physical exam:  General: In no acute distress, afebrile  Chest: Clear to auscultation bilaterally  Heart: RRR, +S1, S2, no appreciable murmur  Abdomen: Soft, nontender, BS +  MSK: Warm, no lower extremity edema, no clubbing or cyanosis  Neurologic: Alert and oriented x4, Cranial nerve II-XII intact, Strength 5/5 in all 4 extremities    VITAL SIGNS: 24 HRS MIN & MAX LAST   Temp  Min: 97.5 °F (36.4 °C)  Max: 98.1 °F (36.7 °C) 97.5 °F (36.4 °C)   BP  Min: 83/52  Max: 104/70 100/69   Pulse  Min: 78  Max: 91  91   Resp  Min: 16  Max: 18 16   SpO2  Min: 95 %  Max: 98 % 95 %       Recent Labs   Lab 01/09/23  0900 01/10/23  1553 01/11/23 0413   WBC 6.4 6.3 7.6   RBC 5.69* 5.55* 4.96   HGB 13.0 12.7 11.6*   HCT 45.1 44.7 39.5   MCV 79.3* 80.5 79.6*   MCH 22.8 22.9 23.4   MCHC 28.8* 28.4* 29.4*   * 317 253       Recent Labs   Lab 01/10/23  1553 01/10/23  2239 01/11/23  0413 01/12/23  0327   * 135* 137 137   K 6.3* 4.1 4.8 4.2   CO2 30 26 28 28   BUN 85.2* 68.9* 73.7* 60.3*   CREATININE 3.13* 2.50* 2.51* 2.13*   CALCIUM 11.0* 9.7 10.6* 9.3   MG 2.50  --  2.00 1.80   ALBUMIN 3.4 2.5* 3.0* 2.5*   ALKPHOS 94 73 85 79   ALT 13 10 12 9   AST 50* 38* 48* 40*   BILITOT 0.9 0.8 0.9 0.8          Microbiology Results (last 7 days)       Procedure Component Value Units Date/Time    Urine culture [[id number]]  (Abnormal) Collected:  01/11/23 0641    Order Status: Completed Specimen: Urine Updated: 01/13/23 0824     Urine Culture >/= 100,000 colonies/ml Gram-negative Rods      >/= 100,000 colonies/ml Presumptive proteus species             See below for Radiology    Scheduled Med:   atorvastatin  40 mg Oral Daily    cefTRIAXone (ROCEPHIN) IVPB  1 g Intravenous Q24H    clopidogreL  75 mg Oral Daily    docusate sodium  100 mg Oral BID    enoxaparin  1 mg/kg Subcutaneous Q24H    EScitalopram oxalate  10 mg Oral Daily    levothyroxine  50 mcg Oral Before breakfast    pantoprazole  40 mg Oral BID    ranolazine  500 mg Oral BID        Continuous Infusions:   sodium chloride 0.9% 50 mL/hr at 01/13/23 0529        PRN Meds:  acetaminophen, melatonin, sodium chloride 0.9%       Assessment/Plan:  Patient Active Problem List   Diagnosis    Peripheral vascular disease, unspecified    Stage 3a chronic kidney disease    HTN (hypertension)    S/P AVR (aortic valve replacement)    Valvular heart disease    Acute kidney injury superimposed on CKD    Hypothyroidism, unspecified    Acute on chronic combined systolic and diastolic heart failure    Weakness          Rocephin day 2 for UTI with Gram-negative rods awaiting final identification.  Renal indices improving  NS @50  Continued PT and OT services.  Dietary supplements TID  Hopeful DC tomorrow    Patient condition:  Stable        All diagnosis and differential diagnosis have been reviewed; assessment and plan has been documented; I have personally reviewed the labs and test results that are presently available; I have reviewed the patients medication list; I have reviewed the consulting providers response and recommendations. I have reviewed or attempted to review medical records based upon their availability    All of the patient's questions have been  addressed and answered. Patient's is agreeable to the above stated plan. I will continue to monitor closely and make adjustments to medical management as  needed.      Nutrition Status:      X-Ray Chest AP Portable  Narrative: EXAMINATION:  XR CHEST AP PORTABLE    CLINICAL HISTORY:  weakness;    COMPARISON:  11 December 2022    FINDINGS:  Portable frontal view of the chest was obtained. Heart and mediastinum are unchanged.  Similar appearance of chronic interstitial changes of the lungs with no new focal consolidation.  No pneumothorax.  Impression: No acute findings.  Similar appearance of the chest.    Electronically signed by: David Parker  Date:    01/10/2023  Time:    20:10      Momo Rodriguez MD   01/13/2023

## 2023-01-13 NOTE — PROGRESS NOTES
[facility] Nephrology Inpatient Progress Note      HPI:     Patient Name: Dee Haji  Admission Date: 1/10/2023  Hospital Length of Stay: 3 days  Code Status: DNR   Attending Physician: Momo Rodriguez, *   Primary Care Physician: Tierra Johnson MD  Principal Problem:Acute kidney injury superimposed on CKD      Today patient seen and examined  Labs, recent events, imaging and medications reviewed for this hospital stay  Feels okay; reports overall feeling better but still weak when walking; she is working with PT  Good urine output    Review of Systems:   Constitutional: Denies fever, fatigue, night sweats; +generalized weakness  Skin: Denies wounds, no rashes, no itching, no new skin lesions  HEENT: Denies acute change in hearing or vision, tinnitus, or dysphagia  Respiratory:  Denies cough or wheezing; +occ mild SOB, not worse per pt  Cardiovascular: Denies chest pain, palpitations, or swelling  Gastrointestional: Denies abdominal pain, nausea, vomiting, diarrhea, or constipation; appetite okay  Genitourinary: Denies dysuria, hematuria, or incontinence; reports able to empty bladder  Musculoskeletal: Denies myalgias, back pain, flank pain; +left leg more weak than right  Neurological: Denies headaches, seizures, dizziness, paresthesias or asterixis  Hematological: Denies unusual bruising or bleeding  Psychiatric: Denies hallucinations, depression, or confusion      Medications:   Scheduled Meds:   atorvastatin  40 mg Oral Daily    cefTRIAXone (ROCEPHIN) IVPB  1 g Intravenous Q24H    clopidogreL  75 mg Oral Daily    docusate sodium  100 mg Oral BID    enoxaparin  1 mg/kg Subcutaneous Q24H    EScitalopram oxalate  10 mg Oral Daily    levothyroxine  50 mcg Oral Before breakfast    pantoprazole  40 mg Oral BID    ranolazine  500 mg Oral BID     Continuous Infusions:   sodium chloride 0.9% 50 mL/hr at 01/13/23 0529         Vitals:     Vitals:    01/12/23 1900 01/12/23 2314 01/13/23 0323 01/13/23 0711    BP: 99/64 (!) 101/57 104/70 100/69   Pulse: 86 89 88 91   Resp: 18  18 16   Temp: 97.9 °F (36.6 °C) 97.9 °F (36.6 °C) 97.9 °F (36.6 °C) 97.5 °F (36.4 °C)   TempSrc: Oral Oral Oral Oral   SpO2: 97% 96% 95% 95%   Weight:       Height:             I/O last 3 completed shifts:  In: 1530 [P.O.:980; I.V.:550]  Out: -     Intake/Output Summary (Last 24 hours) at 1/13/2023 0859  Last data filed at 1/12/2023 1847  Gross per 24 hour   Intake 980 ml   Output --   Net 980 ml         Physical Exam:   General: no acute distress, awake, alert  Eyes: PERRLA, EOMI, conjunctiva clear, eyelids without swelling  HENT: atraumatic, oropharynx and nasal mucosa patent  Neck: full ROM, +JVD, no thyromegaly or lymphadenopathy  Respiratory: equal, unlabored, diminished at bases  Cardiovascular: RRR +RANDALL g3/6 LSB; BL radial and pedal pulses felt  Edema: none  Gastrointestinal: soft, non-tender, non-distended; positive bowel sounds; no masses to palpation  Musculoskeletal: weakness low ext  Integumentary: warm, dry; no rashes, wounds, or new skin lesions  Neurological: oriented, appropriate, no acute deficits        Labs:     Chemistries:   Recent Labs   Lab 01/09/23  0900 01/10/23  1553 01/10/23  2239 01/11/23  0413 01/12/23  0327    135* 135* 137 137   K 4.8 6.3* 4.1 4.8 4.2   CO2 31 30 26 28 28   BUN 80.9* 85.2* 68.9* 73.7* 60.3*   CREATININE 2.97* 3.13* 2.50* 2.51* 2.13*   CALCIUM 10.9* 11.0* 9.7 10.6* 9.3   BILITOT 1.2 0.9 0.8 0.9 0.8   ALKPHOS 109 94 73 85 79   ALT 17 13 10 12 9   AST 53* 50* 38* 48* 40*   GLUCOSE 105 109 86 82 95   MG 2.30 2.50  --  2.00 1.80   PHOS 3.9  --   --  3.3  --           CBC/Anemia Labs: Coags:    Recent Labs   Lab 01/09/23  0900 01/10/23  1553 01/11/23  0413   WBC 6.4 6.3 7.6   HGB 13.0 12.7 11.6*   HCT 45.1 44.7 39.5   * 317 253   MCV 79.3* 80.5 79.6*      Recent Labs   Lab 01/10/23  1553   INR 1.07            Impression:     Patient Active Problem List   Diagnosis    Peripheral vascular  disease, unspecified    Stage 3a chronic kidney disease    HTN (hypertension)    S/P AVR (aortic valve replacement)    Valvular heart disease    Acute kidney injury superimposed on CKD    Hypothyroidism, unspecified    Acute on chronic combined systolic and diastolic heart failure    Weakness     RENÉ secondary to Prerenal azotemia/early ATN---->recovering; no new labs today  Severe cardiorenal disease      Plan:     Cont IVF at 50cc/hour  AB for UTI  If pt Dc'd will need FU within 2 weeks at our office  (Needs to be Dc'd on torsemide 20 mg PO daily and No aldactone(      Gianni Payton , FNP

## 2023-01-13 NOTE — PT/OT/SLP PROGRESS
Occupational Therapy   Treatment    Name: Dee Haji  MRN: [id number]  Admitting Diagnosis:  Acute kidney injury superimposed on CKD       Recommendations:     Discharge Recommendations: home with home health  Discharge Equipment Recommendations:  none  Barriers to discharge:  None    Assessment:     Dee Haji is a 88 y.o. female with a medical diagnosis of Acute kidney injury superimposed on CKD.  She presents with improvement in strength and tolerance today, feeling much better. Performance deficits affecting function are weakness, impaired endurance, impaired self care skills, impaired functional mobility.     Rehab Prognosis:  Good; patient would benefit from acute skilled OT services to address these deficits and reach maximum level of function.       Plan:     Patient to be seen 3 x/week, 5 x/week to address the above listed problems via self-care/home management, therapeutic activities, therapeutic exercises  Plan of Care Expires: 02/08/23  Plan of Care Reviewed with: patient, spouse    Subjective     Pain/Comfort:  Pain Rating 1:  (c/o some R shoulder pain but no number noted)    Objective:     Communicated with: nsg prior to session.  Patient found up in chair with peripheral IV upon OT entry to room.    General Precautions: Standard, fall    Orthopedic Precautions:   Braces:    Respiratory Status: Room air     Occupational Performance:     Bed Mobility:     Sup to sit with min assist    Functional Mobility/Transfers:  Patient completed Sit <> Stand Transfer with minimum assistance  with  rolling walker   Functional Mobility: ambulated to BR with RW with CGA  Toilet transfer with min assist with grab bar  Stood x ~10 min to perform grooming standing at sink    Activities of Daily Living:  Toilet hygiene standing with total assist  Washed face, brushed teeth standing at sink  with SBA       AMPAC 6 Click ADL:      Treatment & Education:  POC, importance of OOB    Patient left up in chair with all  lines intact, call button in reach, and  present    GOALS:   Multidisciplinary Problems       Occupational Therapy Goals          Problem: Occupational Therapy    Goal Priority Disciplines Outcome Interventions   Occupational Therapy Goal     OT, PT/OT Ongoing, Progressing    Description: Goals to be met by: 2/8/23     Patient will increase functional independence with ADLs by performing:    LE Dressing with Modified Herrick.  Grooming while standing with Modified Herrick.  Toileting from toilet with Modified Herrick for hygiene and clothing management.   Toilet transfer to toilet with Modified Herrick.                         Time Tracking:     OT Date of Treatment: 01/13/23  OT Start Time: 1020  OT Stop Time: 1050  OT Total Time (min): 30 min    Billable Minutes:Self care mgmt 30 min    OT/CORNELIUS: OT     CORNELIUS Visit Number: 2    1/13/2023

## 2023-01-13 NOTE — PLAN OF CARE
Problem: Adult Inpatient Plan of Care  Goal: Plan of Care Review  Outcome: Ongoing, Progressing  Flowsheets (Taken 1/13/2023 0025)  Plan of Care Reviewed With: patient  Goal: Patient-Specific Goal (Individualized)  Outcome: Ongoing, Progressing  Goal: Absence of Hospital-Acquired Illness or Injury  Outcome: Ongoing, Progressing  Intervention: Identify and Manage Fall Risk  Flowsheets (Taken 1/13/2023 0025)  Safety Promotion/Fall Prevention:   assistive device/personal item within reach   bedside commode chair   Fall Risk reviewed with patient/family   Fall Risk signage in place   medications reviewed   /camera at bedside   side rails raised x 2   lighting adjusted  Intervention: Prevent Skin Injury  Flowsheets (Taken 1/13/2023 0025)  Body Position:   supine   upper extremity elevated  Skin Protection:   protective footwear used   tubing/devices free from skin contact  Intervention: Prevent and Manage VTE (Venous Thromboembolism) Risk  Flowsheets (Taken 1/13/2023 0025)  Activity Management: Up to bedside commode - L3  VTE Prevention/Management:   ambulation promoted   bleeding precations maintained   bleeding risk assessed  Range of Motion: active ROM (range of motion) encouraged  Intervention: Prevent Infection  Flowsheets (Taken 1/13/2023 0025)  Infection Prevention:   environmental surveillance performed   equipment surfaces disinfected   hand hygiene promoted   personal protective equipment utilized   visitors restricted/screened  Goal: Optimal Comfort and Wellbeing  Outcome: Ongoing, Progressing  Intervention: Monitor Pain and Promote Comfort  Flowsheets (Taken 1/13/2023 0025)  Pain Management Interventions:   care clustered   position adjusted   quiet environment facilitated  Intervention: Provide Person-Centered Care  Flowsheets (Taken 1/13/2023 0025)  Trust Relationship/Rapport:   care explained   emotional support provided   empathic listening provided   choices provided   questions  answered   thoughts/feelings acknowledged  Goal: Readiness for Transition of Care  Outcome: Ongoing, Progressing     Problem: Fluid and Electrolyte Imbalance (Acute Kidney Injury/Impairment)  Goal: Fluid and Electrolyte Balance  Outcome: Ongoing, Progressing     Problem: Oral Intake Inadequate (Acute Kidney Injury/Impairment)  Goal: Optimal Nutrition Intake  Outcome: Ongoing, Progressing     Problem: Renal Function Impairment (Acute Kidney Injury/Impairment)  Goal: Effective Renal Function  Outcome: Ongoing, Progressing     Problem: Impaired Wound Healing  Goal: Optimal Wound Healing  Outcome: Ongoing, Progressing  Intervention: Promote Wound Healing  Flowsheets (Taken 1/13/2023 0025)  Sleep/Rest Enhancement:   awakenings minimized   consistent schedule promoted   regular sleep/rest pattern promoted  Activity Management: Up to bedside commode - L3  Pain Management Interventions:   care clustered   position adjusted   quiet environment facilitated     Problem: Skin Injury Risk Increased  Goal: Skin Health and Integrity  Outcome: Ongoing, Progressing     Problem: Fall Injury Risk  Goal: Absence of Fall and Fall-Related Injury  Outcome: Ongoing, Progressing  Intervention: Identify and Manage Contributors  Flowsheets (Taken 1/13/2023 0025)  Self-Care Promotion:   independence encouraged   BADL personal objects within reach  Medication Review/Management: medications reviewed  Intervention: Promote Injury-Free Environment  Flowsheets (Taken 1/13/2023 0025)  Safety Promotion/Fall Prevention:   assistive device/personal item within reach   bedside commode chair   Fall Risk reviewed with patient/family   Fall Risk signage in place   medications reviewed   /camera at bedside   side rails raised x 2   lighting adjusted

## 2023-01-13 NOTE — PT/OT/SLP PROGRESS
Physical Therapy Treatment    Patient Name:  Dee Haji   MRN:  [id number]    Recommendations:     Discharge Recommendations: home with home health  Discharge Equipment Recommendations: none  Barriers to discharge: None    Assessment:     Dee Haji is a 88 y.o. female admitted with a medical diagnosis of Acute kidney injury superimposed on CKD.  She presents with the following impairments/functional limitations: weakness, impaired endurance .    Rehab Prognosis: Good; patient would benefit from acute skilled PT services to address these deficits and reach maximum level of function.    Recent Surgery: * No surgery found *      Plan:     During this hospitalization, patient to be seen 5 x/week to address the identified rehab impairments via gait training, therapeutic exercises, therapeutic activities and progress toward the following goals:    Plan of Care Expires:  23    Subjective     Chief Complaint: fatigue  Patient/Family Comments/goals:   Pain/Comfort:         Objective:     Communicated with RN prior to session.  Patient found up in chair with   upon PT entry to room.     General Precautions: Standard, fall  Orthopedic Precautions: N/A  Braces: N/A  Respiratory Status: Room air     Functional Mobility:  Transfers:     Sit to Stand:  stand by assistance with rolling walker  Gait: Pt amb around room 3X SBA with RW.      AM-PAC 6 CLICK MOBILITY          Treatment & Education:  Performed LE therex UIC, LAQ, Heel raises, seated marches, abd, 10x1    Patient left up in chair with all lines intact, call button in reach, and  present..    GOALS:   Multidisciplinary Problems       Physical Therapy Goals          Problem: Physical Therapy    Goal Priority Disciplines Outcome Goal Variances Interventions   Physical Therapy Goal     PT, PT/OT Ongoing, Progressing     Description: Goals to be met by: 23     Patient will increase functional independence with mobility by performin. Supine  to sit with Modified Kay  2. Sit to supine with Modified Kay  3. Sit to stand transfer with Modified Kay  4. Bed to chair transfer with Modified Kay using Rolling Walker  5. Gait  x 200 feet with Stand-by Assistance using Rolling Walker.                          Time Tracking:     PT Received On: 01/13/23  PT Start Time: 1256     PT Stop Time: 1312  PT Total Time (min): 16 min     Billable Minutes: Gait Training 16    Treatment Type: Treatment  PT/PTA: PTA     PTA Visit Number: 2     01/13/2023

## 2023-01-14 NOTE — PROGRESS NOTES
[facility]  [facility]  Progress Note    Patient Name: Dee Haji  MRN: [id number]  Patient Class: IP- Inpatient   Admission Date: 1/10/2023  Length of Stay: 4 days  Attending Physician: Momo Rodriguez, *  Primary Care Provider: Tierra Johnson MD        Subjective:     Principal Problem:Acute kidney injury superimposed on CKD        HPI:  No notes on file    Overview/Hospital Course:  No notes on file    Interval History:  Patient was admitted with worsening malaise and fatigue over the last week or so.  She is been having some issues with nausea she was too weak to even raise her glass of water to drink.  Blood pressures were hypotensive today she is sitting in a chair she seems to be doing little bit better blood pressures are stable she had a serum creatinine of 2-1/2.  Nephrology is on the case cardiologist on the case as well.  Her initial cardiac troponins were low elevated in her start to trend down.  She is asking for lunch  Labs are stable her potassium is elevated at 6.3 with a serum creatinine today at 3.13  Rest of vitals are stable she is afebrile  She is currently on Rocephin day 3 for Gram-negative rods for UTI.  She grew out E coli and also Proteus which were both susceptible to Rocephin.    Review of Systems   Constitutional: Negative.  Negative for chills and fever.   HENT: Negative.     Eyes: Negative.    Respiratory: Negative.  Negative for cough and shortness of breath.    Cardiovascular: Negative.  Negative for chest pain, palpitations and leg swelling.   Gastrointestinal: Negative.  Negative for abdominal distention, abdominal pain, constipation, diarrhea, nausea and vomiting.   Endocrine: Negative.    Genitourinary: Negative.  Negative for dysuria and hematuria.   Musculoskeletal: Negative.  Negative for arthralgias, back pain and joint swelling.   Skin: Negative.  Negative for rash.   Allergic/Immunologic: Negative.    Neurological:  Negative.  Negative for dizziness, seizures and headaches.   Hematological: Negative.    Psychiatric/Behavioral: Negative.     Objective:     Vital Signs (Most Recent):  Temp: 98.7 °F (37.1 °C) (01/14/23 1528)  Pulse: 72 (01/14/23 1528)  Resp: 19 (01/14/23 1528)  BP: 107/62 (01/14/23 1528)  SpO2: 96 % (01/14/23 1528) Vital Signs (24h Range):  Temp:  [97.8 °F (36.6 °C)-98.7 °F (37.1 °C)] 98.7 °F (37.1 °C)  Pulse:  [72-97] 72  Resp:  [18-19] 19  SpO2:  [96 %-98 %] 96 %  BP: (107-125)/(56-84) 107/62     Weight: 39.2 kg (86 lb 6.7 oz)  Body mass index is 16.88 kg/m².    Intake/Output Summary (Last 24 hours) at 1/14/2023 1658  Last data filed at 1/14/2023 1507  Gross per 24 hour   Intake 2365 ml   Output 800 ml   Net 1565 ml      Physical Exam  Eyes:      Pupils: Pupils are equal, round, and reactive to light.   Cardiovascular:      Rate and Rhythm: Normal rate.      Pulses: Normal pulses.      Heart sounds: No murmur heard.  Pulmonary:      Effort: Pulmonary effort is normal.      Breath sounds: Normal breath sounds.   Abdominal:      General: Abdomen is flat. Bowel sounds are normal. There is no distension.      Palpations: Abdomen is soft.      Tenderness: There is no guarding.   Musculoskeletal:         General: Normal range of motion.      Cervical back: Normal range of motion and neck supple.   Skin:     General: Skin is warm.   Neurological:      General: No focal deficit present.      Mental Status: She is alert.   Psychiatric:         Mood and Affect: Mood normal.         Behavior: Behavior normal.       Significant Labs: All pertinent labs within the past 24 hours have been reviewed.  Recent Lab Results         01/14/23  0405        Albumin/Globulin Ratio 0.9       Albumin 2.5       Alkaline Phosphatase 87       ALT 13       AST 42       Baso # 0.06       Basophil % 0.9       BILIRUBIN TOTAL 0.6       BUN 33.9       Calcium 9.1       Chloride 108       CO2 25       Creatinine 1.23       eGFR 42       Eos #  0.35       Eosinophil % 5.5       Globulin, Total 2.9       Glucose 97       Hematocrit 35.9       Hemoglobin 10.4       Immature Grans (Abs) 0.02       Immature Granulocytes 0.3       Lymph # 1.13       LYMPH % 17.7       MCH 23.6       MCHC 29.0       MCV 81.6       Mono # 0.43       Mono % 6.7       MPV --  Comment: N/a       Neut # 4.41       Neut % 68.9       nRBC 0.0       Platelets 263       Potassium 3.6       PROTEIN TOTAL 5.4       RBC 4.40       RDW --  Comment: N/a       Sodium 141       WBC 6.4               Significant Imaging: I have reviewed all pertinent imaging results/findings within the past 24 hours.      Assessment/Plan:      * Acute kidney injury superimposed on CKD  Serum creatinine did elevate 3.13 nephrology is on board      Weakness  Multifactorial seem to be improving      Acute on chronic combined systolic and diastolic heart failure  Currently compensated continue current therapy      S/P AVR (aortic valve replacement)  Stable no shortness breath cardiology is on board      Stage 3a chronic kidney disease  Nephrology is on board        VTE Risk Mitigation (From admission, onward)         Ordered     enoxaparin injection 40 mg  Every 24 hours (non-standard times)         01/11/23 0855     IP VTE HIGH RISK PATIENT  Once         01/10/23 1902     Place sequential compression device  Until discontinued         01/10/23 1902                Discharge Planning   JOO:      Code Status: DNR   Is the patient medically ready for discharge?:     Reason for patient still in hospital (select all that apply): Treatment  Discharge Plan A: Home with family                  CHRISTA DE LA VEGA MD  Department of Hospital Medicine   [facility] - [facility]

## 2023-01-14 NOTE — PLAN OF CARE
Problem: Adult Inpatient Plan of Care  Goal: Plan of Care Review  Outcome: Ongoing, Progressing  Flowsheets (Taken 1/13/2023 2000)  Plan of Care Reviewed With: patient  Goal: Patient-Specific Goal (Individualized)  Outcome: Ongoing, Progressing  Goal: Absence of Hospital-Acquired Illness or Injury  Outcome: Ongoing, Progressing  Intervention: Identify and Manage Fall Risk  Flowsheets (Taken 1/13/2023 2000)  Safety Promotion/Fall Prevention:   assistive device/personal item within reach   nonskid shoes/socks when out of bed  Intervention: Prevent Skin Injury  Flowsheets (Taken 1/13/2023 2000)  Body Position: position changed independently  Skin Protection:   adhesive use limited   incontinence pads utilized  Intervention: Prevent and Manage VTE (Venous Thromboembolism) Risk  Flowsheets (Taken 1/13/2023 2000)  Activity Management: Up to bedside commode - L3  VTE Prevention/Management: ambulation promoted  Range of Motion: active ROM (range of motion) encouraged  Intervention: Prevent Infection  Flowsheets (Taken 1/13/2023 2000)  Infection Prevention: hand hygiene promoted  Goal: Optimal Comfort and Wellbeing  Outcome: Ongoing, Progressing  Intervention: Monitor Pain and Promote Comfort  Flowsheets (Taken 1/13/2023 2000)  Pain Management Interventions:   care clustered   position adjusted   pillow support provided  Intervention: Provide Person-Centered Care  Flowsheets (Taken 1/13/2023 2000)  Trust Relationship/Rapport: care explained  Goal: Readiness for Transition of Care  Outcome: Ongoing, Progressing     Problem: Fluid and Electrolyte Imbalance (Acute Kidney Injury/Impairment)  Goal: Fluid and Electrolyte Balance  Outcome: Ongoing, Progressing     Problem: Oral Intake Inadequate (Acute Kidney Injury/Impairment)  Goal: Optimal Nutrition Intake  Outcome: Ongoing, Progressing     Problem: Impaired Wound Healing  Goal: Optimal Wound Healing  Outcome: Ongoing, Progressing     Problem: Skin Injury Risk Increased  Goal:  Skin Health and Integrity  Outcome: Ongoing, Progressing  Intervention: Optimize Skin Protection  Flowsheets (Taken 1/13/2023 2000)  Pressure Reduction Techniques: weight shift assistance provided  Pressure Reduction Devices: positioning supports utilized  Skin Protection:   adhesive use limited   incontinence pads utilized  Head of Bed (HOB) Positioning: HOB at 30-45 degrees  Intervention: Promote and Optimize Oral Intake  Flowsheets (Taken 1/13/2023 2000)  Oral Nutrition Promotion: rest periods promoted     Problem: Fall Injury Risk  Goal: Absence of Fall and Fall-Related Injury  Outcome: Ongoing, Progressing  Intervention: Identify and Manage Contributors  Flowsheets (Taken 1/13/2023 2000)  Self-Care Promotion:   BADL personal objects within reach   independence encouraged  Medication Review/Management:   medications reviewed   high-risk medications identified  Intervention: Promote Injury-Free Environment  Flowsheets (Taken 1/13/2023 2000)  Safety Promotion/Fall Prevention:   assistive device/personal item within reach   nonskid shoes/socks when out of bed

## 2023-01-14 NOTE — PLAN OF CARE
Problem: Adult Inpatient Plan of Care  Goal: Plan of Care Review  1/14/2023 1446 by Elgin Menon RN  Outcome: Ongoing, Progressing  1/14/2023 0752 by Elgin Menon RN  Outcome: Ongoing, Progressing  Goal: Patient-Specific Goal (Individualized)  1/14/2023 1446 by Elgin Menon RN  Outcome: Ongoing, Progressing  1/14/2023 0752 by Elgin Menon RN  Outcome: Ongoing, Progressing  Goal: Absence of Hospital-Acquired Illness or Injury  1/14/2023 1446 by Elgin Menon RN  Outcome: Ongoing, Progressing  1/14/2023 0752 by Elgin Menon RN  Outcome: Ongoing, Progressing  Goal: Optimal Comfort and Wellbeing  1/14/2023 1446 by Elgin Menon RN  Outcome: Ongoing, Progressing  1/14/2023 0752 by Elgin Menon RN  Outcome: Ongoing, Progressing  Goal: Readiness for Transition of Care  1/14/2023 1446 by Elgin Menon RN  Outcome: Ongoing, Progressing  1/14/2023 0752 by Elgin Menon RN  Outcome: Ongoing, Progressing     Problem: Fluid and Electrolyte Imbalance (Acute Kidney Injury/Impairment)  Goal: Fluid and Electrolyte Balance  1/14/2023 1446 by Elgin Menon RN  Outcome: Ongoing, Progressing  1/14/2023 0752 by Elgin Menon RN  Outcome: Ongoing, Progressing     Problem: Oral Intake Inadequate (Acute Kidney Injury/Impairment)  Goal: Optimal Nutrition Intake  1/14/2023 1446 by Elgin Menon RN  Outcome: Ongoing, Progressing  1/14/2023 0752 by Elgin Menon RN  Outcome: Ongoing, Progressing     Problem: Renal Function Impairment (Acute Kidney Injury/Impairment)  Goal: Effective Renal Function  1/14/2023 1446 by Elgin Menon RN  Outcome: Ongoing, Progressing  1/14/2023 0752 by Elgin Menon RN  Outcome: Ongoing, Progressing     Problem: Impaired Wound Healing  Goal: Optimal Wound Healing  1/14/2023 1446 by Elgin Menon RN  Outcome: Ongoing, Progressing  1/14/2023 0752 by Elgin Menon RN  Outcome: Ongoing, Progressing     Problem: Skin Injury Risk Increased  Goal: Skin Health and  Integrity  1/14/2023 1446 by Elgin Menon RN  Outcome: Ongoing, Progressing  1/14/2023 0752 by Elgin Menon RN  Outcome: Ongoing, Progressing     Problem: Fall Injury Risk  Goal: Absence of Fall and Fall-Related Injury  1/14/2023 1446 by Elgin Menon RN  Outcome: Ongoing, Progressing  1/14/2023 0752 by Elgin Menon RN  Outcome: Ongoing, Progressing

## 2023-01-14 NOTE — PROGRESS NOTES
[facility] Inpatient Progress Note      HPI:     Patient Name: Dee Haji  Admission Date: 1/10/2023  Hospital Length of Stay: 4 days  Code Status: DNR   Attending Physician: Momo Rodriguez, *   Primary Care Physician: Tierra Johnson MD  Principal Problem:Acute kidney injury superimposed on CKD      Today patient seen and examined  Labs, recent events, imaging and medications reviewed for this hospital stay  Feels ok  No increase in dyspnea  No NV  Good UO    Review of Systems:   Constitutional: Denies fever, fatigue, generalized weakness, night sweats, or acute weight change  Skin: Denies wounds, no rashes, no itching, no new skin lesions  HEENT: Denies acute change in hearing or vision, tinnitus, or dysphagia  Respiratory:  Denies cough, worsening shortness of breath, or wheezing  Cardiovascular: Denies chest pain, palpitations, or swelling  Gastrointestional: Denies abdominal pain, nausea, vomiting, diarrhea, or constipation  Genitourinary: Denies dysuria, hematuria, or incontinence; reports able to empty bladder  Musculoskeletal: Denies myalgias, back pain, flank pain, new decreased ROM or acute focal weakness  Neurological: Denies headaches, seizures, dizziness, paresthesias or asterixis  Hematological: Denies unusual bruising or bleeding  Psychiatric: Denies hallucinations, depression, or confusion      Medications:   Scheduled Meds:   atorvastatin  40 mg Oral Daily    cefTRIAXone (ROCEPHIN) IVPB  1 g Intravenous Q24H    clopidogreL  75 mg Oral Daily    docusate sodium  100 mg Oral BID    enoxaparin  1 mg/kg Subcutaneous Q24H    EScitalopram oxalate  10 mg Oral Daily    levothyroxine  50 mcg Oral Before breakfast    pantoprazole  40 mg Oral BID    ranolazine  500 mg Oral BID     Continuous Infusions:      Vitals:     Vitals:    01/13/23 1924 01/13/23 2335 01/14/23 0346 01/14/23 0745   BP: 122/63 124/84 121/64 125/72   Pulse: 97 80 77 88   Resp: 18   18   Temp: 98 °F (36.7 °C) 97.9 °F  (36.6 °C) 97.9 °F (36.6 °C) 97.8 °F (36.6 °C)   TempSrc: Oral   Oral   SpO2: 96% 96% 97% 96%   Weight:       Height:             I/O last 3 completed shifts:  In: 3487.5 [P.O.:1070; I.V.:2417.5]  Out: 600 [Urine:600]    Intake/Output Summary (Last 24 hours) at 1/14/2023 1037  Last data filed at 1/14/2023 0620  Gross per 24 hour   Intake 2125 ml   Output 600 ml   Net 1525 ml       Physical Exam:   General: no acute distress, awake, alert  Eyes: PERRLA, EOMI, conjunctiva clear, eyelids without swelling  HENT: atraumatic, oropharynx and nasal mucosa patent  Neck: full ROM, ++JVD, no thyromegaly or lymphadenopathy  Respiratory: equal, fine rales at bases  Cardiovascular: RRR.++RANDALL G3/6 LSB BL radial and pedal pulses felt  Edema: none  Gastrointestinal: soft, non-tender, non-distended; positive bowel sounds; no masses to palpation  Musculoskeletal:  ROM without new  limitation or discomfort  Integumentary: warm, dry; no rashes, wounds, or skin lesions  Neurological: oriented, appropriate, no acute deficits        Labs:     Chemistries:   Recent Labs   Lab 01/09/23  0900 01/10/23  1553 01/10/23  2239 01/11/23  0413 01/12/23  0327 01/14/23  0405    135*   < > 137 137 141   K 4.8 6.3*   < > 4.8 4.2 3.6   CO2 31 30   < > 28 28 25   BUN 80.9* 85.2*   < > 73.7* 60.3* 33.9*   CREATININE 2.97* 3.13*   < > 2.51* 2.13* 1.23*   CALCIUM 10.9* 11.0*   < > 10.6* 9.3 9.1   BILITOT 1.2 0.9   < > 0.9 0.8 0.6   ALKPHOS 109 94   < > 85 79 87   ALT 17 13   < > 12 9 13   AST 53* 50*   < > 48* 40* 42*   GLUCOSE 105 109   < > 82 95 97   MG 2.30 2.50  --  2.00 1.80  --    PHOS 3.9  --   --  3.3  --   --     < > = values in this interval not displayed.        CBC/Anemia Labs: Coags:    Recent Labs   Lab 01/10/23  1553 01/11/23  0413 01/14/23  0405   WBC 6.3 7.6 6.4   HGB 12.7 11.6* 10.4*   HCT 44.7 39.5 35.9*    253 263   MCV 80.5 79.6* 81.6    Recent Labs   Lab 01/10/23  1553   INR 1.07          Impression:     Patient Active  Problem List   Diagnosis    Peripheral vascular disease, unspecified    Stage 3a chronic kidney disease    HTN (hypertension)    S/P AVR (aortic valve replacement)    Valvular heart disease    Acute kidney injury superimposed on CKD    Hypothyroidism, unspecified    Acute on chronic combined systolic and diastolic heart failure    Weakness   RENÉ ---> recovering to baseline  Cardiorenal disease  Volume status optimized    Plan:     DC IVF  Will consider resuming low dose diuretics accordingly  Pt will benefit from FREQUENT FU due to the severity of her cardiorenal disease       Antonio Almeida

## 2023-01-14 NOTE — NURSING
Per patient advised to have a diaper placed on her d/t the fear of urinating on self and or having a bowel movement. Patient gets up with assistance to bedside commode. Dressing at sacral area dry and intact.

## 2023-01-14 NOTE — SUBJECTIVE & OBJECTIVE
Interval History:  Patient was admitted with worsening malaise and fatigue over the last week or so.  She is been having some issues with nausea she was too weak to even raise her glass of water to drink.  Blood pressures were hypotensive today she is sitting in a chair she seems to be doing little bit better blood pressures are stable she had a serum creatinine of 2-1/2.  Nephrology is on the case cardiologist on the case as well.  Her initial cardiac troponins were low elevated in her start to trend down.  She is asking for lunch  Labs are stable her potassium is elevated at 6.3 with a serum creatinine today at 3.13  Rest of vitals are stable she is afebrile  She is currently on Rocephin day 3 for Gram-negative rods for UTI.  She grew out E coli and also Proteus which were both susceptible to Rocephin.    Review of Systems   Constitutional: Negative.  Negative for chills and fever.   HENT: Negative.     Eyes: Negative.    Respiratory: Negative.  Negative for cough and shortness of breath.    Cardiovascular: Negative.  Negative for chest pain, palpitations and leg swelling.   Gastrointestinal: Negative.  Negative for abdominal distention, abdominal pain, constipation, diarrhea, nausea and vomiting.   Endocrine: Negative.    Genitourinary: Negative.  Negative for dysuria and hematuria.   Musculoskeletal: Negative.  Negative for arthralgias, back pain and joint swelling.   Skin: Negative.  Negative for rash.   Allergic/Immunologic: Negative.    Neurological: Negative.  Negative for dizziness, seizures and headaches.   Hematological: Negative.    Psychiatric/Behavioral: Negative.     Objective:     Vital Signs (Most Recent):  Temp: 98.7 °F (37.1 °C) (01/14/23 1528)  Pulse: 72 (01/14/23 1528)  Resp: 19 (01/14/23 1528)  BP: 107/62 (01/14/23 1528)  SpO2: 96 % (01/14/23 1528) Vital Signs (24h Range):  Temp:  [97.8 °F (36.6 °C)-98.7 °F (37.1 °C)] 98.7 °F (37.1 °C)  Pulse:  [72-97] 72  Resp:  [18-19] 19  SpO2:  [96 %-98 %]  96 %  BP: (107-125)/(56-84) 107/62     Weight: 39.2 kg (86 lb 6.7 oz)  Body mass index is 16.88 kg/m².    Intake/Output Summary (Last 24 hours) at 1/14/2023 1658  Last data filed at 1/14/2023 1507  Gross per 24 hour   Intake 2365 ml   Output 800 ml   Net 1565 ml      Physical Exam  Eyes:      Pupils: Pupils are equal, round, and reactive to light.   Cardiovascular:      Rate and Rhythm: Normal rate.      Pulses: Normal pulses.      Heart sounds: No murmur heard.  Pulmonary:      Effort: Pulmonary effort is normal.      Breath sounds: Normal breath sounds.   Abdominal:      General: Abdomen is flat. Bowel sounds are normal. There is no distension.      Palpations: Abdomen is soft.      Tenderness: There is no guarding.   Musculoskeletal:         General: Normal range of motion.      Cervical back: Normal range of motion and neck supple.   Skin:     General: Skin is warm.   Neurological:      General: No focal deficit present.      Mental Status: She is alert.   Psychiatric:         Mood and Affect: Mood normal.         Behavior: Behavior normal.       Significant Labs: All pertinent labs within the past 24 hours have been reviewed.  Recent Lab Results         01/14/23  0405        Albumin/Globulin Ratio 0.9       Albumin 2.5       Alkaline Phosphatase 87       ALT 13       AST 42       Baso # 0.06       Basophil % 0.9       BILIRUBIN TOTAL 0.6       BUN 33.9       Calcium 9.1       Chloride 108       CO2 25       Creatinine 1.23       eGFR 42       Eos # 0.35       Eosinophil % 5.5       Globulin, Total 2.9       Glucose 97       Hematocrit 35.9       Hemoglobin 10.4       Immature Grans (Abs) 0.02       Immature Granulocytes 0.3       Lymph # 1.13       LYMPH % 17.7       MCH 23.6       MCHC 29.0       MCV 81.6       Mono # 0.43       Mono % 6.7       MPV --  Comment: N/a       Neut # 4.41       Neut % 68.9       nRBC 0.0       Platelets 263       Potassium 3.6       PROTEIN TOTAL 5.4       RBC 4.40       RDW  --  Comment: N/a       Sodium 141       WBC 6.4               Significant Imaging: I have reviewed all pertinent imaging results/findings within the past 24 hours.

## 2023-01-14 NOTE — PROGRESS NOTES
Cardiology Daily Progress Note    Patient Name: Dee Haji  Age: 88 y.o.  : 1934  MRN: [id number]  Admission Date: 1/10/2023  Today's Date: 2023     Subjective:  Patient awake alert resting comfortably in her bedside chair.  Patient's family is at bedside in bed plan of care and progress.  He adds that her appetite is gradually improving as well as her strength.  She is been ambulating in her room and with the help of the physical therapy team.  She denies chest discomfort or shortness of breath.      Scheduled Meds:   atorvastatin  40 mg Oral Daily    cefTRIAXone (ROCEPHIN) IVPB  1 g Intravenous Q24H    clopidogreL  75 mg Oral Daily    docusate sodium  100 mg Oral BID    enoxaparin  1 mg/kg Subcutaneous Q24H    EScitalopram oxalate  10 mg Oral Daily    levothyroxine  50 mcg Oral Before breakfast    pantoprazole  40 mg Oral BID    ranolazine  500 mg Oral BID       Continuous Infusions:    PRN Meds:.acetaminophen, melatonin, sodium chloride 0.9%      Objective:   Vitals:    23 2335 23 0346 23 0745 23 1132   BP: 124/84 121/64 125/72 (!) 110/56   Pulse: 80 77 88 94   Resp:   18 19   Temp: 97.9 °F (36.6 °C) 97.9 °F (36.6 °C) 97.8 °F (36.6 °C) 98 °F (36.7 °C)   TempSrc:   Oral Oral   SpO2: 96% 97% 96% 98%   Weight:       Height:           Intake/Output Summary (Last 24 hours) at 2023 1301  Last data filed at 2023 0620  Gross per 24 hour   Intake 2125 ml   Output 600 ml   Net 1525 ml     Wt Readings from Last 3 Encounters:   23 39.2 kg (86 lb 6.7 oz)   01/10/23 (P) 38.4 kg (84 lb 9.6 oz)   22 42.1 kg (92 lb 13 oz)          Physical Exam  Vitals reviewed.   Constitutional:       Appearance: Normal appearance.   Cardiovascular:      Rate and Rhythm: Normal rate and regular rhythm.      Pulses: Normal pulses.      Heart sounds: Normal heart sounds.   Pulmonary:      Effort: Pulmonary effort is normal.      Breath sounds: Normal breath sounds.   Abdominal:       General: Abdomen is flat. Bowel sounds are normal.      Palpations: Abdomen is soft.   Musculoskeletal:         General: Normal range of motion.      Cervical back: Normal range of motion and neck supple.   Skin:     General: Skin is warm.   Neurological:      General: No focal deficit present.      Mental Status: She is alert.   Psychiatric:         Mood and Affect: Mood normal.         Laboratory:   Lab Results   Component Value Date    [id number] Not Detected 12/20/2021       Recent Labs   Lab 01/10/23  1553 01/11/23 0413 01/14/23  0405   WBC 6.3 7.6 6.4   HGB 12.7 11.6* 10.4*   HCT 44.7 39.5 35.9*    253 263     Recent Labs   Lab 01/09/23  0900 01/10/23  1553 01/10/23  2239 01/11/23  0413 01/12/23  0327 01/14/23  0405    135*   < > 137 137 141   K 4.8 6.3*   < > 4.8 4.2 3.6   CO2 31 30   < > 28 28 25   BUN 80.9* 85.2*   < > 73.7* 60.3* 33.9*   CREATININE 2.97* 3.13*   < > 2.51* 2.13* 1.23*   CALCIUM 10.9* 11.0*   < > 10.6* 9.3 9.1   MG 2.30 2.50  --  2.00 1.80  --    PHOS 3.9  --   --  3.3  --   --     < > = values in this interval not displayed.     Recent Labs   Lab 01/10/23  1553 01/10/23  2239 01/11/23  0413 01/12/23  0327 01/14/23  0405   ALKPHOS 94   < > 85 79 87   ALT 13   < > 12 9 13   AST 50*   < > 48* 40* 42*   ALBUMIN 3.4   < > 3.0* 2.5* 2.5*   BILITOT 0.9   < > 0.9 0.8 0.6   INR 1.07  --   --   --   --     < > = values in this interval not displayed.      No results for input(s): DDIMER, FERRITIN, CRP, LDH, BNP, TROPONINI, CPK in the last 72 hours.    Invalid input(s): PROCALCITONIN    All labs within the last 24 hours were reviewed.     Microbiology:   Microbiology Results (last 7 days)       Procedure Component Value Units Date/Time    Urine culture [[id number]]  (Abnormal)  (Susceptibility) Collected: 01/11/23 0641    Order Status: Completed Specimen: Urine Updated: 01/14/23 0934     Urine Culture >/= 100,000 colonies/ml Escherichia coli      >/= 100,000 colonies/ml Proteus  mirabilis            Imaging:    ECG Results              EKG 12-lead (Final result)  Result time 01/10/23 17:31:42      Final result by Interface, Lab In [facility] (01/10/23 17:31:42)                   Narrative:    Test Reason : R53.1,    Vent. Rate : 086 BPM     Atrial Rate : 086 BPM     P-R Int : 292 ms          QRS Dur : 104 ms      QT Int : 372 ms       P-R-T Axes : 081 -12 009 degrees     QTc Int : 445 ms    Sinus rhythm with 1st degree A-V block  Otherwise normal ECG  When compared with ECG of 09-DEC-2022 21:07,  Sinus rhythm has replaced Atrial fibrillation  Confirmed by Emmanuel Lim MD ([id number]) on 1/10/2023 5:31:28 PM    Referred By:             Confirmed By:Emmanuel Sims                                    Results for orders placed during the hospital encounter of 12/06/22    Echo    Interpretation Summary  · The estimated ejection fraction is 40-45%.  · Eccentric hypertrophy and low normal systolic function.  · Indeterminate left ventricular diastolic function.  · Mild to moderate left atrial enlargement.  · Severe right ventricular enlargement.  · Likely mechanical prosthesis present.  · The mean diastolic gradient across the mitral valve is 10 mmHg at a heart rate of 75 bpm.  · Mild mitral regurgitation.  · There is a bioprosthetic aortic valve present. There is mild central aortic insufficiency present.  · The aortic valve mean gradient is 10 mmHg with a dimensionless index of 0.44.  · Moderate tricuspid regurgitation.  · Elevated central venous pressure (15 mmHg).  · The estimated PA systolic pressure is 60 mmHg.  · There is pulmonary hypertension.      X-Ray Chest AP Portable  Narrative: EXAMINATION:  XR CHEST AP PORTABLE    CLINICAL HISTORY:  weakness;    COMPARISON:  11 December 2022    FINDINGS:  Portable frontal view of the chest was obtained. Heart and mediastinum are unchanged.  Similar appearance of chronic interstitial changes of the lungs with no new focal consolidation.   No pneumothorax.  Impression: No acute findings.  Similar appearance of the chest.    Electronically signed by: David Parker  Date:    01/10/2023  Time:    20:10      Telemetry:  No telemetry at this time.        Assessment:     Active Hospital Problems    Diagnosis  POA    *Acute kidney injury superimposed on CKD [N17.9, N18.9]  Yes    Weakness [R53.1]  No    Acute on chronic combined systolic and diastolic heart failure [I50.43]  Yes     Chronic    S/P AVR (aortic valve replacement) [Z95.2]  Not Applicable    Stage 3a chronic kidney disease [N18.31]  Yes     Chronic      Resolved Hospital Problems   No resolved problems to display.          Plan:         Clinically, she continues to do well from a cardiac standpoint.  I would like place her on telemetry and continue to monitor her rhythm closely.  For renal function seems to almost back to normal and she continues to tolerate the gentle hydration. I have encouraged her to increase her p.o. intake as well.  We will continue to monitor her labs closely and we will obtain an EKG today.  Ectopy was noted on her evaluation today.  She is tolerating the antibiotics for the UTI.

## 2023-01-15 NOTE — ASSESSMENT & PLAN NOTE
Serum creatinine down to 1.5.  Essentially at baseline   Possible discharge to home in the morning.

## 2023-01-15 NOTE — PROGRESS NOTES
[facility] Inpatient Progress Note      HPI:     Patient Name: Dee Haji  Admission Date: 1/10/2023  Hospital Length of Stay: 5 days  Code Status: DNR   Attending Physician: Momo Rodriguez, *   Primary Care Physician: Tierra Johnson MD  Principal Problem:Acute kidney injury superimposed on CKD      Today patient seen and examined  Labs, recent events, imaging and medications reviewed for this hospital stay  Occ nausea  No increase in weakness  No  symptoms  No worsening dyspnea      Review of Systems:   No increase in dyspnea  Occ nausea  No dysuria  No increase edema  Good UO  Other wise no change        Medications:   Scheduled Meds:   atorvastatin  40 mg Oral Daily    cefTRIAXone (ROCEPHIN) IVPB  1 g Intravenous Q24H    clopidogreL  75 mg Oral Daily    docusate sodium  100 mg Oral BID    enoxaparin  1 mg/kg Subcutaneous Q24H    EScitalopram oxalate  10 mg Oral Daily    levothyroxine  50 mcg Oral Before breakfast    pantoprazole  40 mg Oral BID    ranolazine  500 mg Oral BID     Continuous Infusions:      Vitals:     Vitals:    01/14/23 1945 01/14/23 2330 01/15/23 0331 01/15/23 0750   BP: 107/71 102/61 119/74 122/78   Pulse: 96 96 86 83   Resp: 18 18 14 18   Temp: 97.8 °F (36.6 °C) 98 °F (36.7 °C) 97.6 °F (36.4 °C) 97.6 °F (36.4 °C)   TempSrc:  Oral Oral Oral   SpO2: 97% 98% 97% 97%   Weight:       Height:             I/O last 3 completed shifts:  In: 1910 [P.O.:1310; I.V.:600]  Out: 600 [Urine:600]    Intake/Output Summary (Last 24 hours) at 1/15/2023 1021  Last data filed at 1/14/2023 1507  Gross per 24 hour   Intake 800 ml   Output --   Net 800 ml       Physical Exam:   General: no acute distress, awake, alert  Eyes: PERRLA, EOMI, conjunctiva clear, eyelids without swelling  HENT: atraumatic, oropharynx and nasal mucosa patent  Neck: full ROM,++ JVD, no thyromegaly or lymphadenopathy  Respiratory: equal, unlabored, rales at bases  Cardiovascular: RRR, ++SEMG4/6 lSB BL radial and  pedal pulses felt  Edema: none  Gastrointestinal: soft, non-tender, non-distended; positive bowel sounds; no masses to palpation  Musculoskeletal:ROM without new limitation or discomfort  Integumentary: warm, dry; no rashes, wounds, or skin lesions  Neurological: oriented, appropriate, no acute deficits        Labs:     Chemistries:   Recent Labs   Lab 01/09/23  0900 01/10/23  1553 01/10/23  2239 01/11/23  0413 01/12/23  0327 01/14/23  0405    135*   < > 137 137 141   K 4.8 6.3*   < > 4.8 4.2 3.6   CO2 31 30   < > 28 28 25   BUN 80.9* 85.2*   < > 73.7* 60.3* 33.9*   CREATININE 2.97* 3.13*   < > 2.51* 2.13* 1.23*   CALCIUM 10.9* 11.0*   < > 10.6* 9.3 9.1   BILITOT 1.2 0.9   < > 0.9 0.8 0.6   ALKPHOS 109 94   < > 85 79 87   ALT 17 13   < > 12 9 13   AST 53* 50*   < > 48* 40* 42*   GLUCOSE 105 109   < > 82 95 97   MG 2.30 2.50  --  2.00 1.80  --    PHOS 3.9  --   --  3.3  --   --     < > = values in this interval not displayed.        CBC/Anemia Labs: Coags:    Recent Labs   Lab 01/10/23  1553 01/11/23  0413 01/14/23  0405   WBC 6.3 7.6 6.4   HGB 12.7 11.6* 10.4*   HCT 44.7 39.5 35.9*    253 263   MCV 80.5 79.6* 81.6    Recent Labs   Lab 01/10/23  1553   INR 1.07          Impression:     Patient Active Problem List   Diagnosis    Peripheral vascular disease, unspecified    Stage 3a chronic kidney disease    HTN (hypertension)    S/P AVR (aortic valve replacement)    Valvular heart disease    Acute kidney injury superimposed on CKD    Hypothyroidism, unspecified    Acute on chronic combined systolic and diastolic heart failure    Weakness     CKD3b  Cardiorenal disease ( severe)  SP RENÉ secondary to prerenal azotemia-->resolving  UTI    Plan:     Labs in am  Hopefully DC carlos Almeida

## 2023-01-15 NOTE — PROGRESS NOTES
[facility] - Oncology Acute Hospital Medicine  Progress Note    Patient Name: Dee Haji  MRN: [id number]  Patient Class: IP- Inpatient   Admission Date: 1/10/2023  Length of Stay: 5 days  Attending Physician: Momo Rodriguez, *  Primary Care Provider: Tierra Johnson MD        Subjective:     Principal Problem:Acute kidney injury superimposed on CKD        HPI:  No notes on file    Overview/Hospital Course:  No notes on file    Interval History:  P overall stable creatinine down 1.5.  Diet is good she is awake alert oriented x4 vital signs stable afebrile.  She is on Rocephin for UTI with E coli and Proteus both susceptible to Rocephin.  Review of Systems   Constitutional: Negative.  Negative for chills and fever.   HENT: Negative.     Eyes: Negative.    Respiratory: Negative.  Negative for cough and shortness of breath.    Cardiovascular: Negative.  Negative for chest pain, palpitations and leg swelling.   Gastrointestinal: Negative.  Negative for abdominal distention, abdominal pain, constipation, diarrhea, nausea and vomiting.   Endocrine: Negative.    Genitourinary: Negative.  Negative for dysuria and hematuria.   Musculoskeletal: Negative.  Negative for arthralgias, back pain and joint swelling.   Skin: Negative.  Negative for rash.   Allergic/Immunologic: Negative.    Neurological: Negative.  Negative for dizziness, seizures and headaches.   Hematological: Negative.    Psychiatric/Behavioral: Negative.     Objective:     Vital Signs (Most Recent):  Temp: 98 °F (36.7 °C) (01/15/23 1143)  Pulse: 84 (01/15/23 1143)  Resp: 20 (01/15/23 1143)  BP: 126/68 (01/15/23 1143)  SpO2: 98 % (01/15/23 1143) Vital Signs (24h Range):  Temp:  [97.6 °F (36.4 °C)-98 °F (36.7 °C)] 98 °F (36.7 °C)  Pulse:  [83-96] 84  Resp:  [14-20] 20  SpO2:  [97 %-98 %] 98 %  BP: (102-126)/(61-78) 126/68     Weight: 39.2 kg (86 lb 6.7 oz)  Body mass index is 16.88 kg/m².    Intake/Output Summary (Last 24 hours)  at 1/15/2023 1601  Last data filed at 1/15/2023 1452  Gross per 24 hour   Intake 440 ml   Output 750 ml   Net -310 ml        Physical Exam  Eyes:      Pupils: Pupils are equal, round, and reactive to light.   Cardiovascular:      Rate and Rhythm: Normal rate.      Pulses: Normal pulses.      Heart sounds: No murmur heard.  Pulmonary:      Effort: Pulmonary effort is normal.      Breath sounds: Normal breath sounds.   Abdominal:      General: Abdomen is flat. Bowel sounds are normal. There is no distension.      Palpations: Abdomen is soft.      Tenderness: There is no guarding.   Musculoskeletal:         General: Normal range of motion.      Cervical back: Normal range of motion and neck supple.   Skin:     General: Skin is warm.   Neurological:      General: No focal deficit present.      Mental Status: She is alert.   Psychiatric:         Mood and Affect: Mood normal.         Behavior: Behavior normal.       Significant Labs: All pertinent labs within the past 24 hours have been reviewed.  Recent Lab Results       None            Significant Imaging: I have reviewed all pertinent imaging results/findings within the past 24 hours.      Assessment/Plan:      * Acute kidney injury superimposed on CKD  Serum creatinine down to 1.5.  Essentially at baseline   Possible discharge to home in the morning.      Weakness  Much improved    Acute on chronic combined systolic and diastolic heart failure  Currently compensated continue current therapy      S/P AVR (aortic valve replacement)  Stable no shortness breath cardiology is on board      Stage 3a chronic kidney disease  Nephrology is on board        VTE Risk Mitigation (From admission, onward)         Ordered     enoxaparin injection 40 mg  Every 24 hours (non-standard times)         01/11/23 0855     IP VTE HIGH RISK PATIENT  Once         01/10/23 1902     Place sequential compression device  Until discontinued         01/10/23 1902                Discharge Planning    JOO:      Code Status: DNR   Is the patient medically ready for discharge?:     Reason for patient still in hospital (select all that apply): Laboratory test and Treatment  Discharge Plan A: Home with family                  CHRISTA DE LA VEGA MD  Department of [facility] Medicine   [facility]

## 2023-01-15 NOTE — PLAN OF CARE
Problem: Adult Inpatient Plan of Care  Goal: Plan of Care Review  Outcome: Ongoing, Progressing  Flowsheets (Taken 1/14/2023 2335)  Plan of Care Reviewed With: patient  Intervention: Monitor Pain and Promote Comfort  Flowsheets (Taken 1/14/2023 2335)  Pain Management Interventions:   care clustered   relaxation techniques promoted   quiet environment facilitated  Intervention: Provide Person-Centered Care  Flowsheets (Taken 1/14/2023 2335)  Trust Relationship/Rapport:   care explained   choices provided   emotional support provided   empathic listening provided   questions answered   questions encouraged   reassurance provided   thoughts/feelings acknowledged  Goal: Readiness for Transition of Care  Outcome: Ongoing, Progressing       Intervention: Promote and Optimize Nutrition  Flowsheets (Taken 1/14/2023 2335)  Oral Nutrition Promotion: rest periods promoted     Problem: Renal Function Impairment (Acute Kidney Injury/Impairment)  Goal: Effective Renal Function  Outcome: Ongoing, Progressing     Problem: Impaired Wound Healing  Goal: Optimal Wound Healing  Outcome: Ongoing, Progressing  Intervention: Promote Wound Healing  Flowsheets (Taken 1/14/2023 2335)  Oral Nutrition Promotion: rest periods promoted  Sleep/Rest Enhancement:   awakenings minimized   room darkened   relaxation techniques promoted   regular sleep/rest pattern promoted   noise level reduced  Pain Management Interventions:   care clustered   relaxation techniques promoted   quiet environment facilitated     Problem: Skin Injury Risk Increased  Goal: Skin Health and Integrity  Outcome: Ongoing, Progressing

## 2023-01-15 NOTE — SUBJECTIVE & OBJECTIVE
Interval History:  P overall stable creatinine down 1.5.  Diet is good she is awake alert oriented x4 vital signs stable afebrile.  She is on Rocephin for UTI with E coli and Proteus both susceptible to Rocephin.  Review of Systems   Constitutional: Negative.  Negative for chills and fever.   HENT: Negative.     Eyes: Negative.    Respiratory: Negative.  Negative for cough and shortness of breath.    Cardiovascular: Negative.  Negative for chest pain, palpitations and leg swelling.   Gastrointestinal: Negative.  Negative for abdominal distention, abdominal pain, constipation, diarrhea, nausea and vomiting.   Endocrine: Negative.    Genitourinary: Negative.  Negative for dysuria and hematuria.   Musculoskeletal: Negative.  Negative for arthralgias, back pain and joint swelling.   Skin: Negative.  Negative for rash.   Allergic/Immunologic: Negative.    Neurological: Negative.  Negative for dizziness, seizures and headaches.   Hematological: Negative.    Psychiatric/Behavioral: Negative.     Objective:     Vital Signs (Most Recent):  Temp: 98 °F (36.7 °C) (01/15/23 1143)  Pulse: 84 (01/15/23 1143)  Resp: 20 (01/15/23 1143)  BP: 126/68 (01/15/23 1143)  SpO2: 98 % (01/15/23 1143) Vital Signs (24h Range):  Temp:  [97.6 °F (36.4 °C)-98 °F (36.7 °C)] 98 °F (36.7 °C)  Pulse:  [83-96] 84  Resp:  [14-20] 20  SpO2:  [97 %-98 %] 98 %  BP: (102-126)/(61-78) 126/68     Weight: 39.2 kg (86 lb 6.7 oz)  Body mass index is 16.88 kg/m².    Intake/Output Summary (Last 24 hours) at 1/15/2023 1601  Last data filed at 1/15/2023 1452  Gross per 24 hour   Intake 440 ml   Output 750 ml   Net -310 ml        Physical Exam  Eyes:      Pupils: Pupils are equal, round, and reactive to light.   Cardiovascular:      Rate and Rhythm: Normal rate.      Pulses: Normal pulses.      Heart sounds: No murmur heard.  Pulmonary:      Effort: Pulmonary effort is normal.      Breath sounds: Normal breath sounds.   Abdominal:      General: Abdomen is flat.  Bowel sounds are normal. There is no distension.      Palpations: Abdomen is soft.      Tenderness: There is no guarding.   Musculoskeletal:         General: Normal range of motion.      Cervical back: Normal range of motion and neck supple.   Skin:     General: Skin is warm.   Neurological:      General: No focal deficit present.      Mental Status: She is alert.   Psychiatric:         Mood and Affect: Mood normal.         Behavior: Behavior normal.       Significant Labs: All pertinent labs within the past 24 hours have been reviewed.  Recent Lab Results       None            Significant Imaging: I have reviewed all pertinent imaging results/findings within the past 24 hours.

## 2023-01-16 PROBLEM — Z98.61 HISTORY OF PTCA: Chronic | Status: ACTIVE | Noted: 2023-01-01

## 2023-01-16 NOTE — PLAN OF CARE
Problem: Adult Inpatient Plan of Care  Goal: Plan of Care Review  Outcome: Ongoing, Progressing  Flowsheets (Taken 1/15/2023 2003)  Plan of Care Reviewed With: patient  Goal: Patient-Specific Goal (Individualized)  Outcome: Ongoing, Progressing  Goal: Absence of Hospital-Acquired Illness or Injury  Outcome: Ongoing, Progressing  Intervention: Identify and Manage Fall Risk  Flowsheets (Taken 1/15/2023 2003)  Safety Promotion/Fall Prevention:   assistive device/personal item within reach   bedside commode chair   commode/urinal/bedpan at bedside   Fall Risk reviewed with patient/family   Fall Risk signage in place   lighting adjusted   medications reviewed   muscle strengthening facilitated   nonskid shoes/socks when out of bed   side rails raised x 2   instructed to call staff for mobility  Intervention: Prevent Skin Injury  Flowsheets (Taken 1/15/2023 2003)  Skin Protection: protective footwear used  Intervention: Prevent and Manage VTE (Venous Thromboembolism) Risk  Flowsheets (Taken 1/15/2023 2003)  Activity Management: Ambulated to bathroom - L4  VTE Prevention/Management:   ambulation promoted   bleeding precations maintained   bleeding risk assessed  Range of Motion: ROM (range of motion) performed  Intervention: Prevent Infection  Flowsheets (Taken 1/15/2023 2003)  Infection Prevention:   environmental surveillance performed   equipment surfaces disinfected   hand hygiene promoted   personal protective equipment utilized   rest/sleep promoted   single patient room provided  Goal: Optimal Comfort and Wellbeing  Outcome: Ongoing, Progressing  Intervention: Monitor Pain and Promote Comfort  Flowsheets (Taken 1/15/2023 2003)  Pain Management Interventions:   care clustered   position adjusted   quiet environment facilitated  Intervention: Provide Person-Centered Care  Flowsheets (Taken 1/15/2023 2003)  Trust Relationship/Rapport:   care explained   choices provided   emotional support provided   empathic  listening provided   questions answered   thoughts/feelings acknowledged  Goal: Readiness for Transition of Care  Outcome: Ongoing, Progressing     Problem: Fluid and Electrolyte Imbalance (Acute Kidney Injury/Impairment)  Goal: Fluid and Electrolyte Balance  Outcome: Ongoing, Progressing     Problem: Oral Intake Inadequate (Acute Kidney Injury/Impairment)  Goal: Optimal Nutrition Intake  Outcome: Ongoing, Progressing     Problem: Renal Function Impairment (Acute Kidney Injury/Impairment)  Goal: Effective Renal Function  Outcome: Ongoing, Progressing     Problem: Impaired Wound Healing  Goal: Optimal Wound Healing  Outcome: Ongoing, Progressing     Problem: Skin Injury Risk Increased  Goal: Skin Health and Integrity  Outcome: Ongoing, Progressing  Intervention: Optimize Skin Protection  Flowsheets (Taken 1/15/2023 2003)  Pressure Reduction Techniques:   positioned off wounds   weight shift assistance provided  Pressure Reduction Devices: positioning supports utilized  Skin Protection: protective footwear used     Problem: Fall Injury Risk  Goal: Absence of Fall and Fall-Related Injury  Outcome: Ongoing, Progressing  Intervention: Identify and Manage Contributors  Flowsheets (Taken 1/15/2023 2003)  Self-Care Promotion:   independence encouraged   BADL personal objects within reach  Medication Review/Management: medications reviewed  Intervention: Promote Injury-Free Environment  Flowsheets (Taken 1/15/2023 2003)  Safety Promotion/Fall Prevention:   assistive device/personal item within reach   bedside commode chair   commode/urinal/bedpan at bedside   Fall Risk reviewed with patient/family   Fall Risk signage in place   lighting adjusted   medications reviewed   muscle strengthening facilitated   nonskid shoes/socks when out of bed   side rails raised x 2   instructed to call staff for mobility

## 2023-01-16 NOTE — DISCHARGE SUMMARY
"[facility]  Oncology Acute  [facility] Medicine  Discharge Summary      Patient Name: Dee Haji  MRN: [id number]  Admission Date: 1/10/2023  Hospital Length of Stay: 6 days  Discharge Date and Time:  01/16/2023 12:00 PM  Attending Physician: Momo Rodriguez, *   Discharging Provider: CHRISTA DE LA VEGA MD  Primary Care Provider: Tierra Johnson MD        HPI: 88 y.o. female patient of Dr Tate; she has a past medical history of Combined systolic and diastolic heart failure, A-fib, CAD (coronary artery disease), CVA (cerebrovascular accident), Dyspnea on exertion, GERD (gastroesophageal reflux disease), HTN (hypertension), Mixed hyperlipidemia, Renal disorder, S/P AVR (aortic valve replacement), Synovial cyst of knee, TIA (transient ischemic attack), Unspecified hemorrhoids, and Unspecified osteoarthritis, unspecified site.  Patient previously seen 12/19/22 for hospital discharge for fluid overload.  At the time, noted to have been discharged on torsemide and had her Diamox discontinued.     She presented to the office on 1-10-22 sitting in wheelchair accompanied by her  with complaints worsening fatigue/ weakness.  Onset within the last 7 days.  Has been suffering from some bouts of nausea with inability to drink much fluids.  However, now feels "too weak to raise glass of water to drink".  Blood pressure in office 88/60 with repeat manual 83/61.  Recently completed blood work with noted BUN 80.9 (01/09/23) worsened from 57 (12/16/22),  creatinine 2.97 worsened from 1.67.       Cardiologist: Dr. Payton   GI: Dr. Nunez  Renal: Dr. Almeida     She is seen on the morning following admission sitting up in bed eating some breakfast this morning he drank her juice and drink her coffee in his eating some grits.  She states she does not want to look at eggs because they eat them at home every morning.  BUN this morning of 73 and creatinine of 2.51 calcium of 10.6.  Nephrology " service is following who have started gentle IV fluid resuscitation.  Patient reports just feeling generally weak.     Initial troponin elevated at 0.072 with repeat of 0.054.  Patient denies any chest pain or shortness of breath.  EKG reviewed with ventricular rate of 86 beats per minute, first-degree AV block, regular rhythm, normal MD intervals and normal ST segment.  Echocardiogram from December 2022 with EF of 40-45% with severe right ventricular enlargement, bioprosthetic aortic valve present with mild central aortic insufficiency.  Gradient of 10 mmHg, moderate TR.  PAP pressure of 60.       * No surgery found *      Hospital Course:  Hospital course showed signs of slow improvement.  She was placed on diuretics and fluids kidney function came back to normal.  Urinalysis revealed UTI with Proteus and E coli both susceptible Rocephin.  She tolerated this completed 4 days of IV antibiotics.  She is completely asymptomatic.  She is back to baseline tolerating p.o. diet she is ambulating and working with physical therapy.  Vital signs stable afebrile.  Cardiac troponins were initially elevated possibly related to her acute kidney injury and if normalized..  Otherwise back to her baseline she is okay for discharge she is to resume her torsemide 20 mg a day and also her Xarelto given her history of bioprosthetic aortic valve.  Otherwise stable and okay to go home.  Please see medicine reconciliation for any further medication changes.    Consults:   Consults (From admission, onward)          Status Ordering Provider     Inpatient consult to Registered Dietitian/Nutritionist  Once        Provider:  (Not yet assigned)    MELBA Camarena     Inpatient consult to Nephrology  Once        Provider:  Antonio Almeida MD    Acknowledged VERONIKA STANFORD     Inpatient consult to Cardiology  Once        Provider:  Antony Payton DO    Acknowledged VERONIKA STANFORD            Final Active Diagnoses:     Diagnosis Date Noted POA    PRINCIPAL PROBLEM:  Acute kidney injury superimposed on CKD [N17.9, N18.9] 06/19/2022 Yes    Weakness [R53.1] 01/11/2023 No    Acute on chronic combined systolic and diastolic heart failure [I50.43] 12/07/2022 Yes     Chronic    S/P AVR (aortic valve replacement) [Z95.2] 05/04/2022 Not Applicable    Stage 3a chronic kidney disease [N18.31] 05/03/2022 Yes     Chronic      Problems Resolved During this Admission:      Discharged Condition: good    Disposition: Home or Self Care    Follow Up:   Follow-up Information       Tierra Johnson MD Follow up.    Specialty: Internal Medicine  Contact information:  [address] [place] [id number] [phone]                           Patient Instructions:   No discharge procedures on file.  Medications:  Reconciled Home Medications:      Medication List        CONTINUE taking these medications      CENTRUM SILVER ORAL  Take 1 tablet by mouth once daily.     cholecalciferol (vitamin D3) 125 mcg (5,000 unit) capsule  Take 5,000 Int'l Units by mouth once daily.     clopidogreL 75 mg tablet  Commonly known as: PLAVIX  Take 75 mg by mouth once daily.     docusate sodium 100 MG capsule  Commonly known as: COLACE  Take 100 mg by mouth 2 (two) times daily.     EScitalopram oxalate 10 MG tablet  Commonly known as: LEXAPRO  Take 1 tablet (10 mg total) by mouth once daily.     fenofibrate micronized 134 MG Cap  Commonly known as: LOFIBRA  Take 134 mg by mouth once daily at 6am.     hydrOXYzine HCL 25 MG tablet  Commonly known as: ATARAX  Take 1 tablet (25 mg total) by mouth every evening.     levothyroxine 50 MCG tablet  Commonly known as: SYNTHROID  Take 1 tablet (50 mcg total) by mouth before breakfast.     nitroGLYCERIN 0.4 MG SL tablet  Commonly known as: NITROSTAT  Place 0.4 mg under the tongue every 5 (five) minutes as needed. X 3 doses     ondansetron 4 MG tablet  Commonly known as: ZOFRAN  Take 1 tablet (4 mg total) by mouth every 6 (six)  hours as needed for Nausea.     pantoprazole 40 MG tablet  Commonly known as: PROTONIX  Take 40 mg by mouth 2 (two) times daily.     potassium chloride SA 20 MEQ tablet  Commonly known as: K-DUR,KLOR-CON  TAKE 1 TABLET BY MOUTH EVERY DAY     PRESERVISION AREDS ORAL  Take 1 capsule by mouth once daily.     ranolazine 500 MG Tb12  Commonly known as: RANEXA  Take 500 mg by mouth 2 (two) times daily.     rosuvastatin 40 MG Tab  Commonly known as: CRESTOR  Take 40 mg by mouth once daily.     spironolactone 25 MG tablet  Commonly known as: ALDACTONE  Take 1 tablet (25 mg total) by mouth once daily.     sucralfate 1 gram tablet  Commonly known as: CARAFATE  Take 1 g by mouth 3 (three) times daily.     torsemide 20 MG Tab  Commonly known as: DEMADEX  Take 1 tablet (20 mg total) by mouth once daily.     XARELTO 2.5 mg Tab  Generic drug: rivaroxaban  Take 2.5 mg by mouth 2 (two) times daily.              Significant Diagnostic Studies: Labs: All labs within the past 24 hours have been reviewed    Pending Diagnostic Studies:       None          Indwelling Lines/Drains at time of discharge:   Lines/Drains/Airways       None                   Time spent on the discharge of patient: 22 minutes         CHRISTA DE LA VEGA MD  Department of Hospital Medicine  [facility] - Oncology [facility]

## 2023-01-16 NOTE — PLAN OF CARE
01/16/23 1316   Final Note   Assessment Type Final Discharge Note   Anticipated Discharge Disposition Int Care Fac  (On license of [facility])   Post-Acute Status   Post-Acute Authorization Other   Other Status See Comments  (Return to assisted living)     Notified [facility] of return today. Discharge information faxed. Spouse will transport.

## 2023-01-16 NOTE — PROGRESS NOTES
Cardiology Daily Progress Note    Patient Name: Dee Haji  Age: 88 y.o.  : 1934  MRN: [id number]  Admission Date: 1/10/2023  Today's Date: 2023     Subjective:  Awake alert resting comfortably in her bedside chair the moment.  She did have some nausea after working with physical therapy team today and I have asked the nurse to administer some IV Zofran.  She denies chest discomfort.  She has that her strength and appetite continues to improve.  It appears that she may be expecting discharge home today.      Scheduled Meds:   atorvastatin  40 mg Oral Daily    cefTRIAXone (ROCEPHIN) IVPB  1 g Intravenous Q24H    clopidogreL  75 mg Oral Daily    docusate sodium  100 mg Oral BID    enoxaparin  1 mg/kg Subcutaneous Q24H    EScitalopram oxalate  10 mg Oral Daily    levothyroxine  50 mcg Oral Before breakfast    pantoprazole  40 mg Oral BID    ranolazine  500 mg Oral BID    torsemide  20 mg Oral Daily       Continuous Infusions:    PRN Meds:.acetaminophen, melatonin, ondansetron, sodium chloride 0.9%      Objective:   Vitals:    23 0300 23 0742 23 0948 23 1127   BP: 118/76 130/79 130/79 99/65   Pulse: 102 84  99   Resp:  16  18   Temp: 97.6 °F (36.4 °C) 98 °F (36.7 °C)  97.9 °F (36.6 °C)   TempSrc: Oral Oral  Oral   SpO2: (!) 94% (!) 94%  (!) 94%   Weight:       Height:           Intake/Output Summary (Last 24 hours) at 2023 1405  Last data filed at 2023 0600  Gross per 24 hour   Intake 720 ml   Output 1200 ml   Net -480 ml     Wt Readings from Last 3 Encounters:   23 39.2 kg (86 lb 6.7 oz)   01/10/23 (P) 38.4 kg (84 lb 9.6 oz)   22 42.1 kg (92 lb 13 oz)          Physical Exam  Vitals reviewed.   Constitutional:       Appearance: Normal appearance.   Cardiovascular:      Rate and Rhythm: Normal rate and regular rhythm.      Pulses: Normal pulses.      Heart sounds: Normal heart sounds.   Pulmonary:      Effort: Pulmonary effort is normal.      Breath sounds:  Normal breath sounds.   Abdominal:      General: Abdomen is flat. Bowel sounds are normal.      Palpations: Abdomen is soft.   Musculoskeletal:         General: Normal range of motion.      Cervical back: Normal range of motion and neck supple.   Skin:     General: Skin is warm and dry.   Neurological:      General: No focal deficit present.      Mental Status: She is alert.   Psychiatric:         Mood and Affect: Mood normal.         Laboratory:   Lab Results   Component Value Date    [id number] Not Detected 12/20/2021       Recent Labs   Lab 01/11/23  0413 01/14/23 0405 01/16/23 0444   WBC 7.6 6.4 7.0   HGB 11.6* 10.4* 10.2*   HCT 39.5 35.9* 34.2*    263 242     Recent Labs   Lab 01/11/23  0413 01/12/23 0327 01/14/23 0405 01/16/23 0444    137 141 139   K 4.8 4.2 3.6 3.6   CO2 28 28 25 25   BUN 73.7* 60.3* 33.9* 22.6*   CREATININE 2.51* 2.13* 1.23* 0.96   CALCIUM 10.6* 9.3 9.1 9.1   MG 2.00 1.80  --  1.60   PHOS 3.3  --   --  2.0*     Recent Labs   Lab 01/10/23  1553 01/10/23  2239 01/12/23 0327 01/14/23 0405 01/16/23  0444   ALKPHOS 94   < > 79 87 107   ALT 13   < > 9 13 22   AST 50*   < > 40* 42* 56*   ALBUMIN 3.4   < > 2.5* 2.5* 2.5*   BILITOT 0.9   < > 0.8 0.6 0.7   INR 1.07  --   --   --   --     < > = values in this interval not displayed.      No results for input(s): DDIMER, FERRITIN, CRP, LDH, BNP, TROPONINI, CPK in the last 72 hours.    Invalid input(s): PROCALCITONIN    All labs within the last 24 hours were reviewed.     Microbiology:   Microbiology Results (last 7 days)       Procedure Component Value Units Date/Time    Urine culture [[id number]]  (Abnormal)  (Susceptibility) Collected: 01/11/23 0641    Order Status: Completed Specimen: Urine Updated: 01/14/23 0934     Urine Culture >/= 100,000 colonies/ml Escherichia coli      >/= 100,000 colonies/ml Proteus mirabilis            Imaging:    ECG Results              EKG 12-lead (Final result)  Result time 01/10/23 17:31:42       Final result by Interface, Lab In [facility] (01/10/23 17:31:42)                   Narrative:    Test Reason : R53.1,    Vent. Rate : 086 BPM     Atrial Rate : 086 BPM     P-R Int : 292 ms          QRS Dur : 104 ms      QT Int : 372 ms       P-R-T Axes : 081 -12 009 degrees     QTc Int : 445 ms    Sinus rhythm with 1st degree A-V block  Otherwise normal ECG  When compared with ECG of 09-DEC-2022 21:07,  Sinus rhythm has replaced Atrial fibrillation  Confirmed by Emmanuel Lim MD ([id number]) on 1/10/2023 5:31:28 PM    Referred By:             Confirmed By:Emmanuel Sims                                    Results for orders placed during the hospital encounter of 12/06/22    Echo    Interpretation Summary  · The estimated ejection fraction is 40-45%.  · Eccentric hypertrophy and low normal systolic function.  · Indeterminate left ventricular diastolic function.  · Mild to moderate left atrial enlargement.  · Severe right ventricular enlargement.  · Likely mechanical prosthesis present.  · The mean diastolic gradient across the mitral valve is 10 mmHg at a heart rate of 75 bpm.  · Mild mitral regurgitation.  · There is a bioprosthetic aortic valve present. There is mild central aortic insufficiency present.  · The aortic valve mean gradient is 10 mmHg with a dimensionless index of 0.44.  · Moderate tricuspid regurgitation.  · Elevated central venous pressure (15 mmHg).  · The estimated PA systolic pressure is 60 mmHg.  · There is pulmonary hypertension.      X-Ray Chest AP Portable  Narrative: EXAMINATION:  XR CHEST AP PORTABLE    CLINICAL HISTORY:  weakness;    COMPARISON:  11 December 2022    FINDINGS:  Portable frontal view of the chest was obtained. Heart and mediastinum are unchanged.  Similar appearance of chronic interstitial changes of the lungs with no new focal consolidation.  No pneumothorax.  Impression: No acute findings.  Similar appearance of the chest.    Electronically signed by: David  Keith  Date:    01/10/2023  Time:    20:10      Telemetry:  Sinus rhythm with a heart rate currently 93 beats per minute after working with physical therapy.        Assessment:     Active Hospital Problems    Diagnosis  POA    *Acute kidney injury superimposed on CKD [N17.9, N18.9]  Yes    History of PTCA [Z98.61]  Not Applicable     Chronic    Weakness [R53.1]  No    Acute on chronic combined systolic and diastolic heart failure [I50.43]  Yes     Chronic    S/P AVR (aortic valve replacement) [Z95.2]  Not Applicable    Stage 3a chronic kidney disease [N18.31]  Yes     Chronic    CAD in native artery [I25.10]  Unknown      Resolved Hospital Problems   No resolved problems to display.          Plan:         The patient appears clinically stable from a cardiac standpoint.  She continues to do well after hydration over the weekend.  Her appetite and strength continues to improve.  Her rhythm has remained stable.  She is remained stable from cardiac standpoint especially in light of her history of CAD/PTCA and history of valvular heart disease bioprosthetic aortic valve replacement.    I agree with the current plan of care.    I will arrange for outpatient follow-up evaluation in my office in the near future.  Zofran for occasional nausea p.r.n.

## 2023-01-16 NOTE — PROGRESS NOTES
Cardiology Daily Progress Note    Patient Name: Dee Haji  Age: 88 y.o.  : 1934  MRN: [id number]  Admission Date: 1/10/2023  Today's Date: 1/15/2023     Subjective:  No new cardiovascular issues or complaints.      Scheduled Meds:   atorvastatin  40 mg Oral Daily    cefTRIAXone (ROCEPHIN) IVPB  1 g Intravenous Q24H    clopidogreL  75 mg Oral Daily    docusate sodium  100 mg Oral BID    enoxaparin  1 mg/kg Subcutaneous Q24H    EScitalopram oxalate  10 mg Oral Daily    levothyroxine  50 mcg Oral Before breakfast    pantoprazole  40 mg Oral BID    ranolazine  500 mg Oral BID       Continuous Infusions:    PRN Meds:.acetaminophen, melatonin, sodium chloride 0.9%      Objective:   Vitals:    01/15/23 0750 01/15/23 1143 01/15/23 1822 01/15/23 1906   BP: 122/78 126/68 108/67 116/71   Pulse: 83 84 87 86   Resp:    Temp: 97.6 °F (36.4 °C) 98 °F (36.7 °C) 98.3 °F (36.8 °C) 97.4 °F (36.3 °C)   TempSrc: Oral Oral Oral    SpO2: 97% 98% 98% 96%   Weight:       Height:           Intake/Output Summary (Last 24 hours) at 1/15/2023 2031  Last data filed at 1/15/2023 1452  Gross per 24 hour   Intake 440 ml   Output 750 ml   Net -310 ml     Wt Readings from Last 3 Encounters:   23 39.2 kg (86 lb 6.7 oz)   01/10/23 (P) 38.4 kg (84 lb 9.6 oz)   22 42.1 kg (92 lb 13 oz)          Physical Exam  Vitals reviewed.   Constitutional:       Appearance: Normal appearance.   Cardiovascular:      Rate and Rhythm: Regular rhythm.      Heart sounds: Normal heart sounds.   Pulmonary:      Effort: Pulmonary effort is normal.      Breath sounds: Normal breath sounds.   Musculoskeletal:         General: Normal range of motion.      Cervical back: Normal range of motion and neck supple.      Right lower leg: Edema present.      Left lower leg: No edema.         Laboratory:   Lab Results   Component Value Date    [id number] Not Detected 2021       Recent Labs   Lab 01/10/23  1553 23  0413 23  0405    WBC 6.3 7.6 6.4   HGB 12.7 11.6* 10.4*   HCT 44.7 39.5 35.9*    253 263     Recent Labs   Lab 01/09/23  0900 01/10/23  1553 01/10/23  2239 01/11/23  0413 01/12/23  0327 01/14/23  0405    135*   < > 137 137 141   K 4.8 6.3*   < > 4.8 4.2 3.6   CO2 31 30   < > 28 28 25   BUN 80.9* 85.2*   < > 73.7* 60.3* 33.9*   CREATININE 2.97* 3.13*   < > 2.51* 2.13* 1.23*   CALCIUM 10.9* 11.0*   < > 10.6* 9.3 9.1   MG 2.30 2.50  --  2.00 1.80  --    PHOS 3.9  --   --  3.3  --   --     < > = values in this interval not displayed.     Recent Labs   Lab 01/10/23  1553 01/10/23  2239 01/11/23  0413 01/12/23  0327 01/14/23  0405   ALKPHOS 94   < > 85 79 87   ALT 13   < > 12 9 13   AST 50*   < > 48* 40* 42*   ALBUMIN 3.4   < > 3.0* 2.5* 2.5*   BILITOT 0.9   < > 0.9 0.8 0.6   INR 1.07  --   --   --   --     < > = values in this interval not displayed.      No results for input(s): DDIMER, FERRITIN, CRP, LDH, BNP, TROPONINI, CPK in the last 72 hours.    Invalid input(s): PROCALCITONIN    All labs within the last 24 hours were reviewed.     Microbiology:   Microbiology Results (last 7 days)       Procedure Component Value Units Date/Time    Urine culture [[id number]]  (Abnormal)  (Susceptibility) Collected: 01/11/23 0641    Order Status: Completed Specimen: Urine Updated: 01/14/23 0934     Urine Culture >/= 100,000 colonies/ml Escherichia coli      >/= 100,000 colonies/ml Proteus mirabilis            Imaging:    ECG Results              EKG 12-lead (Final result)  Result time 01/10/23 17:31:42      Final result by Interface, [facility] (01/10/23 17:31:42)                   Narrative:    Test Reason : R53.1,    Vent. Rate : 086 BPM     Atrial Rate : 086 BPM     P-R Int : 292 ms          QRS Dur : 104 ms      QT Int : 372 ms       P-R-T Axes : 081 -12 009 degrees     QTc Int : 445 ms    Sinus rhythm with 1st degree A-V block  Otherwise normal ECG  When compared with ECG of 09-DEC-2022 21:07,  Sinus rhythm has replaced Atrial  fibrillation  Confirmed by Emmnauel Lim MD ([id number]) on 1/10/2023 5:31:28 PM    Referred By:             Confirmed By:Emmanuel Sims                                    Results for orders placed during the hospital encounter of 12/06/22    Echo    Interpretation Summary  · The estimated ejection fraction is 40-45%.  · Eccentric hypertrophy and low normal systolic function.  · Indeterminate left ventricular diastolic function.  · Mild to moderate left atrial enlargement.  · Severe right ventricular enlargement.  · Likely mechanical prosthesis present.  · The mean diastolic gradient across the mitral valve is 10 mmHg at a heart rate of 75 bpm.  · Mild mitral regurgitation.  · There is a bioprosthetic aortic valve present. There is mild central aortic insufficiency present.  · The aortic valve mean gradient is 10 mmHg with a dimensionless index of 0.44.  · Moderate tricuspid regurgitation.  · Elevated central venous pressure (15 mmHg).  · The estimated PA systolic pressure is 60 mmHg.  · There is pulmonary hypertension.      X-Ray Chest AP Portable  Narrative: EXAMINATION:  XR CHEST AP PORTABLE    CLINICAL HISTORY:  weakness;    COMPARISON:  11 December 2022    FINDINGS:  Portable frontal view of the chest was obtained. Heart and mediastinum are unchanged.  Similar appearance of chronic interstitial changes of the lungs with no new focal consolidation.  No pneumothorax.  Impression: No acute findings.  Similar appearance of the chest.    Electronically signed by: David Parker  Date:    01/10/2023  Time:    20:10      Telemetry:  Sinus rhythm with a first-degree AV block and heart rate in the 70s.  Occasional PVCs noted.        Assessment:     Active Hospital Problems    Diagnosis  POA    *Acute kidney injury superimposed on CKD [N17.9, N18.9]  Yes    Weakness [R53.1]  No    Acute on chronic combined systolic and diastolic heart failure [I50.43]  Yes     Chronic    S/P AVR (aortic valve replacement)  [Z95.2]  Not Applicable    Stage 3a chronic kidney disease [N18.31]  Yes     Chronic      Resolved Hospital Problems   No resolved problems to display.          Plan:         The patient remains clinically stable from a cardiac standpoint.  Laboratory data continues to improve.  Signs of dehydration continues to improve.  Strength continues to improve.  Further recommendations forthcoming.

## 2023-01-16 NOTE — PT/OT/SLP PROGRESS
Physical Therapy Treatment    Patient Name:  Dee Haji   MRN:  [id number]    Recommendations:     Discharge Recommendations:  home with home health   Discharge Equipment Recommendations: none     Subjective     Patient awake, alert, and cooperative.     Objective:     General Precautions: Standard, fall   Orthopedic Precautions:N/A   Braces:    Respiratory Status: Room air     Communicated with nurse prior to treatment.     Functional Mobility:    Rolling:Activity did not occur  Supine to sit:Activity did not occur  Sit to stand transfer: Stand-by Assistance  Bed to chair transfer:Stand-by Assistance  175 ft with RW SBA. Pt c/o nausea toward the end of ambulation and finished treatment with LE PRE's in which nausea started to subside. Cont POC.       AM-PAC 6 CLICK MOBILITY        Patient left up in chair with call button in reach.    GOALS:   Multidisciplinary Problems       Physical Therapy Goals          Problem: Physical Therapy    Goal Priority Disciplines Outcome Goal Variances Interventions   Physical Therapy Goal     PT, PT/OT Ongoing, Progressing     Description: Goals to be met by: 23     Patient will increase functional independence with mobility by performin. Supine to sit with Modified [place]  2. Sit to supine with Modified [place]  3. Sit to stand transfer with Modified [place]  4. Bed to chair transfer with Modified [place] using Rolling Walker  5. Gait  x 200 feet with Stand-by Assistance using Rolling Walker.                          Assessment:     Dee Haji is a 88 y.o. female admitted with a medical diagnosis of Acute kidney injury superimposed on CKD.     Patient Active Problem List   Diagnosis    Peripheral vascular disease, unspecified    Stage 3a chronic kidney disease    HTN (hypertension)    S/P AVR (aortic valve replacement)    Valvular heart disease    Acute kidney injury superimposed on CKD    Hypothyroidism, unspecified    Acute on chronic  combined systolic and diastolic heart failure    Weakness        Rehab Prognosis: Good; patient would benefit from acute skilled PT services to address these deficits and reach maximum level of function.    Recent Surgery: * No surgery found *      Plan:     During this hospitalization, patient to be seen 5 x/week to address the identified rehab impairments via gait training, therapeutic exercises, therapeutic activities and progress toward the following goals:    Plan of Care Expires:  02/11/23    Billable Minutes     Billable Minutes: Gait Training 12 and Therapeutic Exercise 12    Treatment Type: Treatment  PT/PTA: PTA     PTA Visit Number: 3     01/16/2023

## 2023-01-16 NOTE — PROGRESS NOTES
[facility] Inpatient Progress Note      HPI:     Patient Name: Dee Haji  Admission Date: 1/10/2023  Hospital Length of Stay: 6 days  Code Status: DNR   Attending Physician: Momo Rodriguez, *   Primary Care Physician: Tierra Johnson MD  Principal Problem:Acute kidney injury superimposed on CKD      Today patient seen and examined  Labs, recent events, imaging and medications reviewed for this hospital stay  Feels better  No increase in dyspnea  No NV    Review of Systems:   No worsening dyspnea  Feels better  No NV  Occ cough  No dysuria  Otherwise no change      Medications:   Scheduled Meds:   atorvastatin  40 mg Oral Daily    cefTRIAXone (ROCEPHIN) IVPB  1 g Intravenous Q24H    clopidogreL  75 mg Oral Daily    docusate sodium  100 mg Oral BID    enoxaparin  1 mg/kg Subcutaneous Q24H    EScitalopram oxalate  10 mg Oral Daily    levothyroxine  50 mcg Oral Before breakfast    pantoprazole  40 mg Oral BID    ranolazine  500 mg Oral BID    torsemide  20 mg Oral Daily     Continuous Infusions:      Vitals:     Vitals:    01/15/23 1822 01/15/23 1906 01/15/23 2300 01/16/23 0300   BP: 108/67 116/71 117/80 118/76   Pulse: 87 86 98 102   Resp: 17 17 18    Temp: 98.3 °F (36.8 °C) 97.4 °F (36.3 °C) 98 °F (36.7 °C) 97.6 °F (36.4 °C)   TempSrc: Oral  Oral Oral   SpO2: 98% 96% 95% (!) 94%   Weight:       Height:             I/O last 3 completed shifts:  In: 920 [P.O.:920]  Out: 1500 [Urine:1500]    Intake/Output Summary (Last 24 hours) at 1/16/2023 0741  Last data filed at 1/16/2023 0600  Gross per 24 hour   Intake 920 ml   Output 1500 ml   Net -580 ml       Physical Exam:   General: no acute distress, awake, alert  Eyes: PERRLA, EOMI, conjunctiva clear, eyelids without swelling  HENT: atraumatic, oropharynx and nasal mucosa patent  Neck: full ROM, +JVD, no thyromegaly or lymphadenopathy  Respiratory: equal, unlabored, fine rales at bases  Cardiovascular: RRR ++SEMg3/6 LSB, no rub; BL radial and pedal  pulses felt  Edema: none  Gastrointestinal: soft, non-tender, non-distended; positive bowel sounds; no masses to palpation  Musculoskeletal:  ROM without new limitation or discomfort  Integumentary: warm, dry; no rashes, wounds, or skin lesions  Neurological: oriented, appropriate, no acute deficits  :        Labs:     Chemistries:   Recent Labs   Lab 01/09/23  0900 01/10/23  1553 01/11/23  0413 01/12/23  0327 01/14/23  0405 01/16/23  0444      < > 137 137 141 139   K 4.8   < > 4.8 4.2 3.6 3.6   CO2 31   < > 28 28 25 25   BUN 80.9*   < > 73.7* 60.3* 33.9* 22.6*   CREATININE 2.97*   < > 2.51* 2.13* 1.23* 0.96   CALCIUM 10.9*   < > 10.6* 9.3 9.1 9.1   BILITOT 1.2   < > 0.9 0.8 0.6 0.7   ALKPHOS 109   < > 85 79 87 107   ALT 17   < > 12 9 13 22   AST 53*   < > 48* 40* 42* 56*   GLUCOSE 105   < > 82 95 97 100   MG 2.30   < > 2.00 1.80  --  1.60   PHOS 3.9  --  3.3  --   --  2.0*    < > = values in this interval not displayed.        CBC/Anemia Labs: Coags:    Recent Labs   Lab 01/11/23  0413 01/14/23  0405 01/16/23  0444   WBC 7.6 6.4 7.0   HGB 11.6* 10.4* 10.2*   HCT 39.5 35.9* 34.2*    263 242   MCV 79.6* 81.6 80.5    Recent Labs   Lab 01/10/23  1553   INR 1.07          Impression:     Patient Active Problem List   Diagnosis    Peripheral vascular disease, unspecified    Stage 3a chronic kidney disease    HTN (hypertension)    S/P AVR (aortic valve replacement)    Valvular heart disease    Acute kidney injury superimposed on CKD    Hypothyroidism, unspecified    Acute on chronic combined systolic and diastolic heart failure    Weakness   RENÉ--prerenal, resolved  CHF  Cardiorenal disease  UTI  Plan:        Resume torsemide 20mg po daily  Renalwise stable  I can FU as outpt    Antonio Almeida

## 2023-01-17 NOTE — PROGRESS NOTES
C3 nurse attempted to contact patient. The following occurred:   C3 nurse attempted to contact Dee Haji for a TCC post hospital discharge follow up call. The patient is unable to conduct the call @ this time. 's number only one provided in chart. He asked for call back after 4:00pm, as he could not recall patient's phone number at this time, and he will not be home until then.     The patient has a scheduled [facility] appointment with Tierra Johnson MD (Internal Medicine) 1/23/23 @ 11:00am.

## 2023-01-18 NOTE — TELEPHONE ENCOUNTER
Mrs. Hannon called from [facility] to inform us that pt was admitted into the hospital on 01/10. Also states that the patients hand and feet are swollen. Would like to know if there is any medication that can be sen tin for the wet cough the patient has, Riddhi stated that the patient is not strong enough to cough up phlegm.   Callback# [phone]

## 2023-01-23 NOTE — ASSESSMENT & PLAN NOTE
.  Continue follow-up with Nephrology  Follow renoprotective measures including Renal Diet (reduce intake of nuts, peanut butter, milk, cheese, dried beans, peas) and Low Sodium Diet (less than 2 grams per day).  Avoid NSAIDs (Aleve, Mobic, Celebrex, Ibuprofen, Advil, Toradol and Diclofenac). May take Tylenol as needed for headache/pain.  Control DM with goal A1C <7. BP goal <130/80. LDL goal < 100.  Stay well hydrated. Avoid alcohol and soda. Limit tea and coffee.  Smoking Cessation recommended.

## 2023-01-23 NOTE — ASSESSMENT & PLAN NOTE
-patient doing well post discharge,  -medications reviewed and reconciled   -volume status remained stable, very minimal 1+ edema in ankles   -scheduled to see Dr. Almeida with Nephrology in the coming days

## 2023-01-23 NOTE — ASSESSMENT & PLAN NOTE
Well controlled.   Continue spironolactone and torsemide which to affect her blood pressure  Low Sodium Diet (DASH Diet - Less than 2 grams of sodium per day).  Monitor blood pressure daily and log. Report consistent numbers greater than 140/90.  Maintain healthy weight with goal BMI <30. Exercise 30 minutes per day, 5 days per week.  Smoking cessation encouraged to aid in BP reduction.

## 2023-01-23 NOTE — ASSESSMENT & PLAN NOTE
TSH / Free T4 -   Continue levothyroxine 50 mcg p.o. daily  Take medicine on an empty stomach with water (no other medications or beverages). Wait 30 minutes to eat or drink.  Report any symptoms of thinning hair, breaking nails, fatigue, weight gain or loss, palpitations.

## 2023-01-23 NOTE — PROGRESS NOTES
Transitional Care Note  Subjective:   Patient ID: Dee Haji is a 88 y.o. female.    Chief Complaint: Follow-up (Hospital )      Date of Hospital Discharge: 1/16/23   Family and/or Caretaker present at visit?  Yes  Diagnostic tests reviewed/disposition: No diagnosic tests pending after this hospitalization.  Disease/illness education: Performed   Home health/community services discussion/referrals: Patient has home health established at [facility] .   Establishment or re-establishment of referral orders for community resources: No other necessary community resources.   Discussion with other health care providers: No discussion with other health care providers necessary.     HPI: 88 y.o. female patient of Dr Tate; she has a past medical history of Combined systolic and diastolic heart failure, A-fib, CAD (coronary artery disease), CVA (cerebrovascular accident), Dyspnea on exertion, GERD (gastroesophageal reflux disease), HTN (hypertension), Mixed hyperlipidemia, Renal disorder, S/P AVR (aortic valve replacement), Synovial cyst of knee, TIA (transient ischemic attack), Unspecified hemorrhoids, and Unspecified osteoarthritis, unspecified site.  Patient previously seen 12/19/22 for hospital discharge for fluid overload.  At the time, was  discharged on torsemide and had her Diamox discontinued.  She had to be readmitted after her office visit on 01/10/2022 because of worsening fatigue and weakness as well as bouts of nausea.  Her blood pressure was in the 80 systolic range.  Her creatinine had also worsened from 2.9 from 1.6 in the past.  Nephrology was consulted upon admission and patient was gently hydrated.  Her EKG was consistent with a first-degree AV block with regular rhythm and otherwise normal.  An echocardiogram from December 2020 consistent with an EF of 40-45% with severe right ventricular enlargement, bioprosthetic aortic valve and central aortic insufficiency.  At pressure of 60.    After gentle  hydration and diuresis, patient stabilized and was discharged home.  Today she is here for hospital discharge follow-up and seems to be doing much better.  No complaints of excessive shortness of breath.  The swelling on her legs are staying under control as well.  Medication list was reviewed and noted in detail.         Cardiologist: Dr. Payton   GI: Dr. Nunez  Renal: Dr. Almeida          Health maintenance reviewed with the patient.  Health maintenance completed:  Health Maintenance Topics with due status: Not Due       Topic Last Completion Date    Lipid Panel 09/06/2022      Health maintenance due:  Health Maintenance Due   Topic Date Due    Influenza Vaccine (1) 09/01/2022      Review of Systems  A comprehensive review of systems is obtained and is essentially negative except for that stated in the HPI     History:     Past Medical History:   Diagnosis Date    A-fib     A-fib 5/3/2022    CAD (coronary artery disease)     CAD (coronary artery disease) 5/3/2022    CVA (cerebrovascular accident)     Dyspnea on exertion     Gastroesophageal reflux disease 5/3/2022    GERD (gastroesophageal reflux disease)     HTN (hypertension)     Hypothyroidism, unspecified     Mixed hyperlipidemia     Renal disorder     S/P AVR (aortic valve replacement)     Synovial cyst of knee     TIA (transient ischemic attack)     TIA (transient ischemic attack) 5/3/2022    Unspecified hemorrhoids     Unspecified osteoarthritis, unspecified site       Past Surgical History:   Procedure Laterality Date    ANGIOGRAM, CORONARY, WITH LEFT HEART CATHETERIZATION      BUNIONECTOMY Right     CATARACT EXTRACTION Right     CHOLECYSTECTOMY      CORONARY STENT PLACEMENT      ESOPHAGOGASTRODUODENOSCOPY N/A 11/17/2022    Procedure: EGD W/DIL;  Surgeon: CIARA Nunez MD;  Location: [facility] ENDOSCOPY;  Service: Gastroenterology;  Laterality: N/A;  48FR  Garcia    left lower lobectomy Left      Family History   Problem Relation Age of Onset     Heart failure Mother     Cancer Father     Diabetes Sister     Diabetes Brother       Social History     Tobacco Use    Smoking status: Former     Types: Cigarettes     Quit date: 1/3/1972     Years since quittin.0    Smokeless tobacco: Never    Tobacco comments:     quit in    Substance and Sexual Activity    Alcohol use: Not Currently    Drug use: Not Currently    Sexual activity: Not on file      Smoking Cessation:  Counseling given: Not Answered  Tobacco comments: quit in      Non-smoker    Allergies: Review of patient's allergies indicates:  No Known Allergies  Objective:     Vitals:    23 1107   BP: 94/66   Pulse: 97   Temp: 97.6 °F (36.4 °C)   TempSrc: Skin   SpO2: (!) 93%   Weight: 39 kg (86 lb)   Height: 5' (1.524 m)   PainSc: 0-No pain     Body mass index is 16.8 kg/m².   Wt Readings from Last 4 Encounters:   23 39 kg (86 lb)   23 39.2 kg (86 lb 6.7 oz)   01/10/23 (P) 38.4 kg (84 lb 9.6 oz)   22 42.1 kg (92 lb 13 oz)     Weight change: has been stable.    Physical Examination:   Physical Exam  Constitutional:       Appearance: Normal appearance.   HENT:      Head: Normocephalic and atraumatic.      Nose: Nose normal.      Mouth/Throat:      Mouth: Mucous membranes are moist.      Pharynx: Oropharynx is clear.   Eyes:      Extraocular Movements: Extraocular movements intact.      Pupils: Pupils are equal, round, and reactive to light.   Cardiovascular:      Rate and Rhythm: Normal rate and regular rhythm.      Pulses: Normal pulses.   Pulmonary:      Effort: Pulmonary effort is normal.      Breath sounds: Normal breath sounds.   Abdominal:      General: Bowel sounds are normal.      Palpations: Abdomen is soft.   Musculoskeletal:         General: Normal range of motion.      Cervical back: Normal range of motion and neck supple.   Skin:     General: Skin is warm.   Neurological:      General: No focal deficit present.      Mental Status: She is alert and oriented to  person, place, and time. Mental status is at baseline.   Psychiatric:         Mood and Affect: Mood normal.       Diagnostic Tests:  Significant Imaging: I have reviewed and interpreted all pertinent imaging results/findings.  X-Ray Chest AP Portable  Narrative: EXAMINATION:  XR CHEST AP PORTABLE    CLINICAL HISTORY:  weakness;    COMPARISON:  11 December 2022    FINDINGS:  Portable frontal view of the chest was obtained. Heart and mediastinum are unchanged.  Similar appearance of chronic interstitial changes of the lungs with no new focal consolidation.  No pneumothorax.  Impression: No acute findings.  Similar appearance of the chest.    Electronically signed by: David Parker  Date:    01/10/2023  Time:    20:10      Labs:   Lab Results   Component Value Date    WBC 7.0 01/16/2023    RBC 4.25 01/16/2023    HGB 10.2 (L) 01/16/2023    HCT 34.2 (L) 01/16/2023    MCV 80.5 01/16/2023    MCH 24.0 01/16/2023     01/16/2023     01/16/2023    K 3.6 01/16/2023    BUN 22.6 (H) 01/16/2023    CREATININE 0.96 01/16/2023    AST 56 (H) 01/16/2023    ALT 22 01/16/2023    BILITOT 0.7 01/16/2023    TSH 2.2040 12/10/2022    HGBA1C 5.0 01/25/2022        Laboratory Data:  All pertinent labs have been reviewed.  I have reviewed the following records today:     Details:   [x] Labs [] Internal  [] External    [] Micro [] Internal  [] External    [] Pathology [] Internal  [] External    [x] Imaging [] Internal  [] External    [x] Cardiology Procedures [] Internal  [] External    [x] Provider Records [] Internal  [] External    [] Other [] Internal  [] External      Medications:     Medication List with Changes/Refills   Current Medications    CHOLECALCIFEROL, VITAMIN D3, 125 MCG (5,000 UNIT) CAPSULE    Take 5,000 Int'l Units by mouth once daily.    CLOPIDOGREL (PLAVIX) 75 MG TABLET    Take 75 mg by mouth once daily.    DOCUSATE SODIUM (COLACE) 100 MG CAPSULE    Take 100 mg by mouth 2 (two) times daily. prn    ESCITALOPRAM  OXALATE (LEXAPRO) 10 MG TABLET    Take 1 tablet (10 mg total) by mouth once daily.    FENOFIBRATE MICRONIZED (LOFIBRA) 134 MG CAP    Take 134 mg by mouth once daily at 6am.    FOLIC ACID/MULTIVIT-MIN/LUTEIN (CENTRUM SILVER ORAL)    Take 1 tablet by mouth once daily.    HYDROXYZINE HCL (ATARAX) 25 MG TABLET    Take 1 tablet (25 mg total) by mouth every evening.    LEVOTHYROXINE (SYNTHROID) 50 MCG TABLET    Take 1 tablet (50 mcg total) by mouth before breakfast.    NITROGLYCERIN (NITROSTAT) 0.4 MG SL TABLET    Place 0.4 mg under the tongue every 5 (five) minutes as needed. X 3 doses    ONDANSETRON (ZOFRAN) 4 MG TABLET    Take 1 tablet (4 mg total) by mouth every 6 (six) hours as needed for Nausea.    PANTOPRAZOLE (PROTONIX) 40 MG TABLET    Take 40 mg by mouth 2 (two) times daily.    POTASSIUM CHLORIDE SA (K-DUR,KLOR-CON) 20 MEQ TABLET    TAKE 1 TABLET BY MOUTH EVERY DAY    RANOLAZINE (RANEXA) 500 MG TB12    Take 500 mg by mouth 2 (two) times daily.    RIVAROXABAN (XARELTO) 2.5 MG TAB    Take 2.5 mg by mouth 2 (two) times daily.    ROSUVASTATIN (CRESTOR) 40 MG TAB    Take 40 mg by mouth once daily.    SPIRONOLACTONE (ALDACTONE) 25 MG TABLET    Take 1 tablet (25 mg total) by mouth once daily.    SUCRALFATE (CARAFATE) 1 GRAM TABLET    Take 1 g by mouth 3 (three) times daily.    TORSEMIDE (DEMADEX) 20 MG TAB    Take 1 tablet (20 mg total) by mouth once daily.    VIT A/VIT C/VIT E/ZINC/COPPER (PRESERVISION AREDS ORAL)    Take 1 capsule by mouth once daily.     Assessment:     1. S/P AVR (aortic valve replacement)    2. Primary hypertension    3. History of PTCA    4. Paroxysmal atrial fibrillation    5. Stage 3a chronic kidney disease    6. Acquired hypothyroidism    7. Hospital discharge follow-up      Plan:     Problem List Items Addressed This Visit          Cardiac/Vascular    HTN (hypertension) (Chronic)    Current Assessment & Plan     Well controlled.   Continue spironolactone and torsemide which to affect her blood  pressure  Low Sodium Diet (DASH Diet - Less than 2 grams of sodium per day).  Monitor blood pressure daily and log. Report consistent numbers greater than 140/90.  Maintain healthy weight with goal BMI <30. Exercise 30 minutes per day, 5 days per week.  Smoking cessation encouraged to aid in BP reduction.               History of PTCA (Chronic)    S/P AVR (aortic valve replacement) - Primary    RESOLVED: A-fib (Chronic)    Current Assessment & Plan     -anticoagulated with Xarelto, being renally dosed   -rate and rhythm controlled at this time            Renal/    Stage 3a chronic kidney disease (Chronic)    Current Assessment & Plan     .  Continue follow-up with Nephrology  Follow renoprotective measures including Renal Diet (reduce intake of nuts, peanut butter, milk, cheese, dried beans, peas) and Low Sodium Diet (less than 2 grams per day).  Avoid NSAIDs (Aleve, Mobic, Celebrex, Ibuprofen, Advil, Toradol and Diclofenac). May take Tylenol as needed for headache/pain.  Control DM with goal A1C <7. BP goal <130/80. LDL goal < 100.  Stay well hydrated. Avoid alcohol and soda. Limit tea and coffee.  Smoking Cessation recommended.            Endocrine    Hypothyroidism, unspecified    Current Assessment & Plan     TSH / Free T4 -   Continue levothyroxine 50 mcg p.o. daily  Take medicine on an empty stomach with water (no other medications or beverages). Wait 30 minutes to eat or drink.  Report any symptoms of thinning hair, breaking nails, fatigue, weight gain or loss, palpitations.             Other    Hospital discharge follow-up    Current Assessment & Plan     -patient doing well post discharge,  -medications reviewed and reconciled   -volume status remained stable, very minimal 1+ edema in ankles   -scheduled to see Dr. Almeida with Nephrology in the coming days                1.  Continue current medications  2.  Side effects and expected results discussed  3.  Diet and exercise as tolerated  4.  Nutritional  support  5.  Health maintenance updated accordingly  6.  Call with increased complaints or concerns  7.  Volume status noted to be currently stable    Follow Up:   No follow-ups on file.    I spent greater than 45 minutes today both in chart review and greater than 50% of that time in discussion with the patient regarding health maintenance, diagnoses, diagnostic tests, medications, treatments, symptom management, expected results and adverse effects. Patient verbalized understanding and all questions were answered.    This note includes dictation done on [employer] word recognition program.  There are word recognition mistakes that are occasionally missed on review.

## 2023-01-28 NOTE — ED PROVIDER NOTES
Source of History:  Patient, no limitations    Chief complaint:  Fall      HPI:  Dee Haji is a 88 y.o. female presenting with Fall       ***        Review of Systems   Constitutional symptoms:  Negative except as documented in HPI.   Skin symptoms:  Negative except as documented in HPI.   HEENT symptoms:  Negative except as documented in HPI.   Respiratory symptoms:  Negative except as documented in HPI.   Cardiovascular symptoms:  Negative except as documented in HPI.   Gastrointestinal symptoms:  Negative except as documented in HPI.    Genitourinary symptoms:  Negative except as documented in HPI.   Musculoskeletal symptoms:  Negative except as documented in HPI.   Neurologic symptoms:  Negative except as documented in HPI.   Psychiatric symptoms:  Negative except as documented in HPI.   Allergy/immunologic symptoms:  Negative except as documented in HPI.             Additional review of systems information: All other systems reviewed and otherwise negative.  ***    Review of patient's allergies indicates:  No Known Allergies    PMH:  As per HPI and below:    Past Medical History:   Diagnosis Date    A-fib     A-fib 5/3/2022    CAD (coronary artery disease)     CAD (coronary artery disease) 5/3/2022    CVA (cerebrovascular accident)     Dyspnea on exertion     Gastroesophageal reflux disease 5/3/2022    GERD (gastroesophageal reflux disease)     HTN (hypertension)     Hypothyroidism, unspecified     Mixed hyperlipidemia     Renal disorder     S/P AVR (aortic valve replacement)     Synovial cyst of knee     TIA (transient ischemic attack)     TIA (transient ischemic attack) 5/3/2022    Unspecified hemorrhoids     Unspecified osteoarthritis, unspecified site         Family History   Problem Relation Age of Onset    Heart failure Mother     Cancer Father     Diabetes Sister     Diabetes Brother        Past Surgical History:   Procedure Laterality Date    ANGIOGRAM, CORONARY, WITH LEFT HEART  CATHETERIZATION      BUNIONECTOMY Right     CATARACT EXTRACTION Right     CHOLECYSTECTOMY      CORONARY STENT PLACEMENT      ESOPHAGOGASTRODUODENOSCOPY N/A 2022    Procedure: EGD W/DIL;  Surgeon: CIARA Nunez MD;  Location: [facility] ENDOSCOPY;  Service: Gastroenterology;  Laterality: N/A;  48FR  Garcia    left lower lobectomy Left        Social History     Tobacco Use    Smoking status: Former     Types: Cigarettes     Quit date: 1/3/1972     Years since quittin.1    Smokeless tobacco: Never    Tobacco comments:     quit in    Substance Use Topics    Alcohol use: Not Currently    Drug use: Not Currently       Patient Active Problem List   Diagnosis    CAD in native artery    Peripheral vascular disease, unspecified    Stage 3a chronic kidney disease    HTN (hypertension)    S/P AVR (aortic valve replacement)    Valvular heart disease    Acute kidney injury superimposed on CKD    Hospital discharge follow-up    Hypothyroidism, unspecified    Acute on chronic combined systolic and diastolic heart failure    Weakness    History of PTCA        Physical Exam:    /74 (Patient Position: Lying)   Pulse 81   Temp 97.7 °F (36.5 °C) (Oral)   Resp 20   Wt 43.1 kg (95 lb)   LMP  (LMP Unknown)   SpO2 97%   BMI 18.55 kg/m²     Nursing note and vital signs reviewed.    General:  Alert, no acute distress.   Skin: Normal for Ethnic Origin, No cyanosis  HEENT: Normocephalic and atraumatic, Vision unchanged, Pupils symmetric, No icterus , Nasal mucosa is pink and moist  Cardiovascular:  Regular rate and rhythm, No edema  Chest Wall: No deformity, equal chest rise  Respiratory:  Lungs are clear to auscultation, respirations are non-labored.    Musculoskeletal:  No deformity, Normal perfusion to all extremities  Gastrointestinal:  Soft, Non distended  Neurological:  Alert and oriented, normal motor observed, normal speech observed.    Psychiatric:  Cooperative, appropriate mood &  affect.    ***    Labs that have been ordered have been independently reviewed and interpreted by myself.     Old Chart Reviewed.      Initial Impression/ Differential Dx:  ***    MDM:      Reviewed Nurses Note.    Reviewed Pertinent old records.                      Labs Reviewed - No data to display       No orders to display        No visits with results within 1 Day(s) from this visit.   Latest known visit with results is:   Lab Requisition on 01/25/2023   Component Date Value Ref Range Status    Sodium Level 01/25/2023 136  136 - 145 mmol/L Final    Potassium Level 01/25/2023 5.2 (H)  3.5 - 5.1 mmol/L Final    Chloride 01/25/2023 98  98 - 107 mmol/L Final    Carbon Dioxide 01/25/2023 28  23 - 31 mmol/L Final    Glucose Level 01/25/2023 81 (L)  82 - 115 mg/dL Final    Blood Urea Nitrogen 01/25/2023 41.5 (H)  9.8 - 20.1 mg/dL Final    Creatinine 01/25/2023 1.65 (H)  0.55 - 1.02 mg/dL Final    Calcium Level Total 01/25/2023 10.4 (H)  8.4 - 10.2 mg/dL Final    Protein Total 01/25/2023 6.9  5.8 - 7.6 gm/dL Final    Albumin Level 01/25/2023 3.3 (L)  3.4 - 4.8 g/dL Final    Globulin 01/25/2023 3.6 (H)  2.4 - 3.5 gm/dL Final    Albumin/Globulin Ratio 01/25/2023 0.9 (L)  1.1 - 2.0 ratio Final    Bilirubin Total 01/25/2023 1.1  <=1.5 mg/dL Final    Alkaline Phosphatase 01/25/2023 145  40 - 150 unit/L Final    Alanine Aminotransferase 01/25/2023 18  0 - 55 unit/L Final    Aspartate Aminotransferase 01/25/2023 53 (H)  5 - 34 unit/L Final    eGFR 01/25/2023 30  mls/min/1.73/m2 Final    Magnesium Level 01/25/2023 1.90  1.60 - 2.60 mg/dL Final    Phosphorus Level 01/25/2023 3.4  2.3 - 4.7 mg/dL Final    Parathyroid Hormone Intact 01/25/2023 66.9  8.7 - 77.0 pg/mL Final    WBC 01/25/2023 7.2  4.5 - 11.5 x10(3)/mcL Final    RBC 01/25/2023 4.77  4.20 - 5.40 x10(6)/mcL Final    Hgb 01/25/2023 11.5 (L)  12.0 - 16.0 gm/dL Final    Hct 01/25/2023 40.1  37.0 - 47.0 % Final    MCV 01/25/2023 84.1  80.0 - 94.0 fL Final    MCH  01/25/2023 24.1  pg Final    MCHC 01/25/2023 28.7 (L)  33.0 - 36.0 mg/dL Final    RDW 01/25/2023    Final    Platelet 01/25/2023 304  130 - 400 x10(3)/mcL Final    MPV 01/25/2023    Final    Neut % 01/25/2023 76.8  % Final    Lymph % 01/25/2023 14.8  % Final    Mono % 01/25/2023 6.5  % Final    Eos % 01/25/2023 1.1  % Final    Basophil % 01/25/2023 0.7  % Final    Lymph # 01/25/2023 1.07  0.6 - 4.6 x10(3)/mcL Final    Neut # 01/25/2023 5.54  2.1 - 9.2 x10(3)/mcL Final    Mono # 01/25/2023 0.47  0.1 - 1.3 x10(3)/mcL Final    Eos # 01/25/2023 0.08  0 - 0.9 x10(3)/mcL Final    Baso # 01/25/2023 0.05  0 - 0.2 x10(3)/mcL Final    IG# 01/25/2023 0.01  0 - 0.04 x10(3)/mcL Final    IG% 01/25/2023 0.1  % Final    NRBC% 01/25/2023 0.0  % Final       Imaging Results    None                                              Diagnostic Impression:    No diagnosis found.

## 2023-01-28 NOTE — ED PROVIDER NOTES
Encounter Date: 1/27/2023       History     Chief Complaint   Patient presents with    Fall     88yoWF presents to ER via EMS in c-collar after fall from standing height just prior to arrival. Patient using bathroom at home, spun around, lost balance and fell. Denies dizziness, chest pain or SOB. Here today with c/o midback and low back pain. Denies bowel or bladder incontinence. Denies radiating symptoms.Patient is on Plavix and Eliquis. PMH remarkable for HTN and CAD. She ambulates with rolling walker at baseline. at bedside.     The history is provided by the patient and the EMS personnel. History limited by: ASL. No  was used.   Fall  She fell from a height of 1 to 2 ft. She landed on [place]. The point of impact was the buttocks and neck. The pain is present in the back. The pain is at a severity of 7/10. She was Not ambulatory at the scene. There was No entrapment after the fall. There was No drug use involved in the accident. There was No alcohol use involved in the accident. Associated symptoms include neck pain and back pain. Pertinent negatives include no bowel incontinence and no loss of consciousness. The symptoms are aggravated by activity. Treatment on scene includes A c-collar. She has tried nothing for the symptoms. The treatment provided no relief.   Review of patient's allergies indicates:  No Known Allergies  Past Medical History:   Diagnosis Date    A-fib     A-fib 5/3/2022    CAD (coronary artery disease)     CAD (coronary artery disease) 5/3/2022    CVA (cerebrovascular accident)     Dyspnea on exertion     Gastroesophageal reflux disease 5/3/2022    GERD (gastroesophageal reflux disease)     HTN (hypertension)     Hypothyroidism, unspecified     Mixed hyperlipidemia     Renal disorder     S/P AVR (aortic valve replacement)     Synovial cyst of knee     TIA (transient ischemic attack)     TIA (transient ischemic attack) 5/3/2022    Unspecified hemorrhoids      Unspecified osteoarthritis, unspecified site      Past Surgical History:   Procedure Laterality Date    ANGIOGRAM, CORONARY, WITH LEFT HEART CATHETERIZATION      BUNIONECTOMY Right     CATARACT EXTRACTION Right     CHOLECYSTECTOMY      CORONARY STENT PLACEMENT      ESOPHAGOGASTRODUODENOSCOPY N/A 2022    Procedure: EGD W/DIL;  Surgeon: CIARA Nunez MD;  Location: [facility] ENDOSCOPY;  Service: Gastroenterology;  Laterality: N/A;  48FR  Garcia    left lower lobectomy Left      Family History   Problem Relation Age of Onset    Heart failure Mother     Cancer Father     Diabetes Sister     Diabetes Brother      Social History     Tobacco Use    Smoking status: Former     Types: Cigarettes     Quit date: 1/3/1972     Years since quittin.1    Smokeless tobacco: Never    Tobacco comments:     quit in    Substance Use Topics    Alcohol use: Not Currently    Drug use: Not Currently     Review of Systems   Gastrointestinal:  Negative for bowel incontinence.   Musculoskeletal:  Positive for back pain, gait problem and neck pain.        Ambulates with rolling walker at baseline   Neurological:  Negative for loss of consciousness.   All other systems reviewed and are negative.    Physical Exam     Initial Vitals [23]   BP Pulse Resp Temp SpO2   121/74 81 20 97.7 °F (36.5 °C) 97 %      MAP       --         Physical Exam    Vitals reviewed.  Constitutional: She appears well-developed and well-nourished. No distress.   HENT:   Head: Normocephalic and atraumatic.   Eyes: Conjunctivae, EOM and lids are normal. Pupils are equal, round, and reactive to light.   Neck: Neck supple.   C-collar in place upon arrival   Cardiovascular:  Normal rate and regular rhythm.           Pulmonary/Chest: Effort normal and breath sounds normal.   Abdominal: Abdomen is soft and flat. Bowel sounds are normal.   Musculoskeletal:      Cervical back: Neck supple.      Comments: CN grossly intact.PERRLA. unable to assess  neck ROM due to c-collar. 5/5 muscle strength in bilateral deltoids, biceps, triceps, interossei, brachioradialis, iliopsoas, quadriceps, dorsiflexion,plantar flexion, inversion, eversion, and hamstring function. Intact dermatomes in upper and lower extremities. 1-2+ DTRs bilaterally.        Neurological: She is alert and oriented to person, place, and time. She has normal strength. She is not disoriented. No cranial nerve deficit or sensory deficit. GCS eye subscore is 4. GCS verbal subscore is 5. GCS motor subscore is 6.   Skin: Skin is warm and intact.   Psychiatric: She has a normal mood and affect. Her speech is normal and behavior is normal. Judgment and thought content normal. Cognition and memory are normal.       ED Course   Procedures  Labs Reviewed - No data to display       Imaging Results              CT Chest Abdomen Pelvis Without Contrast (XPD) (Final result)  Result time 01/27/23 21:35:58      Final result by Donny Crespo MD (01/27/23 21:35:58)                   Impression:      1. Acute fractures of the right L2 and L3 transverse processes.  2. Acute appearing L3 vertebral body superior endplate compression deformity.  3. Age-indeterminate mild loss of height along the T2 superior endplate.  4. Small bilateral pleural effusions.      Electronically signed by: Donny Crespo MD  Date:    01/27/2023  Time:    21:35               Narrative:    EXAMINATION:  CT chest, abdomen and pelvis    CLINICAL HISTORY:  Fall    TECHNIQUE:  Axial CT images were obtained through the chest, abdomen and pelvis without contrast .  Coronal and sagittal reconstructions submitted and interpreted.  Total .  Automated exposure control utilized.    COMPARISON:  None.    CT CHEST:  Evaluation for traumatic injury is limited due to lack of contrast.    No focal consolidation or pneumothorax.  Small bilateral effusions are present with patchy consolidation in the right lung base.    Heart is enlarged.  Aorta is  partially calcified.  Central airways are clear.    There is a chronic deformity of the left scapular body.  Mild loss of height is along the superior T2 endplate with some sclerosis.  No definite fracture is identified.    CT abdomen and pelvis:    Gallbladder is absent.  Spleen, pancreas and adrenals are normal on this noncontrast study.  No hydronephrosis.    Stomach is decompressed.  No dilated bowel loops.  No free fluid or free air.    Urinary bladder is normal.  Uterus and adnexa are unremarkable.    Abdominal aorta is partially calcified.  Bilateral iliac stents are present.    There are acute nondisplaced fractures of the right L2 and L3 transverse processes.  There is an age-indeterminate compression deformity of the L3 vertebral body superior endplate with 4 mm of posterosuperior endplate bony retropulsion.  There is an associated 35% loss of height.                                       CT Cervical Spine Without Contrast (Final result)  Result time 01/27/23 21:26:00      Final result by Donny Crespo MD (01/27/23 21:26:00)                   Impression:      No CT evidence of acute cervical spine injury      Electronically signed by: Donny Crespo MD  Date:    01/27/2023  Time:    21:26               Narrative:    EXAMINATION:  CT CERVICAL SPINE WITHOUT CONTRAST    CLINICAL HISTORY:  Fall    TECHNIQUE:  Axial CT images were obtained through the cervical spine without contrast.    Coronal and sagittal reconstructions submitted and interpreted.  Total .  Automated exposure control utilized.    COMPARISON:  12/20/2021    FINDINGS:  No acute fracture or traumatic subluxation.  Vertebral bodies are normal in height.  Anterolisthesis C4 on C5 is stable.  Multilevel facet hypertrophy and disc space narrowing is present.    Prevertebral soft tissues are normal.  Lung apices are clear.                                       CT Head Without Contrast (Final result)  Result time 01/27/23 21:25:05      Final  result by Donny Crespo MD (01/27/23 21:25:05)                   Impression:      No acute intracranial findings.      Electronically signed by: Donny Crespo MD  Date:    01/27/2023  Time:    21:25               Narrative:    EXAMINATION:  CT HEAD WITHOUT CONTRAST    CLINICAL HISTORY:  Fall    TECHNIQUE:  Axial CT images were obtained through the head without contrast.  Coronal and sagittal reformations were also performed.  Total .  Automated exposure control utilized.    COMPARISON:  12/20/2021    FINDINGS:  No hemorrhage, mass effect or CT evidence of acute cortical infarction.  There are chronic infarcts in the right cerebellum and left parietal lobe.  White-matter hypodensities are nonspecific but likely related to small vessel ischemic disease.  Ventricles and sulci are proportionate.    No abnormal extra-axial fluid collections.    No hyperdense artery or vein.  Intracranial arteries are partially calcified.    No skull fracture or aggressive osseous process.  Visualized paranasal sinuses and mastoid air cells are clear.                                       Medications   morphine injection 2 mg (2 mg Intravenous Given 1/27/23 2048)   ondansetron injection 4 mg (4 mg Intravenous Given 1/27/23 2048)     Medical Decision Making:   Initial Assessment:   88yoWF presents to ER via EMS in [facility] after fall from standing height just prior to arrival. Patient using bathroom at home, spun around, lost balance and fell. Denies dizziness, chest pain or SOB. Here today with c/o midback and low back pain. Denies bowel or bladder incontinence. Denies radiating symptoms.Patient is on Plavix and Eliquis. PMH remarkable for HTN and CAD. She ambulates with rolling walker at baseline. at bedside.   Differential Diagnosis:   Lumbar fracture v lumbar contusion   Clinical Tests:   Radiological Study: Ordered and Reviewed  ED Management:  Multiple lumbar fractures noted on CT. We will br treating with TLSO brace  and pain meds at home. Divine wll f/u with neurosurgery for close follow up. Patient and family members present v/u of this plan. She can d/c to home and WBAT with rolling walker. She may continue to work with PT.                         Clinical Impression:   Final diagnoses:  [S32.030A] Compression fracture of L3 vertebra, initial encounter (Primary)  [S32.008A] Other closed fracture of lumbar vertebra, unspecified lumbar vertebral level, initial encounter        ED Disposition Condition    Discharge Stable          ED Prescriptions       Medication Sig Dispense Start Date End Date Auth. Provider    acetaminophen-codeine 300-30mg (TYLENOL #3) 300-30 mg Tab Take 1 tablet by mouth every 6 (six) hours as needed (PAIN). 20 tablet 1/27/2023 2/1/2023 YAIR Friend          Follow-up Information       Follow up With Specialties Details Why Contact Info    Tierra Johnson MD Internal Medicine  As needed, If symptoms worsen [address] [place] [id number]  [phone]               YAIR Friend  01/27/23 [id number]

## 2023-01-28 NOTE — ED TRIAGE NOTES
Pt states she lost her balance after using the bathroom and fell and hit her back. Pt denies hitting her head and LOC. Pt c/o back pain 6/10.

## 2023-01-31 NOTE — PROGRESS NOTES
[facility] Nephrology Ambulatory Progress Note      HPI  Dee Haji, 88 y.o. female, presents to office for a follow up visit for CKD3b related to cardiorenal disease. Overall she feels okay. She has lost all of the volume overload she presented with at last visit with increase in diuretics. She denies SOB, dysuria, or edema.    Medical Diagnoses:      Diagnosis Date    A-fib     A-fib 5/3/2022    CAD (coronary artery disease)     CAD (coronary artery disease) 5/3/2022    CVA (cerebrovascular accident)     Dyspnea on exertion     Gastroesophageal reflux disease 5/3/2022    GERD (gastroesophageal reflux disease)     HTN (hypertension)     Hypothyroidism, unspecified     Mixed hyperlipidemia     Renal disorder     S/P AVR (aortic valve replacement)     Synovial cyst of knee     TIA (transient ischemic attack)     TIA (transient ischemic attack) 5/3/2022    Unspecified hemorrhoids     Unspecified osteoarthritis, unspecified site      Patient Active Problem List   Diagnosis    CAD in native artery    Peripheral vascular disease, unspecified    Stage 3a chronic kidney disease    HTN (hypertension)    S/P AVR (aortic valve replacement)    Valvular heart disease    Acute kidney injury superimposed on CKD    Hospital discharge follow-up    Hypothyroidism, unspecified    Acute on chronic combined systolic and diastolic heart failure    Weakness    History of PTCA       Surgical History:   Past Surgical History:   Procedure Laterality Date    ANGIOGRAM, CORONARY, WITH LEFT HEART CATHETERIZATION      BUNIONECTOMY Right     CATARACT EXTRACTION Right     CHOLECYSTECTOMY      CORONARY STENT PLACEMENT      ESOPHAGOGASTRODUODENOSCOPY N/A 11/17/2022    Procedure: EGD W/DIL;  Surgeon: CIARA Nunez MD;  Location: [facility] ENDOSCOPY;  Service: Gastroenterology;  Laterality: N/A;  48FR  Garcia    left lower lobectomy Left        Family History:   Family History   Problem Relation Age of Onset    Heart failure Mother      Cancer Father     Diabetes Sister     Diabetes Brother        Social History:   Social History     Tobacco Use    Smoking status: Former     Types: Cigarettes     Quit date: 1/3/1972     Years since quittin.1    Smokeless tobacco: Never    Tobacco comments:     quit in    Substance Use Topics    Alcohol use: Not Currently       Allergies:  Review of patient's allergies indicates:  No Known Allergies    Medications:    Current Outpatient Medications:     acetaminophen-codeine 300-30mg (TYLENOL #3) 300-30 mg Tab, Take 1 tablet by mouth every 6 (six) hours as needed (PAIN)., Disp: 20 tablet, Rfl: 0    cholecalciferol, vitamin D3, 125 mcg (5,000 unit) capsule, Take 5,000 Int'l Units by mouth once daily., Disp: , Rfl:     clopidogreL (PLAVIX) 75 mg tablet, Take 75 mg by mouth once daily., Disp: , Rfl:     docusate sodium (COLACE) 100 MG capsule, Take 100 mg by mouth 2 (two) times daily. prn, Disp: , Rfl:     EScitalopram oxalate (LEXAPRO) 10 MG tablet, Take 1 tablet (10 mg total) by mouth once daily., Disp: 30 tablet, Rfl: 6    fenofibrate micronized (LOFIBRA) 134 MG Cap, Take 134 mg by mouth once daily at 6am., Disp: , Rfl:     folic acid/multivit-min/lutein (CENTRUM SILVER ORAL), Take 1 tablet by mouth once daily., Disp: , Rfl:     hydrOXYzine HCL (ATARAX) 25 MG tablet, Take 1 tablet (25 mg total) by mouth every evening., Disp: 30 tablet, Rfl: 5    levothyroxine (SYNTHROID) 50 MCG tablet, Take 1 tablet (50 mcg total) by mouth before breakfast., Disp: 30 tablet, Rfl: 11    nitroGLYCERIN (NITROSTAT) 0.4 MG SL tablet, Place 0.4 mg under the tongue every 5 (five) minutes as needed. X 3 doses, Disp: , Rfl:     ondansetron (ZOFRAN) 4 MG tablet, Take 1 tablet (4 mg total) by mouth every 6 (six) hours as needed for Nausea., Disp: 24 tablet, Rfl: 0    pantoprazole (PROTONIX) 40 MG tablet, Take 40 mg by mouth 2 (two) times daily., Disp: , Rfl:     potassium chloride SA (K-DUR,KLOR-CON) 20 MEQ tablet, TAKE 1 TABLET BY  MOUTH EVERY DAY, Disp: 90 tablet, Rfl: 0    ranolazine (RANEXA) 500 MG Tb12, Take 500 mg by mouth 2 (two) times daily., Disp: , Rfl:     rivaroxaban (XARELTO) 2.5 mg Tab, Take 2.5 mg by mouth 2 (two) times daily., Disp: , Rfl:     rosuvastatin (CRESTOR) 40 MG Tab, Take 40 mg by mouth once daily., Disp: , Rfl:     spironolactone (ALDACTONE) 25 MG tablet, Take 1 tablet (25 mg total) by mouth once daily., Disp: 30 tablet, Rfl: 11    sucralfate (CARAFATE) 1 gram tablet, Take 1 g by mouth 3 (three) times daily., Disp: , Rfl:     torsemide (DEMADEX) 20 MG Tab, Take 1 tablet (20 mg total) by mouth once daily., Disp: 30 tablet, Rfl: 11    vit A/vit C/vit E/zinc/copper (PRESERVISION AREDS ORAL), Take 1 capsule by mouth once daily., Disp: , Rfl:        Review of Systems:    Constitutional: Denies fever, fatigue; +generalized weakness  Skin: Denies wounds, no rashes, no itching, no new skin lesions  Respiratory:  Denies cough, shortness of breath, or wheezing  Cardiovascular: Denies chest pain, palpitations, or swelling  Gastrointestional: Denies abdominal pain, nausea, vomiting, diarrhea, or constipation; +appetite improving  Genitourinary: Denies dysuria, hematuria, foamy urine, or incontinence; reports able to empty bladder  Musculoskeletal: wheelchair  Neurological: Denies headaches, dizziness, paresthesias, tremors or focal weakness      Vital Signs:  /80 (BP Location: Left arm, Patient Position: Sitting)   Pulse 87   Temp 97.3 °F (36.3 °C) (Temporal)   Resp 20   Ht 5' (1.524 m)   Wt 38.3 kg (84 lb 7 oz)   LMP  (LMP Unknown)   SpO2 96%   BMI 16.49 kg/m²   Body mass index is 16.49 kg/m².      Physical Exam:    General: no acute distress, awake, alert  Eyes: PERRLA, conjunctiva clear, eyelids without swelling  HENT: atraumatic, oropharynx and nasal mucosa patent  Neck: supple, trache midline, full ROM, no JVD, no thyromegaly or lymphadenopathy  Respiratory: equal, unlabored, clear to auscultation  A/P  Cardiovascular: RRR with +RANDALL 2/6 rub; radial and pedal pulses +2 BL  Edema: none  Gastrointestinal: soft, non-tender, non-distended; positive bowel sounds; no masses to palpation; no ascites  Musculoskeletal: wheelchair  Integumentary: warm, dry; no rashes, wounds, or skin lesions  Neurological: oriented x4, appropriate, no acute deficits; no asterixis      Labs:        Component Value Date/Time     01/25/2023 0900     01/16/2023 0444    K 5.2 (H) 01/25/2023 0900    K 3.6 01/16/2023 0444    CO2 28 01/25/2023 0900    CO2 25 01/16/2023 0444    BUN 41.5 (H) 01/25/2023 0900    BUN 22.6 (H) 01/16/2023 0444    CREATININE 1.65 (H) 01/25/2023 0900    CREATININE 0.96 01/16/2023 0444    CREATININE 1.23 (H) 01/14/2023 0405    CREATININE 2.13 (H) 01/12/2023 0327    CALCIUM 10.4 (H) 01/25/2023 0900    CALCIUM 9.1 01/16/2023 0444    PHOS 3.4 01/25/2023 0900    PHOS 2.0 (L) 01/16/2023 0444    PTH 66.9 01/25/2023 0900    PTH 60.9 01/16/2023 0444    PTH 83.5 (H) 01/09/2023 0900             Component Value Date/Time    WBC 7.2 01/25/2023 0900    WBC 7.0 01/16/2023 0444    HGB 11.5 (L) 01/25/2023 0900    HGB 10.2 (L) 01/16/2023 0444    HCT 40.1 01/25/2023 0900    HCT 34.2 (L) 01/16/2023 0444     01/25/2023 0900     01/16/2023 0444       Impression:    1. Chronic combined systolic and diastolic congestive heart failure  Patient is maintained on diuretics; see med list      2. Cardiorenal disease  Renal function fluctuates with cardiac output and diuresis.      3. Stage 3b chronic kidney disease  Stable; Related to severe cardiorenal disease.  Renal function fluctuates with cardiac output and diuresis. Patient's baseline creatinine ranges from approximately 1.4-2.0. She appears intravascularly depleted; Will DC ALDACTONE; change torsemide to every other day      4. Primary hypertension  At target. Has been maintained on Aldactone and torsemide for diuresis.  No longer taking Benicar or HCTZ. Will DC  aldactone and change torsemide to every other day   5. Hyperkalemia; DC aldactone;cont potassium supplement due to continuing torsemide         Plan:      Follow up in 3 weeks with labs within the week before.    Patient to call our office with any concerns prior to next appointment.    Avoid NSAIDs (Aleve, Mobic, Celebrex, Ibuprofen, Advil, Toradol and Diclofenac).  Only take tylenol occasionally if needed for aches/pains.    Educated on low sodium diet:  Avoid high salt foods (olives, pickles, smoked meats, deli meats, salted potato chips, etc.).   Do not add salt to your food at the table.   Use only small amounts of salt when cooking.    Recommended Daily Protein Intake for chronic kidney disease:  0.8 g/kg    Avoid excessive intake of high potassium foods  Bananas, oranges, watermelon, tomatoes, potatoes, or salt substitutes    ANA LAURA Vazquez

## 2023-02-01 PROBLEM — I62.00 SUBDURAL HEMORRHAGE: Status: ACTIVE | Noted: 2023-01-01

## 2023-02-01 NOTE — ED TRIAGE NOTES
C/o posterior head pain s/p fall from standing at 1 PM today. Pt has bump to posterior head. Denies LOC, but did get dizzy immediately after hitting head. Denies blurry vision , denies nausea. Takes Plavix

## 2023-02-01 NOTE — ED NOTES
Pt arrives to [facility] main ER for transfer from [facility] ER for eval for subdural bleed.  Pt GCS 15.  Recent falls

## 2023-02-01 NOTE — Clinical Note
Diagnosis: Subdural hematoma [[id number]]   Admitting Provider:: DANO SILVA [[id number]]   Future Attending Provider: DANO SILVA [[id number]]   Reason for IP Medical Treatment  (Clinical interventions that can only be accomplished in the IP setting? ) :: comfort care   Estimated Length of Stay:: 2 midnights   I certify that Inpatient services for greater than or equal to 2 midnights are medically necessary:: Yes   Plans for Post-Acute care--if anticipated (pick the single best option):: A. No post acute care anticipated at this time   Special Needs:: No Special Needs [1]

## 2023-02-01 NOTE — ED PROVIDER NOTES
Encounter Date: 2/1/2023       History     Chief Complaint   Patient presents with    Fall     C/o posterior head pain s/p fall from standing at 1 PM today. Pt has bump to posterior head. Denies LOC, but did get dizzy immediately after hitting head. Denies blurry vision , denies nausea. Takes Plavix     88 y.o. White female with a history of a-fib, CAD, and hypertension presents to Emergency Department with a chief complaint of fall. Symptoms began today and have been constant since onset. Patient reports while standing she fell backwards and hit her head on the coffee table. Associated symptoms include neck pain, R shoulder pain, and hematoma to posterior scalp. Symptoms are aggravated with movement and there are no alleviating factors. The patient denies LOC, CP, SOB, weakness, fever, or abdominal pain. Patient reports she is on blood thinners. No other reported symptoms at this time.       The history is provided by the patient and the spouse. No  was used.   Fall  The accident occurred just prior to arrival. The fall occurred while standing. The volume of blood lost was minimal. The point of impact was the head. The pain is present in the neck, head and right shoulder. She was Ambulatory at the scene. There was No entrapment after the fall. There was No drug use involved in the accident. There was No alcohol use involved in the accident. Associated symptoms include neck pain and headaches. Pertinent negatives include no back pain, no paresthesias, no paralysis, no visual change, no fever, no abdominal pain, no bowel incontinence, no nausea, no vomiting, no loss of consciousness and no tingling. The symptoms are aggravated by activity. She has tried nothing for the symptoms.   Review of patient's allergies indicates:  No Known Allergies  Past Medical History:   Diagnosis Date    A-fib     A-fib 5/3/2022    CAD (coronary artery disease)     CAD (coronary artery disease) 5/3/2022    CVA  (cerebrovascular accident)     Dyspnea on exertion     Gastroesophageal reflux disease 5/3/2022    GERD (gastroesophageal reflux disease)     HTN (hypertension)     Hypothyroidism, unspecified     Mixed hyperlipidemia     Renal disorder     S/P AVR (aortic valve replacement)     Synovial cyst of knee     TIA (transient ischemic attack)     TIA (transient ischemic attack) 5/3/2022    Unspecified hemorrhoids     Unspecified osteoarthritis, unspecified site      Past Surgical History:   Procedure Laterality Date    ANGIOGRAM, CORONARY, WITH LEFT HEART CATHETERIZATION      BUNIONECTOMY Right     CATARACT EXTRACTION Right     CHOLECYSTECTOMY      CORONARY STENT PLACEMENT      ESOPHAGOGASTRODUODENOSCOPY N/A 2022    Procedure: EGD W/DIL;  Surgeon: CIARA Nunez MD;  Location: [facility] ENDOSCOPY;  Service: Gastroenterology;  Laterality: N/A;  48FR  Garcia    left lower lobectomy Left      Family History   Problem Relation Age of Onset    Heart failure Mother     Cancer Father     Diabetes Sister     Diabetes Brother      Social History     Tobacco Use    Smoking status: Former     Types: Cigarettes     Quit date: 1/3/1972     Years since quittin.1    Smokeless tobacco: Never    Tobacco comments:     quit in    Substance Use Topics    Alcohol use: Not Currently    Drug use: Not Currently     Review of Systems   Constitutional:  Negative for appetite change, fatigue and fever.   Eyes:  Negative for photophobia and visual disturbance.   Respiratory:  Negative for shortness of breath, wheezing and stridor.    Cardiovascular:  Negative for chest pain and leg swelling.   Gastrointestinal:  Negative for abdominal pain, bowel incontinence, nausea and vomiting.   Musculoskeletal:  Positive for arthralgias and neck pain. Negative for back pain and joint swelling.   Skin:  Positive for color change and wound.   Neurological:  Positive for headaches. Negative for dizziness, tingling, loss of consciousness,  syncope, weakness and paresthesias.   All other systems reviewed and are negative.    Physical Exam     Initial Vitals [02/01/23 1409]   BP Pulse Resp Temp SpO2   117/67 91 18 98.6 °F (37 °C) 98 %      MAP       --         Physical Exam    Nursing note and vitals reviewed.  Constitutional: Vital signs are normal. She is not diaphoretic. She is cooperative.  Non-toxic appearance. No distress.   Frail appearing elderly woman with TLSO brace in place.    HENT:   Head: Normocephalic. Head is with abrasion and with contusion.   Right Ear: External ear normal.   Left Ear: External ear normal.   Nose: Nose normal.   Mouth/Throat: Oropharynx is clear and moist. No oropharyngeal exudate.   Hematoma and abrasion noted to posterior scalp; scant amount of bleeding noted.    Eyes: Conjunctivae and EOM are normal. Pupils are equal, round, and reactive to light. Right eye exhibits no discharge. Left eye exhibits no discharge.   Neck: Neck supple. No JVD present.   Normal range of motion.  Cardiovascular:  Normal rate and intact distal pulses.           Pulmonary/Chest: Effort normal and breath sounds normal. No accessory muscle usage or stridor. No tachypnea. No respiratory distress. She has no decreased breath sounds. She has no wheezes. She has no rhonchi. She has no rales. She exhibits no tenderness.   Abdominal: Abdomen is soft. Bowel sounds are normal. She exhibits no distension. There is no abdominal tenderness. There is no guarding.   Musculoskeletal:         General: Tenderness present. No edema. Normal range of motion.      Cervical back: Normal range of motion and neck supple.     Neurological: She is alert and oriented to person, place, and time. She has normal strength. No sensory deficit. GCS score is 15. GCS eye subscore is 4. GCS verbal subscore is 5. GCS motor subscore is 6.   Skin: Skin is warm and dry. Capillary refill takes less than 2 seconds. There is erythema.   Psychiatric: She has a normal mood and affect.  Thought content normal.       ED Course   Procedures  Labs Reviewed   COMPREHENSIVE METABOLIC PANEL - Abnormal; Notable for the following components:       Result Value    Potassium Level 5.2 (*)     Chloride 96 (*)     Glucose Level 144 (*)     Blood Urea Nitrogen 62.3 (*)     Creatinine 3.02 (*)     Calcium Level Total 10.9 (*)     Protein Total 8.2 (*)     Globulin 4.5 (*)     Albumin/Globulin Ratio 0.8 (*)     Aspartate Aminotransferase 71 (*)     All other components within normal limits   APTT - Abnormal; Notable for the following components:    PTT 38.9 (*)     All other components within normal limits   PROTIME-INR - Abnormal; Notable for the following components:    PT 18.6 (*)     INR 1.59 (*)     All other components within normal limits   CBC WITH DIFFERENTIAL - Abnormal; Notable for the following components:    WBC 11.9 (*)     MCHC 30.8 (*)     RDW 25.1 (*)     Platelet 402 (*)     Neut # 9.65 (*)     All other components within normal limits   COVID/FLU A&B PCR - Normal    Narrative:     The Xpert Xpress SARS-CoV-2/FLU/RSV plus is a rapid, multiplexed real-time PCR test intended for the simultaneous qualitative detection and differentiation of SARS-CoV-2, Influenza A, Influenza B, and respiratory syncytial virus (RSV) viral RNA in either nasopharyngeal swab or nasal swab specimens.         CBC W/ AUTO DIFFERENTIAL    Narrative:     The following orders were created for panel order CBC auto differential.  Procedure                               Abnormality         Status                     ---------                               -----------         ------                     CBC with Differential[[id number]]        Abnormal            Final result                 Please view results for these tests on the individual orders.   URINALYSIS, REFLEX TO URINE CULTURE   BLOOD SMEAR MICROSCOPIC EXAM (OLG)     EKG Readings: (Independently Interpreted)   Initial Reading: No STEMI. Rhythm: Normal Sinus Rhythm.  Heart Rate: 93. Conduction: 1st Degree AV Block.   ECG Results              EKG 12-lead (In process)  Result time 02/01/23 15:57:19      In process by Interface, Lab In [facility] (02/01/23 15:57:19)                   Narrative:    Test Reason : S06.5XAA,    Vent. Rate : 093 BPM     Atrial Rate : 093 BPM     P-R Int : 268 ms          QRS Dur : 102 ms      QT Int : 366 ms       P-R-T Axes : 087 020 -05 degrees     QTc Int : 455 ms    Sinus rhythm with 1st degree A-V block  Otherwise normal ECG  When compared with ECG of 14-JAN-2023 13:25,  Fusion complexes are no longer Present  Premature ventricular complexes are no longer Present  Premature atrial complexes are no longer Present    Referred By: [email or web address]   SELF           Confirmed By:                                   Imaging Results              CT Head Without Contrast (Final result)  Result time 02/01/23 15:01:45      Final result by Caroline Scales MD (02/01/23 15:01:45)                   Impression:      Small subdural hemorrhages along the right cerebral convexity and falx.    Finding given to Rafael CHURCHILL at the time of dictation.      Electronically signed by: Caroline Scales  Date:    02/01/2023  Time:    15:01               Narrative:    EXAMINATION:  CT HEAD WITHOUT CONTRAST    CLINICAL HISTORY:  Head trauma, minor (Age >= 65y);    TECHNIQUE:  Axial scans were obtained from skull base to the vertex.    Coronal and sagittal reconstructions obtained from the axial data.    Automatic exposure control was utilized to limit radiation dose.    Contrast: None    Radiation Dose:    Total DLP: 876 mGy*cm    COMPARISON:  CT head dated 01/27/2023    FINDINGS:  A subdural hemorrhage along the right cerebral convexity measures up to 14 mm in thickness.  Smaller subdural hemorrhage is seen along the falx.  There is encephalomalacia in the left frontoparietal lobe and cerebellar hemispheres.    There is local mass effect without midline shift or herniation.  The basal  cisterns are patent.  There is diffuse parenchymal volume loss.  The calvarium and skull base are intact.  There is a left posterior scalp hematoma.                                       CT Cervical Spine Without Contrast (Final result)  Result time 02/01/23 15:06:56      Final result by Gorge Donis MD (02/01/23 15:06:56)                   Impression:      1. No acute fracture of the cervical spine.  2. There is unchanged mild superior endplate compression fracture of the T2 vertebral body.      Electronically signed by: Gorge Donis MD  Date:    02/01/2023  Time:    15:06               Narrative:    EXAMINATION:  CT CERVICAL SPINE WITHOUT CONTRAST    CLINICAL HISTORY:  Fall.  Neck trauma.    TECHNIQUE:  Axial CT images of the cervical spine were obtained without intravenous contrast.  Coronal and sagittal reformations were obtained.  Automated exposure control utilized to reduce radiation dose.  Total exam DLP is 876 mGy cm.    COMPARISON:  01/27/2023 CT cervical spine and CT chest    FINDINGS:  Cervical vertebral body heights are preserved.  There is unchanged mild superior endplate compression of the T2 vertebral body.  No retropulsion of fracture fragments.  There is grade 1 anterolisthesis C4 on C5 unchanged.  There is multilevel degenerative spondylosis and facet arthropathy.  The atlantoaxial articulation appears intact.  Prevertebral soft tissues appear within normal limits.  Visualized lung apices demonstrate no acute abnormality.                                       X-Ray Shoulder Complete 2 View Right (Final result)  Result time 02/01/23 15:03:00      Final result by Caroline Scales MD (02/01/23 15:03:00)                   Impression:      No acute bony abnormality.      Electronically signed by: Caroline Scales  Date:    02/01/2023  Time:    15:03               Narrative:    EXAMINATION:  XR SHOULDER COMPLETE 2 OR MORE VIEWS RIGHT    CLINICAL HISTORY:  Pain in unspecified  shoulder    COMPARISON:  X-rays dated 12/08/2022    FINDINGS:  There is no acute fracture or malalignment identified.  The soft tissues are unremarkable.                                       Medications   acetaminophen tablet 650 mg (650 mg Oral Given 2/1/23 1430)   human prothrombin complex (PCC) (KCENTRA) 500 unit (400-620 unit) injection 1,000 Units (1,000 Units Intravenous Given 2/1/23 1657)     Medical Decision Making:   History:   Old Records Summarized: other records.       <> Summary of Records: Patient seen on 01/27/23 for fall and diagnosed with T2 compression fracture. TLSO brace placed.   Initial Assessment:   Patient awake, alert, has non-labored breathing, and follows commands appropriately. C/o headache, neck pain, and R shoulder pain. Reports she fell around 1300 today and hit her head on the coffee table. Denies LOC. Currently on Xarelto and Plavix. Hematoma noted to posterior scalp; scant amount of bleeding noted. Full 5/5 ROM noted to all 4 extremities. GCS 15, answers all questions appropriately.   Differential Diagnosis:   Subdural Hematoma, Hematoma, Fall, Cervical Fracture, Head injury   Clinical Tests:   Lab Tests: Ordered and Reviewed  The following lab test(s) were unremarkable: CBC, CMP, Urinalysis, PTT and PT  Radiological Study: Ordered and Reviewed  Medical Tests: Ordered and Reviewed  ED Management:  Co-morbidities and/or factors adding to the complexity or risk for the patient?: patient's age, blood thinner use, and recurrent falls.   Differential diagnoses: Subdural Hematoma, Hematoma, Fall, Cervical Fracture, Head injury   Decision to obtain previous or outside records?: Patient seen 01/27/23 for fall and diagnosed with T2 fracture.   Chart Review (nursing home, outside records, CareEverywhere): yes  Review of RX medications/new RX prescribed by me?: Currently takes Plavix and Xarelto for a-fib.   My EKG Interpretation: see above  Labs/imaging/other tests obtained/considered  (risk/benefits of testing discussed): cbc, cmp, covid/flu, ekg, ua, pt, inr, ct head, ct cervical spine, and r shoulder xray.   Labs/tests intepretation: Ct head- Small subdural hemorrhages along the right cerebral convexity and falx.. CT cervical spine-   1. No acute fracture of the cervical spine. 2. There is unchanged mild superior endplate compression fracture of the T2 vertebral body. Shoulder xray-   No acute bony abnormality. Informed patient and spouse of findings. No questions.   My independent imaging interpretation: none  Treatment/interventions, IV fluids, IV medications: Tylenol for headache. K-Centra for reversal of anticoagulants.   Complex management (IV controlled substances, went to OR, DNR, meds requiring monitoring, transfer, etc)?: Due to patient's need for trauma and neuro surgery services. Patient transferred to [facility] for further evaluation and treatment.   Workup/treatment affected by social determinants of health?:none   Point of care US done/interpretation: none  Consults/radiologist/EMS/social work/family discussion/alternate history: none  Advanced care planning/end of life discussion: none  Shared decision making: Discussed plan of care, transfer, and intervention wit patient and patient's spouse. Agreed and aware of plan of care. Comfortable being transferred to [facility].   ETOH/smoking/drug cessation discussion: none  Dispo: Patient transferred to [facility] for trauma services.     Other:   I discussed test(s) with the performing physician.       <> Summary of the Findings: Discussed patient and recommendations from other health care providers with Dr. Zheng. Instructed to call pharmacy for accurate dosing for K-Centra. Aware of interventions and transfer to [facility] for further evaluation.   I have discussed this case with another health care provider.           ED Course as of 02/01/23 1723   Wed Feb 01, 2023   1512 Spoke with Dr. Chowdhury regarding patient's neuro status, results, and complaints.  Instructed to admit to ICU with neurosurgery consult, order repeat head CT in the morning, and administer K-Centra for anti-coagulant reversal.  [JA]   1515 This is Dr. Franchesca Zheng and I have seen and examined Ms Haji.  She is an 88-year-old female who fell backwards at home hitting her head on a coffee table.  She complains of pain in the back of her head, right shoulder pain and neck pain.  She is alert and oriented to person place and situation, can state the year, month, etc..  NIH stroke scale is 0.  She was found to have a subdural hematoma with no midline shift and we have spoken to Neurosurgery and are making arrangements ICU admission.  The patient and her  are aware of what is going on and agreeable to transfer to ICU. [SH]   1515 Spoke with Dr. Sanchez, ICU resident, regarding patient's fall, results, complaints, and Dr. Chowdhury's recommendations. No further recommendations at this time. Will admit to ICU services.  [JA]   1535 Spoke with ICU resident, Dr. Sanchez, since patient had ground level fall. Instructed to admit to trauma services.  [JA]   1546 Spoke with Dr. Mederos, with trauma surgery regarding patient. Instructed to transfer patient ED to ED and their services will see patient. No further recommendation given.  [JA]   1550 Spoke with pharmacy regarding K-Centra admin; reports to wait until INR results for accurate dosing for patient.  [JA]   1551 Discussed patient with Dr. Mcdonald at [facility] ER. Aware of plan of care and transfer ER to ER. No questions.  [JA]   1557 C-collar removed. Full 5/5 ROM noted.  [JA]   1620 Spoke with Christie with pharmacy and informed her of patient's INR. Reports appropriate dose is 1,000 units for patient.  [JA]      ED Course User Index  [JA] Jojo Cummings NP  [SH] Franchesca Zheng MD                 Clinical Impression:   Final diagnoses:  [M25.519] Shoulder pain  [S06.5XAA] Subdural hematoma  [I62.00] Subdural hemorrhage  [W19.XXXA] Fall, initial  encounter (Primary)        ED Disposition Condition    Transfer to Another Facility Stable                Jojo Cummings NP  02/01/23 3986

## 2023-02-02 NOTE — NURSING
Nurses Note -- 4 Eyes      2/2/2023   3:00 AM      Skin assessed during: Admit      [] No Pressure Injuries Present    []Prevention Measures Documented      [x] Yes- Altered Skin Integrity Present or Discovered   [] LDA Added if Not in Epic (Describe Wound)   [x] New Altered Skin Integrity was Present on Admit and Documented in LDA   [] Wound Image Taken    Wound Care Consulted? NO    Attending Nurse:  Nicollette Rodney, RN     Second RN/Staff Member:  ZHAO Alonso

## 2023-02-02 NOTE — PLAN OF CARE
Problem: Adult Inpatient Plan of Care  Goal: Plan of Care Review  Outcome: Unable to Meet, Plan Revised  Goal: Patient-Specific Goal (Individualized)  Outcome: Unable to Meet, Plan Revised  Goal: Absence of Hospital-Acquired Illness or Injury  Outcome: Unable to Meet, Plan Revised  Goal: Optimal Comfort and Wellbeing  Outcome: Unable to Meet, Plan Revised  Goal: Readiness for Transition of Care  Outcome: Unable to Meet, Plan Revised     Problem: Fluid and Electrolyte Imbalance (Acute Kidney Injury/Impairment)  Goal: Fluid and Electrolyte Balance  Outcome: Unable to Meet, Plan Revised     Problem: Oral Intake Inadequate (Acute Kidney Injury/Impairment)  Goal: Optimal Nutrition Intake  Outcome: Unable to Meet, Plan Revised     Problem: Renal Function Impairment (Acute Kidney Injury/Impairment)  Goal: Effective Renal Function  Outcome: Unable to Meet, Plan Revised     Problem: Impaired Wound Healing  Goal: Optimal Wound Healing  Outcome: Unable to Meet, Plan Revised     Problem: Skin Injury Risk Increased  Goal: Skin Health and Integrity  Outcome: Unable to Meet, Plan Revised     Problem: Fall Injury Risk  Goal: Absence of Fall and Fall-Related Injury  Outcome: Unable to Meet, Plan Revised

## 2023-02-02 NOTE — PROGRESS NOTES
Chart Review    Course of hospital stay reviewed.  Patient initially presented to [facility] noted to have subdural hemorrhage; subsequently ER to ER transfer obtained, admitted to trauma ICU service, neurosurgery consulted. Unfortunately patient has continued declined with worsening of neuro status and worsening of head CT findings; palliative care was requested by the family.      Case discussed with Trauma ICU attending. Medicine will accept patient as a downgrade to the floor at 2056 with appropriate downgrade orders and will start palliative care. Orders for PRN ativan and morphine placed.      Full Consult note to follow by primary MD in the am.

## 2023-02-02 NOTE — DISCHARGE SUMMARY
[facility] Hospital Medicine Discharge Summary    Admit Date: 2023  Discharge Date and Time: 32:31 PM  Admitting Physician:  Team  Discharging Physician: Tierra Johnson MD.  Primary Care Physician: Tierra Johnson MD  Consults: Trauma    Discharge Diagnoses:  Subdural hematoma with subsequent herniation    Hospital Course:     Patient was admitted on the trauma team and came as a transfer from [facility] ED after being seen there for a fall and hitting the back of her head.  She sustained a subdural hematoma with subsequent herniation.  She was on Plavix since he rolled to due to history of AFib and CAD.  Patient was not a surgical candidate and had sudden change in her neuro status when a repeat CT did show the herniation.  She was then transferred to the Internal Medicine team for palliative care.  She passed at 5:30 a.m. on the morning of 2023.  Was pronounced by Dr. Dickerson.  I was unable to see the patient during this hospitalization.  I did call and speak to Mr. Grace, patient's  who is also my patient provided my sympathy and comfort.  Will continue to keep the family in my prayers.    Physical Exam:      Procedures Performed: No admission procedures for hospital encounter.            Microbiology Results (last 7 days)       ** No results found for the last 168 hours. **             CT Head Without Contrast  Narrative: EXAMINATION:  CT HEAD WITHOUT CONTRAST    CLINICAL HISTORY:  Mental status change, unknown cause;    TECHNIQUE:  Axial scans were obtained from skull base to the vertex.    Coronal and sagittal reconstructions obtained from the axial data.    Automatic exposure control was utilized to limit radiation dose.    Contrast: None    Radiation Dose:    Total DLP: 2016 mGy*cm    COMPARISON:  CT head dated 2023    FINDINGS:  The right-sided subdural hemorrhage has significantly increased in size, now measuring up to 2.5  cm in thickness.  There is small residual subdural hemorrhage along the falx.    There has been significant interval increase and mass effect with sulcal effacement, 1.8 cm of leftward midline shift and right-sided uncal herniation.  There is effacement of the basal cisterns.  The left occipital horn is dilated.  The calvarium and skull base are intact.  Impression: Significant interval increase in size of the right-sided subdural hemorrhage with midline shift and uncal herniation.    Finding given to Moy CHURCHILL at the time of dictation.    Electronically signed by: Caroline Scales  Date:    02/01/2023  Time:    19:20  CT Cervical Spine Without Contrast  Narrative: EXAMINATION:  CT CERVICAL SPINE WITHOUT CONTRAST    CLINICAL HISTORY:  Fall.  Neck trauma.    TECHNIQUE:  Axial CT images of the cervical spine were obtained without intravenous contrast.  Coronal and sagittal reformations were obtained.  Automated exposure control utilized to reduce radiation dose.  Total exam DLP is 876 mGy cm.    COMPARISON:  01/27/2023 CT cervical spine and CT chest    FINDINGS:  Cervical vertebral body heights are preserved.  There is unchanged mild superior endplate compression of the T2 vertebral body.  No retropulsion of fracture fragments.  There is grade 1 anterolisthesis C4 on C5 unchanged.  There is multilevel degenerative spondylosis and facet arthropathy.  The atlantoaxial articulation appears intact.  Prevertebral soft tissues appear within normal limits.  Visualized lung apices demonstrate no acute abnormality.  Impression: 1. No acute fracture of the cervical spine.  2. There is unchanged mild superior endplate compression fracture of the T2 vertebral body.    Electronically signed by: Gorge Donis MD  Date:    02/01/2023  Time:    15:06  X-Ray Shoulder Complete 2 View Right  Narrative: EXAMINATION:  XR SHOULDER COMPLETE 2 OR MORE VIEWS RIGHT    CLINICAL HISTORY:  Pain in unspecified shoulder    COMPARISON:  X-rays dated  2022    FINDINGS:  There is no acute fracture or malalignment identified.  The soft tissues are unremarkable.  Impression: No acute bony abnormality.    Electronically signed by: Caroline Scales  Date:    2023  Time:    15:03  CT Head Without Contrast  Narrative: EXAMINATION:  CT HEAD WITHOUT CONTRAST    CLINICAL HISTORY:  Head trauma, minor (Age >= 65y);    TECHNIQUE:  Axial scans were obtained from skull base to the vertex.    Coronal and sagittal reconstructions obtained from the axial data.    Automatic exposure control was utilized to limit radiation dose.    Contrast: None    Radiation Dose:    Total DLP: 876 mGy*cm    COMPARISON:  CT head dated 2023    FINDINGS:  A subdural hemorrhage along the right cerebral convexity measures up to 14 mm in thickness.  Smaller subdural hemorrhage is seen along the falx.  There is encephalomalacia in the left frontoparietal lobe and cerebellar hemispheres.    There is local mass effect without midline shift or herniation.  The basal cisterns are patent.  There is diffuse parenchymal volume loss.  The calvarium and skull base are intact.  There is a left posterior scalp hematoma.  Impression: Small subdural hemorrhages along the right cerebral convexity and falx.    Finding given to Rafael CHURCHILL at the time of dictation.    Electronically signed by: Caroline Scales  Date:    2023  Time:    15:01             Discharge Disposition:        Tierra Andrew M.D on 2023. at 2:31 PM.

## 2023-02-02 NOTE — PLAN OF CARE
Plan of Care Note:     After discussions with family, given patient's previous DNR/DNI, patient has been transitioned to comfort care. Admission to ICU rescinded. Medicine to admit to palliative care.     Lele Frazier MD  [employer] General Surgery, PGY-2

## 2023-02-02 NOTE — ED PROVIDER NOTES
Encounter Date: 2/1/2023    SCRIBE #1 NOTE: I, Eileen Brantley, am scribing for, and in the presence of,  Manish Mcdonald IV, MD. I have scribed the following portions of the note - Other sections scribed: HPI, ROS, PE.     History     Chief Complaint   Patient presents with    Fall     C/o posterior head pain s/p fall from standing at 1 PM today. Pt has bump to posterior head. Denies LOC, but did get dizzy immediately after hitting head. Denies blurry vision , denies nausea. Takes Plavix     88 year old female w/ hx of Afib on plavix and xarelto, CAD, HTN, and hypothyroidism presents to ED from [facility] for a fall. Per transfer summary, pt had a fall from standing and hit the back of her head, resulting in a subdural hematoma. The case was discussed with neurosurgery and they recommended transfer to here for trauma ICU admission. Pt's only complaint of hand numbness.     Per chart review, pt was seen for a fall on 01/27/23 and subsequent T2 compression fracture, discharged with a brace.    The history is provided by the patient and medical records. A  was used.   Review of patient's allergies indicates:  No Known Allergies  Past Medical History:   Diagnosis Date    A-fib     A-fib 5/3/2022    CAD (coronary artery disease)     CAD (coronary artery disease) 5/3/2022    CVA (cerebrovascular accident)     Dyspnea on exertion     Gastroesophageal reflux disease 5/3/2022    GERD (gastroesophageal reflux disease)     HTN (hypertension)     Hypothyroidism, unspecified     Mixed hyperlipidemia     Renal disorder     S/P AVR (aortic valve replacement)     Synovial cyst of knee     TIA (transient ischemic attack)     TIA (transient ischemic attack) 5/3/2022    Unspecified hemorrhoids     Unspecified osteoarthritis, unspecified site      Past Surgical History:   Procedure Laterality Date    ANGIOGRAM, CORONARY, WITH LEFT HEART CATHETERIZATION      BUNIONECTOMY Right     CATARACT EXTRACTION Right      CHOLECYSTECTOMY      CORONARY STENT PLACEMENT      ESOPHAGOGASTRODUODENOSCOPY N/A 2022    Procedure: EGD W/DIL;  Surgeon: CIARA Nunez MD;  Location: [facility] ENDOSCOPY;  Service: Gastroenterology;  Laterality: N/A;  48FR  Radha    left lower lobectomy Left      Family History   Problem Relation Age of Onset    Heart failure Mother     Cancer Father     Diabetes Sister     Diabetes Brother      Social History     Tobacco Use    Smoking status: Former     Types: Cigarettes     Quit date: 1/3/1972     Years since quittin.1    Smokeless tobacco: Never    Tobacco comments:     quit in    Substance Use Topics    Alcohol use: Not Currently    Drug use: Not Currently     Review of Systems   Constitutional:  Negative for chills and fever.   Respiratory:  Negative for shortness of breath.    Cardiovascular:  Negative for chest pain.   Gastrointestinal:  Negative for abdominal pain, nausea and vomiting.   Skin:  Positive for wound.   Neurological:  Positive for numbness (Hands) and headaches.     Physical Exam     Initial Vitals [23 1409]   BP Pulse Resp Temp SpO2   117/67 91 18 98.6 °F (37 °C) 98 %      MAP       --         Physical Exam    Nursing note and vitals reviewed.  Constitutional: She is not diaphoretic. She appears ill. No distress.   Pt is frail and chronically ill appearing.   HENT:   Posterior L-sided occipital hematoma.   Eyes:   R eye has post-operative changes, 3-2mm and minimally reaction.  L eye is 3-2mm and reactive.   Cardiovascular:  Normal rate and regular rhythm.           No murmur heard.  Pulmonary/Chest: Breath sounds normal. No respiratory distress. She has no wheezes. She has no rales.   Abdominal: Abdomen is soft. She exhibits no distension. There is no abdominal tenderness.     Neurological: She is alert and oriented to person, place, and time. She has normal strength. No cranial nerve deficit or sensory deficit.   Pt is awake, alert, and oriented x4.  Pt is  neurologically intact.   Psychiatric: She has a normal mood and affect.       ED Course   Critical Care    Date/Time: 2/1/2023 11:29 PM  Performed by: Manish Mcdonald IV, MD  Authorized by: Manish Mcdonald IV, MD   Total critical care time (exclusive of procedural time) : 35 minutes  Critical care time was exclusive of separately billable procedures and treating other patients.  Critical care was necessary to treat or prevent imminent or life-threatening deterioration of the following conditions: trauma.  Critical care was time spent personally by me on the following activities: blood draw for specimens, development of treatment plan with patient or surrogate, discussions with consultants, evaluation of patient's response to treatment, examination of patient, obtaining history from patient or surrogate, ordering and performing treatments and interventions, ordering and review of laboratory studies, ordering and review of radiographic studies, pulse oximetry, re-evaluation of patient's condition and review of old charts.      Labs Reviewed   COMPREHENSIVE METABOLIC PANEL - Abnormal; Notable for the following components:       Result Value    Potassium Level 5.2 (*)     Chloride 96 (*)     Glucose Level 144 (*)     Blood Urea Nitrogen 62.3 (*)     Creatinine 3.02 (*)     Calcium Level Total 10.9 (*)     Protein Total 8.2 (*)     Globulin 4.5 (*)     Albumin/Globulin Ratio 0.8 (*)     Aspartate Aminotransferase 71 (*)     All other components within normal limits   APTT - Abnormal; Notable for the following components:    PTT 38.9 (*)     All other components within normal limits   PROTIME-INR - Abnormal; Notable for the following components:    PT 18.6 (*)     INR 1.59 (*)     All other components within normal limits   CBC WITH DIFFERENTIAL - Abnormal; Notable for the following components:    WBC 11.9 (*)     MCHC 30.8 (*)     RDW 25.1 (*)     Platelet 402 (*)     Neut # 9.65 (*)     All other components within normal  limits   BLOOD SMEAR MICROSCOPIC EXAM (OLG) - Abnormal; Notable for the following components:    RBC Morph Abnormal (*)     Anisocyte 3+ (*)     Hypochrom 2+ (*)     Poik 1+ (*)     Platelet Est Increased (*)     All other components within normal limits   POCT GLUCOSE - Abnormal; Notable for the following components:    POCT Glucose 143 (*)     All other components within normal limits   COVID/FLU A&B PCR - Normal    Narrative:     The Xpert Xpress SARS-CoV-2/FLU/RSV plus is a rapid, multiplexed real-time PCR test intended for the simultaneous qualitative detection and differentiation of SARS-CoV-2, Influenza A, Influenza B, and respiratory syncytial virus (RSV) viral RNA in either nasopharyngeal swab or nasal swab specimens.         CBC W/ AUTO DIFFERENTIAL    Narrative:     The following orders were created for panel order CBC auto differential.  Procedure                               Abnormality         Status                     ---------                               -----------         ------                     CBC with Differential[[id number]]        Abnormal            Final result                 Please view results for these tests on the individual orders.   URINALYSIS, REFLEX TO URINE CULTURE        ECG Results              EKG 12-lead (In process)  Result time 02/01/23 15:57:19      In process by Interface, Lab In [facility] (02/01/23 15:57:19)                   Narrative:    Test Reason : S06.5XAA,    Vent. Rate : 093 BPM     Atrial Rate : 093 BPM     P-R Int : 268 ms          QRS Dur : 102 ms      QT Int : 366 ms       P-R-T Axes : 087 020 -05 degrees     QTc Int : 455 ms    Sinus rhythm with 1st degree A-V block  Otherwise normal ECG  When compared with ECG of 14-JAN-2023 13:25,  Fusion complexes are no longer Present  Premature ventricular complexes are no longer Present  Premature atrial complexes are no longer Present    Referred By: [email or web address]   SELF           Confirmed By:                                    Imaging Results              CT Head Without Contrast (Final result)  Result time 02/01/23 19:20:40      Final result by Caroline Scales MD (02/01/23 19:20:40)                   Impression:      Significant interval increase in size of the right-sided subdural hemorrhage with midline shift and uncal herniation.    Finding given to Moy CHURCHILL at the time of dictation.      Electronically signed by: Caroline Scales  Date:    02/01/2023  Time:    19:20               Narrative:    EXAMINATION:  CT HEAD WITHOUT CONTRAST    CLINICAL HISTORY:  Mental status change, unknown cause;    TECHNIQUE:  Axial scans were obtained from skull base to the vertex.    Coronal and sagittal reconstructions obtained from the axial data.    Automatic exposure control was utilized to limit radiation dose.    Contrast: None    Radiation Dose:    Total DLP: 2016 mGy*cm    COMPARISON:  CT head dated 02/01/2023    FINDINGS:  The right-sided subdural hemorrhage has significantly increased in size, now measuring up to 2.5 cm in thickness.  There is small residual subdural hemorrhage along the falx.    There has been significant interval increase and mass effect with sulcal effacement, 1.8 cm of leftward midline shift and right-sided uncal herniation.  There is effacement of the basal cisterns.  The left occipital horn is dilated.  The calvarium and skull base are intact.                                       CT Head Without Contrast (Final result)  Result time 02/01/23 15:01:45      Final result by Caroline Scales MD (02/01/23 15:01:45)                   Impression:      Small subdural hemorrhages along the right cerebral convexity and falx.    Finding given to Rafael CHURCHILL at the time of dictation.      Electronically signed by: Caroline Scales  Date:    02/01/2023  Time:    15:01               Narrative:    EXAMINATION:  CT HEAD WITHOUT CONTRAST    CLINICAL HISTORY:  Head trauma, minor (Age >= 65y);    TECHNIQUE:  Axial scans were  obtained from skull base to the vertex.    Coronal and sagittal reconstructions obtained from the axial data.    Automatic exposure control was utilized to limit radiation dose.    Contrast: None    Radiation Dose:    Total DLP: 876 mGy*cm    COMPARISON:  CT head dated 01/27/2023    FINDINGS:  A subdural hemorrhage along the right cerebral convexity measures up to 14 mm in thickness.  Smaller subdural hemorrhage is seen along the falx.  There is encephalomalacia in the left frontoparietal lobe and cerebellar hemispheres.    There is local mass effect without midline shift or herniation.  The basal cisterns are patent.  There is diffuse parenchymal volume loss.  The calvarium and skull base are intact.  There is a left posterior scalp hematoma.                                       CT Cervical Spine Without Contrast (Final result)  Result time 02/01/23 15:06:56      Final result by Gorge Donis MD (02/01/23 15:06:56)                   Impression:      1. No acute fracture of the cervical spine.  2. There is unchanged mild superior endplate compression fracture of the T2 vertebral body.      Electronically signed by: Gorge Donis MD  Date:    02/01/2023  Time:    15:06               Narrative:    EXAMINATION:  CT CERVICAL SPINE WITHOUT CONTRAST    CLINICAL HISTORY:  Fall.  Neck trauma.    TECHNIQUE:  Axial CT images of the cervical spine were obtained without intravenous contrast.  Coronal and sagittal reformations were obtained.  Automated exposure control utilized to reduce radiation dose.  Total exam DLP is 876 mGy cm.    COMPARISON:  01/27/2023 CT cervical spine and CT chest    FINDINGS:  Cervical vertebral body heights are preserved.  There is unchanged mild superior endplate compression of the T2 vertebral body.  No retropulsion of fracture fragments.  There is grade 1 anterolisthesis C4 on C5 unchanged.  There is multilevel degenerative spondylosis and facet arthropathy.  The atlantoaxial articulation  appears intact.  Prevertebral soft tissues appear within normal limits.  Visualized lung apices demonstrate no acute abnormality.                                       X-Ray Shoulder Complete 2 View Right (Final result)  Result time 02/01/23 15:03:00      Final result by Caroline Scales MD (02/01/23 15:03:00)                   Impression:      No acute bony abnormality.      Electronically signed by: Caroline Scales  Date:    02/01/2023  Time:    15:03               Narrative:    EXAMINATION:  XR SHOULDER COMPLETE 2 OR MORE VIEWS RIGHT    CLINICAL HISTORY:  Pain in unspecified shoulder    COMPARISON:  X-rays dated 12/08/2022    FINDINGS:  There is no acute fracture or malalignment identified.  The soft tissues are unremarkable.                                    X-Rays:   Independently Interpreted Readings:   Head CT: 14 mm SDH on CT head, worsening to 25 mm with midline shift on repeat CT head    Medications   sodium chloride 0.9% flush 10 mL (has no administration in time range)   0.9 % NaCl with KCl 20 mEq infusion ( Intravenous New Bag 2/1/23 2010)   ondansetron injection 8 mg (has no administration in time range)   sodium chloride 0.9% flush 10 mL (has no administration in time range)   LORazepam injection 2 mg (has no administration in time range)   morphine injection 4 mg (has no administration in time range)   acetaminophen tablet 650 mg (650 mg Oral Given 2/1/23 1430)   human prothrombin complex (PCC) (KCENTRA) 500 unit (400-620 unit) injection 1,000 Units (1,000 Units Intravenous Given 2/1/23 1657)   lactated ringers bolus 1,000 mL (0 mLs Intravenous Stopped 2/1/23 1900)   LORazepam injection 2 mg (2 mg Intravenous Given 2/1/23 1839)   LORazepam injection 1 mg ( Intravenous Canceled Entry 2/1/23 1900)   levETIRAcetam in NaCl (iso-os) IVPB 1,000 mg ( Intravenous Canceled Entry 2/1/23 1930)      Medical Decision Making    ED assessment:  88-year-old female history of AFib on anticoagulation transfer  from [facility] for subdural hematoma.  Patient presented there after a ground level fall noted to have 14 mm subdural hematoma on her CT with no midline shift GCS 15 neurologically intact prior to transfer .  On arrival here patient was GCS 15 neuro intact however after short time in ED patient began having seizure activity was given Ativan Keppra resolution of seizure activity however mental status continued to decline.  Repeat head CT showed significant worsening of bleed with uncal herniation.  With discussions with  patient had already been made DNR DNI couple of months ago on admission  reported to me that per her wishes he would not like any intubation or further procedures.  Discussed with neurosurgery who due to patient's DNR status recommended comfort care only and no interventions.  Patient initially admitted to trauma ICU but was transferred to floor for palliative care    Differential diagnosis:   ICH, concussion, contusion, c spine injury    ED management: IV keppra, IV ativan, K-centra     My independent radiology interpretation: 14 mm SDH on CT head, worsening to 25 mm with midline shift on repeat CT head       Amount and/or Complexity of Data Reviewed  Independent historian: none   Summary of history:   External data reviewed: notes from previous ED visits and transfer records  Summary of data reviewed: Per transfer summary, pt had a fall from standing and hit the back of her head, resulting in a subdural hematoma. The case was discussed with neurosurgery and they recommended transfer to here for trauma ICU admission. Per chart review, pt was seen for a fall on 01/27/23 and subsequent T2 compression fracture, discharged with a brace.  Risk and benefits of testing: discussed   Labs: ordered and reviewed  Radiology: ordered and independent interpretation performed (see above or ED course)  ECG/medicine tests: ordered and independent interpretation performed (see above or ED  course)  Discussion of management or test interpretation with external provider(s): discussed with trauma surgery consultant and transferring physician   Summary of discussion: admit to floor for palliative care, see above for transfer record summary      Risk  Decision regarding hospitalization  Decision not to resuscitate or to de-escalate care due to poor prognosis     Critical Care  30-74 minutes     Manish BROWN MD personally performed the history, PE, MDM, and procedures as documented above and agree with the scribe's documentation.          Scribe Attestation:   Scribe #1: I performed the above scribed service and the documentation accurately describes the services I performed. I attest to the accuracy of the note.    Attending Attestation:           Physician Attestation for Scribe:  Physician Attestation Statement for Scribe #1: Manish BROWN IV, MD, reviewed documentation, as scribed by Eileen Brantley in my presence, and it is both accurate and complete.           ED Course as of 02/01/23 2330   Wed Feb 01, 2023   8562 Spoke with Dr. Chowdhury regarding patient's neuro status, results, and complaints. Instructed to admit to ICU with neurosurgery consult, order repeat head CT in the morning, and administer K-Centra for anti-coagulant reversal.  [JA]   4279 This is Dr. Franchesca Zheng and I have seen and examined Ms Haji.  She is an 88-year-old female who fell backwards at home hitting her head on a coffee table.  She complains of pain in the back of her head, right shoulder pain and neck pain.  She is alert and oriented to person place and situation, can state the year, month, etc..  NIH stroke scale is 0.  She was found to have a subdural hematoma with no midline shift and we have spoken to Neurosurgery and are making arrangements ICU admission.  The patient and her  are aware of what is going on and agreeable to transfer to ICU. [SH]   9731 Spoke with Dr. Sanchez, ICU resident, regarding  patient's fall, results, complaints, and Dr. Chowdhury's recommendations. No further recommendations at this time. Will admit to ICU services.  [JA]   1535 Spoke with ICU resident, Dr. Sanchez, since patient had ground level fall. Instructed to admit to trauma services.  [JA]   1546 Spoke with Dr. Mederos, with trauma surgery regarding patient. Instructed to transfer patient ED to ED and their services will see patient. No further recommendation given.  [JA]   1550 Spoke with pharmacy regarding K-Centra admin; reports to wait until INR results for accurate dosing for patient.  [JA]   1551 Discussed patient with Dr. Mcdonald at [facility] ER. Aware of plan of care and transfer ER to ER. No questions.  [JA]   1557 C-collar removed. Full 5/5 ROM noted.  [JA]   1620 Spoke with Christie with pharmacy and informed her of patient's INR. Reports appropriate dose is 1,000 units for patient.  [JA]   1822 Spoke with trauma surgery resident, they will come evaluate  [AC]   1850 Patient noted by nursing and trauma resident to have some episodes of unresponsiveness, not answering questions appropriately monitoring nonsensically staring off into space some mild tremor like shaking movements of her extremities.  I confirmed this on my exam I am concerned she is having seizure-like activity we will give Ativan Keppra and continue to reassess she is satting well and breathing normally at this time protecting her airway [AC]   1903 Patient seizure activity seems to have ceased with meds she is somnolent likely slightly over sedated from seizure medicines at this time she is still breathing normal rate slightly hypoxic on a couple of L of nasal cannula.  I discussed advanced care planning with the  he does report to me that she is a DNR he states that she would not want a breathing tube or CPR if things were to worsen clinically we will attempt to find the official documentation to confirm.   [AC]   1922 Repeat head CT with significant  progression of subdural now about 26 mm with midline shift neurosurgery consulted Trauma consulted I confirmed with chart review and patient's  that she is DNR DNI we will provide maximum amount of supportive care at this time [AC]   1940 Spoke with Dr. Chowdhury with neurosurgery -he recommends no further interventions given DNR/DNI, comfort care at this time.  [AC]   1942 Discussed progression of head CT and herniation with , he voices understanding with severity of patients condition.  [AC]   2039 Paged Dr. Johnson. [AA]      ED Course User Index  [AA] Fercho Arellano  [AC] Manish Mcdonald IV, MD  [JA] Jojo Cummings NP  [SH] Franchesca Zheng MD                 Clinical Impression:   Final diagnoses:  [M25.519] Shoulder pain  [S06.5XAA] Subdural hematoma (Primary)  [I62.00] Subdural hemorrhage  [W19.XXXA] Fall, initial encounter        ED Disposition Condition    Admit Critical                Manish Mcdonald IV, MD  02/01/23 0251

## 2023-02-02 NOTE — H&P
Trauma ICU   History and Physical Note    Patient Name: Dee Haji  YOB: 1934  Date: 02/01/2023 7:16 PM  Date of Admission: 2/1/2023  HD#0  POD#* No surgery found *    PRESENTING HISTORY   Chief Complaint/Reason for Admission: Subdural hemorrhage    History of Present Illness:  Ms. Dee Haji is an 88-year-old female with past medical history of AFib on Xarelto, AVR on Plavix, CAD, CVA, GERD, hypothyroidism, hypertension, CKD who presents to the ED as transfer from [facility]o for ICU admission following ground level fall on aspirin and Plavix resulting in subdural hemorrhage.  She had a recent fall on 01/27/2023 and subsequent T2 compression fracture; was subsequently discharged with TLSO brace.  On presentation, she was a GCS of 15.  Neurosurgery had been consulted and recommended ICU admission with repeat CT head in the a.m. During my assessment in the ED, the patient began having an acute mental status change that was inconsistent with her prior examinations.  She was unable to control her motor function and was unable to answer my questions.  She also began having tonic-clonic movements.  There was concern for possible progression of her subdural.  A stat CT head was placed and confirmed much worsening of her head bleed.  Ativan was given for seizures as well as Keppra.  Neurosurgery notified immediately.    Review of Systems:  12 point ROS negative except as stated in HPI    PAST HISTORY:   Past medical history:  Past Medical History:   Diagnosis Date    A-fib     A-fib 5/3/2022    CAD (coronary artery disease)     CAD (coronary artery disease) 5/3/2022    CVA (cerebrovascular accident)     Dyspnea on exertion     Gastroesophageal reflux disease 5/3/2022    GERD (gastroesophageal reflux disease)     HTN (hypertension)     Hypothyroidism, unspecified     Mixed hyperlipidemia     Renal disorder     S/P AVR (aortic valve replacement)     Synovial cyst of knee     TIA (transient ischemic  attack)     TIA (transient ischemic attack) 5/3/2022    Unspecified hemorrhoids     Unspecified osteoarthritis, unspecified site      Past Medical History:   Diagnosis Date    A-fib     A-fib 5/3/2022    CAD (coronary artery disease)     CAD (coronary artery disease) 5/3/2022    CVA (cerebrovascular accident)     Dyspnea on exertion     Gastroesophageal reflux disease 5/3/2022    GERD (gastroesophageal reflux disease)     HTN (hypertension)     Hypothyroidism, unspecified     Mixed hyperlipidemia     Renal disorder     S/P AVR (aortic valve replacement)     Synovial cyst of knee     TIA (transient ischemic attack)     TIA (transient ischemic attack) 5/3/2022    Unspecified hemorrhoids     Unspecified osteoarthritis, unspecified site        Past surgical history:  Past Surgical History:   Procedure Laterality Date    ANGIOGRAM, CORONARY, WITH LEFT HEART CATHETERIZATION      BUNIONECTOMY Right     CATARACT EXTRACTION Right     CHOLECYSTECTOMY      CORONARY STENT PLACEMENT      ESOPHAGOGASTRODUODENOSCOPY N/A 11/17/2022    Procedure: EGD W/DIL;  Surgeon: CIARA Nunez MD;  Location: [facility] ENDOSCOPY;  Service: Gastroenterology;  Laterality: N/A;  48FR  Garcia    left lower lobectomy Left      Past Surgical History:   Procedure Laterality Date    ANGIOGRAM, CORONARY, WITH LEFT HEART CATHETERIZATION      BUNIONECTOMY Right     CATARACT EXTRACTION Right     CHOLECYSTECTOMY      CORONARY STENT PLACEMENT      ESOPHAGOGASTRODUODENOSCOPY N/A 11/17/2022    Procedure: EGD W/DIL;  Surgeon: CIARA Nunez MD;  Location: [facility] ENDOSCOPY;  Service: Gastroenterology;  Laterality: N/A;  48FR  Garcia    left lower lobectomy Left        Family history:  Family History   Problem Relation Age of Onset    Heart failure Mother     Cancer Father     Diabetes Sister     Diabetes Brother        Social history:  Social History     Socioeconomic History    Marital status:    Tobacco Use    Smoking status: Former      Types: Cigarettes     Quit date: 1/3/1972     Years since quittin.1    Smokeless tobacco: Never    Tobacco comments:     quit in    Substance and Sexual Activity    Alcohol use: Not Currently    Drug use: Not Currently     Social History     Tobacco Use   Smoking Status Former    Types: Cigarettes    Quit date: 1/3/1972    Years since quittin.1   Smokeless Tobacco Never   Tobacco Comments    quit in       Social History     Substance and Sexual Activity   Alcohol Use Not Currently        MEDICATIONS & ALLERGIES:   Allergies: Review of patient's allergies indicates:  No Known Allergies  Home Meds:   Current Outpatient Medications   Medication Instructions    acetaminophen-codeine 300-30mg (TYLENOL #3) 300-30 mg Tab 1 tablet, Oral, Every 6 hours PRN    cholecalciferol, vitamin D3, 125 mcg (5,000 unit) capsule 5,000 Int'l Units, Oral, Daily    clopidogreL (PLAVIX) 75 mg, Oral, Daily    docusate sodium (COLACE) 100 mg, Oral, 2 times daily, prn    EScitalopram oxalate (LEXAPRO) 10 mg, Oral, Daily    fenofibrate micronized (LOFIBRA) 134 mg, Oral, Daily    folic acid/multivit-min/lutein (CENTRUM SILVER ORAL) 1 tablet, Oral, Daily    hydrOXYzine HCL (ATARAX) 25 mg, Oral, Nightly    levothyroxine (SYNTHROID) 50 mcg, Oral, Before breakfast    nitroGLYCERIN (NITROSTAT) 0.4 mg, Sublingual, Every 5 min PRN, X 3 doses    ondansetron (ZOFRAN) 4 mg, Oral, Every 6 hours PRN    pantoprazole (PROTONIX) 40 mg, Oral, 2 times daily    potassium chloride SA (K-DUR,KLOR-CON) 20 MEQ tablet TAKE 1 TABLET BY MOUTH EVERY DAY    ranolazine (RANEXA) 500 mg, Oral, 2 times daily    rosuvastatin (CRESTOR) 40 mg, Oral, Daily    sucralfate (CARAFATE) 1 g, Oral, 3 times daily    torsemide (DEMADEX) 20 mg, Oral, Daily    vit A/vit C/vit E/zinc/copper (PRESERVISION AREDS ORAL) 1 capsule, Oral, Daily    XARELTO 2.5 mg, Oral, 2 times daily      No current facility-administered medications on file prior to encounter.     Current  Outpatient Medications on File Prior to Encounter   Medication Sig Dispense Refill    acetaminophen-codeine 300-30mg (TYLENOL #3) 300-30 mg Tab Take 1 tablet by mouth every 6 (six) hours as needed (PAIN). 20 tablet 0    cholecalciferol, vitamin D3, 125 mcg (5,000 unit) capsule Take 5,000 Int'l Units by mouth once daily.      clopidogreL (PLAVIX) 75 mg tablet Take 75 mg by mouth once daily.      docusate sodium (COLACE) 100 MG capsule Take 100 mg by mouth 2 (two) times daily. prn      EScitalopram oxalate (LEXAPRO) 10 MG tablet Take 1 tablet (10 mg total) by mouth once daily. 30 tablet 6    fenofibrate micronized (LOFIBRA) 134 MG Cap Take 134 mg by mouth once daily at 6am.      folic acid/multivit-min/lutein (CENTRUM SILVER ORAL) Take 1 tablet by mouth once daily.      hydrOXYzine HCL (ATARAX) 25 MG tablet Take 1 tablet (25 mg total) by mouth every evening. 30 tablet 5    levothyroxine (SYNTHROID) 50 MCG tablet Take 1 tablet (50 mcg total) by mouth before breakfast. 30 tablet 11    nitroGLYCERIN (NITROSTAT) 0.4 MG SL tablet Place 0.4 mg under the tongue every 5 (five) minutes as needed. X 3 doses      ondansetron (ZOFRAN) 4 MG tablet Take 1 tablet (4 mg total) by mouth every 6 (six) hours as needed for Nausea. 24 tablet 0    pantoprazole (PROTONIX) 40 MG tablet Take 40 mg by mouth 2 (two) times daily.      potassium chloride SA (K-DUR,KLOR-CON) 20 MEQ tablet TAKE 1 TABLET BY MOUTH EVERY DAY 90 tablet 0    ranolazine (RANEXA) 500 MG Tb12 Take 500 mg by mouth 2 (two) times daily.      rivaroxaban (XARELTO) 2.5 mg Tab Take 2.5 mg by mouth 2 (two) times daily.      rosuvastatin (CRESTOR) 40 MG Tab Take 40 mg by mouth once daily.      sucralfate (CARAFATE) 1 gram tablet Take 1 g by mouth 3 (three) times daily.      torsemide (DEMADEX) 20 MG Tab Take 1 tablet (20 mg total) by mouth once daily. 30 tablet 11    vit A/vit C/vit E/zinc/copper (PRESERVISION AREDS ORAL) Take 1 capsule by mouth once daily.      [DISCONTINUED]  hydroCHLOROthiazide (HYDRODIURIL) 25 MG tablet       [DISCONTINUED] olmesartan (BENICAR) 20 MG tablet         No current facility-administered medications on file prior to encounter.     Current Outpatient Medications on File Prior to Encounter   Medication Sig    acetaminophen-codeine 300-30mg (TYLENOL #3) 300-30 mg Tab Take 1 tablet by mouth every 6 (six) hours as needed (PAIN).    cholecalciferol, vitamin D3, 125 mcg (5,000 unit) capsule Take 5,000 Int'l Units by mouth once daily.    clopidogreL (PLAVIX) 75 mg tablet Take 75 mg by mouth once daily.    docusate sodium (COLACE) 100 MG capsule Take 100 mg by mouth 2 (two) times daily. prn    EScitalopram oxalate (LEXAPRO) 10 MG tablet Take 1 tablet (10 mg total) by mouth once daily.    fenofibrate micronized (LOFIBRA) 134 MG Cap Take 134 mg by mouth once daily at 6am.    folic acid/multivit-min/lutein (CENTRUM SILVER ORAL) Take 1 tablet by mouth once daily.    hydrOXYzine HCL (ATARAX) 25 MG tablet Take 1 tablet (25 mg total) by mouth every evening.    levothyroxine (SYNTHROID) 50 MCG tablet Take 1 tablet (50 mcg total) by mouth before breakfast.    nitroGLYCERIN (NITROSTAT) 0.4 MG SL tablet Place 0.4 mg under the tongue every 5 (five) minutes as needed. X 3 doses    ondansetron (ZOFRAN) 4 MG tablet Take 1 tablet (4 mg total) by mouth every 6 (six) hours as needed for Nausea.    pantoprazole (PROTONIX) 40 MG tablet Take 40 mg by mouth 2 (two) times daily.    potassium chloride SA (K-DUR,KLOR-CON) 20 MEQ tablet TAKE 1 TABLET BY MOUTH EVERY DAY    ranolazine (RANEXA) 500 MG Tb12 Take 500 mg by mouth 2 (two) times daily.    rivaroxaban (XARELTO) 2.5 mg Tab Take 2.5 mg by mouth 2 (two) times daily.    rosuvastatin (CRESTOR) 40 MG Tab Take 40 mg by mouth once daily.    sucralfate (CARAFATE) 1 gram tablet Take 1 g by mouth 3 (three) times daily.    torsemide (DEMADEX) 20 MG Tab Take 1 tablet (20 mg total) by mouth once daily.    vit A/vit C/vit E/zinc/copper (PRESERVISION  AREDS ORAL) Take 1 capsule by mouth once daily.    [DISCONTINUED] hydroCHLOROthiazide (HYDRODIURIL) 25 MG tablet     [DISCONTINUED] olmesartan (BENICAR) 20 MG tablet      Scheduled Meds:   [START ON 2/2/2023] EScitalopram oxalate  10 mg Oral Daily    lactated ringers  1,000 mL Intravenous ED 1 Time    levETIRAcetam in NaCl (iso-os)        levETIRAcetam in NaCl (iso-os)        [START ON 2/2/2023] levothyroxine  50 mcg Oral Before breakfast    LORazepam        LORazepam        [START ON 2/2/2023] pantoprazole  40 mg Intravenous Daily     Continuous Infusions:   0/9% NACL & POTASSIUM CHLORIDE 20 MEQ/L       PRN Meds:ondansetron, ondansetron, sodium chloride 0.9%    OBJECTIVE:   Vital Signs:  VITAL SIGNS: 24 HR MIN & MAX LAST   Temp  Min: 97 °F (36.1 °C)  Max: 98.6 °F (37 °C)  97 °F (36.1 °C)   BP  Min: 117/67  Max: 135/78  135/78    Pulse  Min: 85  Max: 91  89    Resp  Min: 17  Max: 19  17    SpO2  Min: 98 %  Max: 99 %  98 %      HT: 5' (152.4 cm)  WT: 40.4 kg (89 lb)  BMI: 17.4     Lines/drains/airway:       Peripheral IV - Single Lumen 02/01/23 1554 20 G Left Antecubital (Active)   Site Assessment No swelling;No redness 02/01/23 1554   Extremity Assessment Distal to IV Dry;Warm 02/01/23 1554   Line Status Blood return noted;Flushed 02/01/23 1554   Dressing Status Clean;Dry;Intact 02/01/23 1554   Dressing Intervention First dressing 02/01/23 1554   Number of days: 0       Physical Exam:  General:  thin, elderly, general uncontrolled movements  HEENT:  Normocephalic, atraumatic  CV:  RR  Resp: NWOB  GI:  Abdomen soft, non-tender, non-distended  :  Deferred  MSK:  No muscle atrophy, cyanosis, peripheral edema, moving all extremities spontaneously  Skin/Wounds:  No rashes, ulcers, erythema  Neuro:  E4V4M5 GCS 13. CNII-XII grossly intact. Pupils equal and reactive JERRY. Not tracking. Waxing and waning. Able to correctly answer place and self. Unable to answer situation or time. Tonic-clonic movements in BUE/BLE.      Labs:  Troponin:  No results for input(s): TROPONINI in the last 72 hours.  CBC:  Recent Labs     02/01/23  1544   WBC 11.9*   RBC 4.70   HGB 12.1   HCT 39.3   *   MCV 83.6   MCH 25.7   MCHC 30.8*     CMP:  Recent Labs     02/01/23  1544   CALCIUM 10.9*   ALBUMIN 3.7      K 5.2*   CO2 27   BUN 62.3*   CREATININE 3.02*   ALKPHOS 110   ALT 20   AST 71*   BILITOT 1.2     Lactic Acid:  No results for input(s): LACTATE in the last 72 hours.  ETOH:  No results for input(s): ETHANOL in the last 72 hours.   Urine Drug Screen:  No results for input(s): COCAINE, OPIATE, BARBITURATE, AMPHETAMINE, FENTANYL, CANNABINOIDS, MDMA in the last 72 hours.    Invalid input(s): BENZODIAZEPINE, PHENCYCLIDINE   ABG:  No results for input(s): PH, PCO2, PO2, HCO3, BE, POCSATURATED in the last 72 hours.    Diagnostic Results:  CT Head Without Contrast   Final Result      Small subdural hemorrhages along the right cerebral convexity and falx.      Finding given to Rafael CHURCHILL at the time of dictation.         Electronically signed by: Caroline Scales   Date:    02/01/2023   Time:    15:01      CT Cervical Spine Without Contrast   Final Result      1. No acute fracture of the cervical spine.   2. There is unchanged mild superior endplate compression fracture of the T2 vertebral body.         Electronically signed by: Gorge Donis MD   Date:    02/01/2023   Time:    15:06      X-Ray Shoulder Complete 2 View Right   Final Result      No acute bony abnormality.         Electronically signed by: Caroline Scales   Date:    02/01/2023   Time:    15:03      CT Head Without Contrast    (Results Pending)       ASSESSMENT & PLAN:    88-year-old female with past medical history of AFib on Xarelto, AVR on Plavix, CAD, CVA, GERD, hypothyroidism, hypertension, CKD who presents to the ED as transfer from [facility] ortho for ICU admission following ground level fall on aspirin and Plavix resulting in subdural hemorrhage with uncal herniation and  midline shift.     Consults:  Neurosurgery     Neuro:  - GCS 13(E 4, V 4, M 5)   - Multimodal pain control   - C-Collar no     Pulmonary:  - IS, pulmonary toilet     Cardiovascular:  - Cardiac monitoring while in the ICU     Renal:  - Strict I&Os     FEN/GI:  - IVF: Normal Saline @ 100 cc/hr  - Diet: NPO  - Daily CMP  - Replace electrolytes as needed based on daily labs     Heme/ID:  - Daily CBC  - Antibiotics: Antibiotics not indicated at this time.    Endocrine:  - BG <180     Musculoskeletal:  - PT/OT when able to participate  - WB status:   RUE: as tolerated  LUE: as tolerated  RLE: as tolerated  LLE: as tolerated  - Tertiary when appropriate      Wounds:  - Local wound care     Precautions:  Aspiration , Fall, Seizure, and Delirium    Prophylaxis:  GI: Not indicated. On PPI at home.  Seizure: Keppra (day 1/7)  DVT: SCDs  Defer chemoprophylaxis to Neurosurgery      LDA:  Peripheral IV, (Date placed 2/1/23)    Disposition:  Admit to trauma ICU for SDH.     Lele Frazier MD  [employer] General Surgery, PGY-2    2/1/2023 7:16 PM    The above findings, diagnostics, and treatment plan were discussed with the physician who will follow with further assessments and recommendations.

## 2023-02-02 NOTE — ED PROVIDER NOTES
I was called by Dr. Almanzar (on call for Dr Johnson) and asked to pronounce miss Haji.   Patient was admitted with comfort measures after she had a fall hit the back of her head sustained a subdural hematoma with subsequent herniation.  She is on Plavix and Xarelto due to a history of AFib and CAD.   RN reports pt stopped breathing and had no pulse at 05:30.  was present.  On my exam patient has no spontaneous respirations.  No heartbeat.  No palpable pulse.  She is .  Time of death 5:30 a.m..  Please discontinue lines in orders.  Death certificate can be sent to her primary physician Dr Johnson.        Filemon Dickerson MD  [phone]

## 2023-02-04 ENCOUNTER — DOCUMENT SCAN (OUTPATIENT)
Dept: HOME HEALTH SERVICES | Facility: HOSPITAL | Age: 88
End: 2023-02-04
Payer: MEDICARE

## 2023-02-04 PROCEDURE — 99499 NO LOS: ICD-10-PCS | Mod: ,,, | Performed by: INTERNAL MEDICINE

## 2023-02-04 PROCEDURE — 99499 UNLISTED E&M SERVICE: CPT | Mod: ,,, | Performed by: INTERNAL MEDICINE

## 2023-02-08 ENCOUNTER — OUTPATIENT CASE MANAGEMENT (OUTPATIENT)
Dept: ADMINISTRATIVE | Facility: OTHER | Age: 88
End: 2023-02-08
Payer: MEDICARE

## 2023-02-27 ENCOUNTER — DOCUMENT SCAN (OUTPATIENT)
Dept: HOME HEALTH SERVICES | Facility: HOSPITAL | Age: 88
End: 2023-02-27
Payer: MEDICARE

## 2023-02-28 ENCOUNTER — DOCUMENT SCAN (OUTPATIENT)
Dept: HOME HEALTH SERVICES | Facility: HOSPITAL | Age: 88
End: 2023-02-28
Payer: MEDICARE

## 2023-03-01 ENCOUNTER — DOCUMENT SCAN (OUTPATIENT)
Dept: HOME HEALTH SERVICES | Facility: HOSPITAL | Age: 88
End: 2023-03-01
Payer: MEDICARE

## 2023-03-02 ENCOUNTER — DOCUMENT SCAN (OUTPATIENT)
Dept: HOME HEALTH SERVICES | Facility: HOSPITAL | Age: 88
End: 2023-03-02
Payer: MEDICARE
